# Patient Record
Sex: FEMALE | Employment: OTHER | URBAN - METROPOLITAN AREA
[De-identification: names, ages, dates, MRNs, and addresses within clinical notes are randomized per-mention and may not be internally consistent; named-entity substitution may affect disease eponyms.]

---

## 2020-06-10 ENCOUNTER — TELEPHONE ENCOUNTER (OUTPATIENT)
Dept: URBAN - METROPOLITAN AREA CLINIC 9 | Facility: CLINIC | Age: 70
End: 2020-06-10

## 2022-07-30 ENCOUNTER — TELEPHONE ENCOUNTER (OUTPATIENT)
Age: 72
End: 2022-07-30

## 2022-07-31 ENCOUNTER — TELEPHONE ENCOUNTER (OUTPATIENT)
Age: 72
End: 2022-07-31

## 2023-05-11 PROBLEM — K21.9 GERD (GASTROESOPHAGEAL REFLUX DISEASE): Status: ACTIVE | Noted: 2023-05-11

## 2023-05-11 PROBLEM — F01.50 VASCULAR DEMENTIA WITHOUT BEHAVIORAL DISTURBANCE (MULTI): Status: ACTIVE | Noted: 2023-05-11

## 2023-05-11 PROBLEM — F32.9 MDD (MAJOR DEPRESSIVE DISORDER): Status: ACTIVE | Noted: 2023-05-11

## 2023-05-11 PROBLEM — I82.509 CHRONIC VENOUS EMBOLISM AND THROMBOSIS OF DEEP VESSELS OF LOWER EXTREMITY (MULTI): Status: ACTIVE | Noted: 2023-05-11

## 2023-05-11 PROBLEM — I10 BENIGN ESSENTIAL HTN: Status: ACTIVE | Noted: 2023-05-11

## 2023-05-11 RX ORDER — AMLODIPINE BESYLATE 5 MG/1
5 TABLET ORAL DAILY
COMMUNITY

## 2023-05-11 RX ORDER — OLANZAPINE 10 MG/1
10 TABLET ORAL NIGHTLY
COMMUNITY

## 2023-05-11 RX ORDER — DONEPEZIL HYDROCHLORIDE 5 MG/1
5 TABLET, FILM COATED ORAL NIGHTLY
COMMUNITY

## 2023-05-11 RX ORDER — FLUOXETINE HYDROCHLORIDE 20 MG/1
20 CAPSULE ORAL EVERY MORNING
COMMUNITY

## 2023-05-11 RX ORDER — LOPERAMIDE HYDROCHLORIDE 2 MG/1
2 CAPSULE ORAL DAILY PRN
COMMUNITY

## 2023-05-11 RX ORDER — LOSARTAN POTASSIUM 100 MG/1
100 TABLET ORAL DAILY
COMMUNITY

## 2023-05-11 RX ORDER — ASPIRIN 81 MG/1
81 TABLET ORAL DAILY
COMMUNITY

## 2023-05-11 RX ORDER — OMEPRAZOLE 40 MG/1
40 CAPSULE, DELAYED RELEASE ORAL
COMMUNITY

## 2024-07-28 ENCOUNTER — APPOINTMENT (OUTPATIENT)
Dept: RADIOLOGY | Facility: HOSPITAL | Age: 74
End: 2024-07-28
Payer: MEDICAID

## 2024-07-28 ENCOUNTER — HOSPITAL ENCOUNTER (EMERGENCY)
Facility: HOSPITAL | Age: 74
Discharge: HOME | End: 2024-07-28
Attending: EMERGENCY MEDICINE
Payer: MEDICAID

## 2024-07-28 VITALS
SYSTOLIC BLOOD PRESSURE: 128 MMHG | TEMPERATURE: 98.2 F | HEIGHT: 66 IN | HEART RATE: 49 BPM | DIASTOLIC BLOOD PRESSURE: 72 MMHG | OXYGEN SATURATION: 98 % | BODY MASS INDEX: 30.9 KG/M2 | WEIGHT: 192.24 LBS | RESPIRATION RATE: 17 BRPM

## 2024-07-28 DIAGNOSIS — S01.81XA FACIAL LACERATION, INITIAL ENCOUNTER: ICD-10-CM

## 2024-07-28 DIAGNOSIS — S02.85XA CLOSED FRACTURE OF ORBITAL WALL, INITIAL ENCOUNTER (MULTI): Primary | ICD-10-CM
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
## 2024-07-28 LAB
ABO GROUP (TYPE) IN BLOOD: NORMAL
ANTIBODY SCREEN: NORMAL
RH FACTOR (ANTIGEN D): NORMAL

## 2024-07-28 PROCEDURE — 36415 COLL VENOUS BLD VENIPUNCTURE: CPT | Performed by: EMERGENCY MEDICINE

## 2024-07-28 PROCEDURE — 70486 CT MAXILLOFACIAL W/O DYE: CPT

## 2024-07-28 PROCEDURE — 86901 BLOOD TYPING SEROLOGIC RH(D): CPT | Performed by: EMERGENCY MEDICINE

## 2024-07-28 PROCEDURE — 72125 CT NECK SPINE W/O DYE: CPT

## 2024-07-28 PROCEDURE — G0390 TRAUMA RESPONS W/HOSP CRITI: HCPCS

## 2024-07-28 PROCEDURE — 70450 CT HEAD/BRAIN W/O DYE: CPT

## 2024-07-28 PROCEDURE — 76377 3D RENDER W/INTRP POSTPROCES: CPT

## 2024-07-28 PROCEDURE — 99285 EMERGENCY DEPT VISIT HI MDM: CPT | Mod: 25

## 2024-07-28 ASSESSMENT — PAIN DESCRIPTION - ORIENTATION: ORIENTATION: RIGHT

## 2024-07-28 ASSESSMENT — PAIN SCALES - GENERAL: PAINLEVEL_OUTOF10: 5 - MODERATE PAIN

## 2024-07-28 ASSESSMENT — PAIN - FUNCTIONAL ASSESSMENT: PAIN_FUNCTIONAL_ASSESSMENT: 0-10

## 2024-07-28 ASSESSMENT — COLUMBIA-SUICIDE SEVERITY RATING SCALE - C-SSRS
2. HAVE YOU ACTUALLY HAD ANY THOUGHTS OF KILLING YOURSELF?: NO
1. IN THE PAST MONTH, HAVE YOU WISHED YOU WERE DEAD OR WISHED YOU COULD GO TO SLEEP AND NOT WAKE UP?: NO
6. HAVE YOU EVER DONE ANYTHING, STARTED TO DO ANYTHING, OR PREPARED TO DO ANYTHING TO END YOUR LIFE?: NO

## 2024-07-28 ASSESSMENT — LIFESTYLE VARIABLES
EVER HAD A DRINK FIRST THING IN THE MORNING TO STEADY YOUR NERVES TO GET RID OF A HANGOVER: NO
EVER FELT BAD OR GUILTY ABOUT YOUR DRINKING: NO
TOTAL SCORE: 0
HAVE PEOPLE ANNOYED YOU BY CRITICIZING YOUR DRINKING: NO
HAVE YOU EVER FELT YOU SHOULD CUT DOWN ON YOUR DRINKING: NO

## 2024-07-28 ASSESSMENT — PAIN DESCRIPTION - LOCATION: LOCATION: EYE

## 2024-07-28 ASSESSMENT — PAIN DESCRIPTION - PAIN TYPE: TYPE: ACUTE PAIN

## 2024-07-28 NOTE — ED PROVIDER NOTES
HPI   Chief Complaint   Patient presents with    Fall       73-year-old female here from residence in [place] for chief complaint of a fall.  Patient states she tripped and fell in the bathroom injuring her face.  She remembers the event no loss of consciousness.  She has a laceration above her right eye.  She is currently in no pain.              Patient History   No past medical history on file.  No past surgical history on file.  No family history on file.  Social History     Tobacco Use    Smoking status: Not on file    Smokeless tobacco: Not on file   Substance Use Topics    Alcohol use: Not on file    Drug use: Not on file       Physical Exam   ED Triage Vitals [07/28/24 0828]   Temperature Heart Rate Respirations BP   36.8 °C (98.2 °F) (!) 44 17 (!) 192/75      Pulse Ox Temp Source Heart Rate Source Patient Position   94 % Temporal Monitor Lying      BP Location FiO2 (%)     Right arm --       Physical Exam  Vitals and nursing note reviewed.   Constitutional:       Appearance: Normal appearance.   HENT:      Head: Normocephalic.      Nose: Nose normal.      Mouth/Throat:      Mouth: Mucous membranes are moist.   Eyes:      Extraocular Movements: Extraocular movements intact.      Pupils: Pupils are equal, round, and reactive to light.      Comments: There is contusion present surrounding the right eye.   Cardiovascular:      Rate and Rhythm: Normal rate and regular rhythm.   Pulmonary:      Effort: Pulmonary effort is normal.      Breath sounds: Normal breath sounds.   Abdominal:      General: Abdomen is flat.      Palpations: Abdomen is soft.   Musculoskeletal:         General: Normal range of motion.      Cervical back: Normal range of motion.   Skin:     General: Skin is warm and dry.      Capillary Refill: Capillary refill takes less than 2 seconds.      Comments: There is a 2 cm laceration just above the right eyebrow   Neurological:      General: No focal deficit present.      Mental Status: She is  alert.   Psychiatric:         Mood and Affect: Mood normal.           ED Course & MDM   Diagnoses as of 07/28/24 1302   Closed fracture of orbital wall, initial encounter (Multi)   Facial laceration, initial encounter   Fall, initial encounter                       Frank[place] Coma Scale Score: 15                        Medical Decision Making      Medical Decision Making: CT of the head facial bones and C-spine were done.  She has a right orbital medial wall fracture up to 3 to 4 mm depression small amount of blood on the superficial aspect of the right medius muscle there is no muscle entrapment.  At this time the wound was cleansed and Dermabond it is a superficial laceration.  She can be safely followed up with Dr. Juliocesar Rinaldi.  [unfilled]     Differential Diagnoses Considered: Skull fracture facial bone fracture intracranial hemorrhage    Chronic Medical Conditions Significantly Affecting Care: On a blood thinner    External Records Reviewed: I reviewed recent and relevant outside records including: Previous lab work    Social Determinants of Health Significantly Affecting Care: See HPI    Diagnostic testing considered: Chest x-ray    Stephy Resendiz D.O.  Emergency Medicine          Procedure  Procedures     Stephy Resendiz,   07/28/24 1302

## 2024-09-04 ENCOUNTER — LAB REQUISITION (OUTPATIENT)
Dept: LAB | Facility: HOSPITAL | Age: 74
End: 2024-09-04
Payer: COMMERCIAL

## 2024-09-04 LAB
25(OH)D3 SERPL-MCNC: 27 NG/ML (ref 30–100)
ALBUMIN SERPL BCP-MCNC: 3.4 G/DL (ref 3.4–5)
ALP SERPL-CCNC: 70 U/L (ref 33–136)
ALT SERPL W P-5'-P-CCNC: 5 U/L (ref 7–45)
ANION GAP SERPL CALC-SCNC: 11 MMOL/L (ref 10–20)
AST SERPL W P-5'-P-CCNC: 13 U/L (ref 9–39)
BILIRUB SERPL-MCNC: 0.4 MG/DL (ref 0–1.2)
BUN SERPL-MCNC: 15 MG/DL (ref 6–23)
CALCIUM SERPL-MCNC: 8.4 MG/DL (ref 8.6–10.3)
CHLORIDE SERPL-SCNC: 102 MMOL/L (ref 98–107)
CO2 SERPL-SCNC: 31 MMOL/L (ref 21–32)
CREAT SERPL-MCNC: 0.86 MG/DL (ref 0.5–1.05)
EGFRCR SERPLBLD CKD-EPI 2021: 72 ML/MIN/1.73M*2
ERYTHROCYTE [DISTWIDTH] IN BLOOD BY AUTOMATED COUNT: 14.5 % (ref 11.5–14.5)
EST. AVERAGE GLUCOSE BLD GHB EST-MCNC: 111 MG/DL
FOLATE SERPL-MCNC: 12.3 NG/ML
GLUCOSE SERPL-MCNC: 86 MG/DL (ref 74–99)
HBA1C MFR BLD: 5.5 %
HCT VFR BLD AUTO: 36.6 % (ref 36–46)
HGB BLD-MCNC: 11.4 G/DL (ref 12–16)
MCH RBC QN AUTO: 28.9 PG (ref 26–34)
MCHC RBC AUTO-ENTMCNC: 31.1 G/DL (ref 32–36)
MCV RBC AUTO: 93 FL (ref 80–100)
NRBC BLD-RTO: 0 /100 WBCS (ref 0–0)
PLATELET # BLD AUTO: 181 X10*3/UL (ref 150–450)
POTASSIUM SERPL-SCNC: 4.2 MMOL/L (ref 3.5–5.3)
PROT SERPL-MCNC: 5.6 G/DL (ref 6.4–8.2)
RBC # BLD AUTO: 3.94 X10*6/UL (ref 4–5.2)
SODIUM SERPL-SCNC: 140 MMOL/L (ref 136–145)
TSH SERPL-ACNC: 2.51 MIU/L (ref 0.44–3.98)
VIT B12 SERPL-MCNC: 285 PG/ML (ref 211–911)
WBC # BLD AUTO: 6.1 X10*3/UL (ref 4.4–11.3)

## 2024-09-04 PROCEDURE — 80053 COMPREHEN METABOLIC PANEL: CPT | Mod: OUT

## 2024-09-04 PROCEDURE — 82607 VITAMIN B-12: CPT | Mod: OUT,GEALAB

## 2024-09-04 PROCEDURE — 84443 ASSAY THYROID STIM HORMONE: CPT | Mod: OUT

## 2024-09-04 PROCEDURE — 36415 COLL VENOUS BLD VENIPUNCTURE: CPT | Mod: OUT

## 2024-09-04 PROCEDURE — 82746 ASSAY OF FOLIC ACID SERUM: CPT | Mod: OUT,GEALAB

## 2024-09-04 PROCEDURE — 83036 HEMOGLOBIN GLYCOSYLATED A1C: CPT | Mod: OUT,GEALAB

## 2024-09-04 PROCEDURE — 84425 ASSAY OF VITAMIN B-1: CPT | Mod: OUT

## 2024-09-04 PROCEDURE — 84075 ASSAY ALKALINE PHOSPHATASE: CPT | Mod: OUT

## 2024-09-04 PROCEDURE — 82306 VITAMIN D 25 HYDROXY: CPT | Mod: OUT,GEALAB

## 2024-09-04 PROCEDURE — 36415 COLL VENOUS BLD VENIPUNCTURE: CPT

## 2024-09-04 PROCEDURE — 85027 COMPLETE CBC AUTOMATED: CPT | Mod: OUT

## 2024-09-07 LAB — VIT B1 PYROPHOSHATE BLD-SCNC: 111 NMOL/L (ref 70–180)

## 2024-10-02 DIAGNOSIS — F01.50 VASCULAR DEMENTIA WITHOUT BEHAVIORAL DISTURBANCE (MULTI): ICD-10-CM

## 2024-10-02 DIAGNOSIS — I10 BENIGN ESSENTIAL HTN: Primary | ICD-10-CM

## 2024-10-02 DIAGNOSIS — K21.9 GASTROESOPHAGEAL REFLUX DISEASE WITHOUT ESOPHAGITIS: ICD-10-CM

## 2024-10-02 DIAGNOSIS — I82.509 CHRONIC VENOUS EMBOLISM AND THROMBOSIS OF DEEP VESSELS OF LOWER EXTREMITY, UNSPECIFIED LATERALITY (MULTI): Primary | ICD-10-CM

## 2024-10-02 DIAGNOSIS — F32.5 MAJOR DEPRESSIVE DISORDER WITH SINGLE EPISODE, IN FULL REMISSION (CMS-HCC): ICD-10-CM

## 2024-10-02 RX ORDER — FUROSEMIDE 20 MG/1
TABLET ORAL
Qty: 30 TABLET | Refills: 11 | Status: SHIPPED | OUTPATIENT
Start: 2024-10-02 | End: 2025-10-02

## 2024-10-02 RX ORDER — OLANZAPINE 10 MG/1
TABLET ORAL
Qty: 30 TABLET | Refills: 11 | Status: SHIPPED | OUTPATIENT
Start: 2024-10-02 | End: 2025-10-02

## 2024-10-02 RX ORDER — OMEPRAZOLE 40 MG/1
40 CAPSULE, DELAYED RELEASE ORAL DAILY
Qty: 30 CAPSULE | Refills: 11 | Status: SHIPPED | OUTPATIENT
Start: 2024-10-02 | End: 2025-10-02

## 2024-10-02 RX ORDER — FLUOXETINE HYDROCHLORIDE 20 MG/1
CAPSULE ORAL
Qty: 30 CAPSULE | Refills: 11 | Status: SHIPPED | OUTPATIENT
Start: 2024-10-02 | End: 2025-10-02

## 2024-10-02 RX ORDER — METOPROLOL SUCCINATE 50 MG/1
TABLET, EXTENDED RELEASE ORAL
Qty: 30 TABLET | Refills: 11 | Status: SHIPPED | OUTPATIENT
Start: 2024-10-02 | End: 2025-10-02

## 2024-10-02 RX ORDER — LOSARTAN POTASSIUM 100 MG/1
TABLET ORAL
Qty: 30 TABLET | Refills: 11 | Status: SHIPPED | OUTPATIENT
Start: 2024-10-02 | End: 2025-10-02

## 2024-10-02 RX ORDER — ASPIRIN 81 MG/1
TABLET ORAL
Qty: 30 TABLET | Refills: 11 | Status: SHIPPED | OUTPATIENT
Start: 2024-10-02 | End: 2025-10-02

## 2024-10-02 RX ORDER — APIXABAN 2.5 MG/1
2.5 TABLET, FILM COATED ORAL 2 TIMES DAILY
Qty: 60 TABLET | Refills: 11 | Status: SHIPPED | OUTPATIENT
Start: 2024-10-02

## 2025-07-11 ENCOUNTER — APPOINTMENT (OUTPATIENT)
Dept: CARDIOLOGY | Facility: HOSPITAL | Age: 75
DRG: 242 | End: 2025-07-11
Payer: COMMERCIAL

## 2025-07-11 ENCOUNTER — APPOINTMENT (OUTPATIENT)
Dept: RADIOLOGY | Facility: HOSPITAL | Age: 75
DRG: 242 | End: 2025-07-11
Payer: COMMERCIAL

## 2025-07-11 ENCOUNTER — HOSPITAL ENCOUNTER (INPATIENT)
Facility: HOSPITAL | Age: 75
DRG: 242 | End: 2025-07-11
Attending: STUDENT IN AN ORGANIZED HEALTH CARE EDUCATION/TRAINING PROGRAM | Admitting: STUDENT IN AN ORGANIZED HEALTH CARE EDUCATION/TRAINING PROGRAM
Payer: COMMERCIAL

## 2025-07-11 DIAGNOSIS — G47.34 NOCTURNAL HYPOXIA: ICD-10-CM

## 2025-07-11 DIAGNOSIS — I48.91 ATRIAL FIB/FLUTTER, TRANSIENT (MULTI): ICD-10-CM
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
## 2025-07-11 DIAGNOSIS — E87.70 HYPERVOLEMIA, UNSPECIFIED HYPERVOLEMIA TYPE: ICD-10-CM

## 2025-07-11 DIAGNOSIS — J96.01 ACUTE RESPIRATORY FAILURE WITH HYPOXIA: ICD-10-CM

## 2025-07-11 DIAGNOSIS — I50.21 ACUTE SYSTOLIC HEART FAILURE: ICD-10-CM

## 2025-07-11 DIAGNOSIS — J90 PLEURAL EFFUSION: ICD-10-CM

## 2025-07-11 DIAGNOSIS — J90 PLEURAL EFFUSION ON RIGHT: Primary | ICD-10-CM

## 2025-07-11 DIAGNOSIS — I10 BENIGN ESSENTIAL HTN: ICD-10-CM

## 2025-07-11 DIAGNOSIS — Z95.0 PACEMAKER: ICD-10-CM

## 2025-07-11 DIAGNOSIS — I49.5 SICK SINUS SYNDROME (MULTI): ICD-10-CM

## 2025-07-11 DIAGNOSIS — R00.1 BRADYCARDIA: ICD-10-CM

## 2025-07-11 DIAGNOSIS — I48.91 ATRIAL FIBRILLATION WITH RVR (MULTI): ICD-10-CM

## 2025-07-11 DIAGNOSIS — I48.92 ATRIAL FIB/FLUTTER, TRANSIENT (MULTI): ICD-10-CM

## 2025-07-11 DIAGNOSIS — I50.30 HEART FAILURE WITH PRESERVED EJECTION FRACTION, UNSPECIFIED HF CHRONICITY: ICD-10-CM

## 2025-07-11 DIAGNOSIS — R79.89 TROPONIN LEVEL ELEVATED: ICD-10-CM

## 2025-07-11 PROBLEM — I82.4Y2 DEEP VEIN THROMBOSIS (DVT) OF PROXIMAL VEIN OF LEFT LOWER EXTREMITY: Status: RESOLVED | Noted: 2020-09-18 | Resolved: 2025-07-11

## 2025-07-11 LAB
ABO GROUP (TYPE) IN BLOOD: NORMAL
ALBUMIN SERPL BCP-MCNC: 3.8 G/DL (ref 3.4–5)
ALP SERPL-CCNC: 60 U/L (ref 33–136)
ALT SERPL W P-5'-P-CCNC: 7 U/L (ref 7–45)
ANION GAP SERPL CALC-SCNC: 13 MMOL/L (ref 10–20)
ANTIBODY SCREEN: NORMAL
APPEARANCE UR: CLEAR
AST SERPL W P-5'-P-CCNC: 19 U/L (ref 9–39)
BASOPHILS # BLD AUTO: 0.03 X10*3/UL (ref 0–0.1)
BASOPHILS NFR BLD AUTO: 0.3 %
BILIRUB SERPL-MCNC: 0.9 MG/DL (ref 0–1.2)
BILIRUB UR STRIP.AUTO-MCNC: NEGATIVE MG/DL
BNP SERPL-MCNC: 1206 PG/ML (ref 0–99)
BUN SERPL-MCNC: 19 MG/DL (ref 6–23)
CALCIUM SERPL-MCNC: 8.8 MG/DL (ref 8.6–10.3)
CARDIAC TROPONIN I PNL SERPL HS: 16 NG/L (ref 0–13)
CARDIAC TROPONIN I PNL SERPL HS: 18 NG/L (ref 0–13)
CARDIAC TROPONIN I PNL SERPL HS: 19 NG/L (ref 0–13)
CHLORIDE SERPL-SCNC: 98 MMOL/L (ref 98–107)
CO2 SERPL-SCNC: 33 MMOL/L (ref 21–32)
COLOR UR: ABNORMAL
CREAT SERPL-MCNC: 0.99 MG/DL (ref 0.5–1.05)
EGFRCR SERPLBLD CKD-EPI 2021: 60 ML/MIN/1.73M*2
EOSINOPHIL # BLD AUTO: 0.01 X10*3/UL (ref 0–0.4)
EOSINOPHIL NFR BLD AUTO: 0.1 %
ERYTHROCYTE [DISTWIDTH] IN BLOOD BY AUTOMATED COUNT: 15.4 % (ref 11.5–14.5)
GLUCOSE SERPL-MCNC: 127 MG/DL (ref 74–99)
GLUCOSE UR STRIP.AUTO-MCNC: NORMAL MG/DL
HCT VFR BLD AUTO: 38.6 % (ref 36–46)
HGB BLD-MCNC: 11.8 G/DL (ref 12–16)
IMM GRANULOCYTES # BLD AUTO: 0.04 X10*3/UL (ref 0–0.5)
IMM GRANULOCYTES NFR BLD AUTO: 0.4 % (ref 0–0.9)
INR PPP: 2.1 (ref 0.9–1.1)
KETONES UR STRIP.AUTO-MCNC: NEGATIVE MG/DL
LEUKOCYTE ESTERASE UR QL STRIP.AUTO: NEGATIVE
LYMPHOCYTES # BLD AUTO: 0.6 X10*3/UL (ref 0.8–3)
LYMPHOCYTES NFR BLD AUTO: 5.9 %
MAGNESIUM SERPL-MCNC: 2.04 MG/DL (ref 1.6–2.4)
MCH RBC QN AUTO: 29 PG (ref 26–34)
MCHC RBC AUTO-ENTMCNC: 30.6 G/DL (ref 32–36)
MCV RBC AUTO: 95 FL (ref 80–100)
MONOCYTES # BLD AUTO: 0.72 X10*3/UL (ref 0.05–0.8)
MONOCYTES NFR BLD AUTO: 7.1 %
NEUTROPHILS # BLD AUTO: 8.79 X10*3/UL (ref 1.6–5.5)
NEUTROPHILS NFR BLD AUTO: 86.2 %
NITRITE UR QL STRIP.AUTO: NEGATIVE
NRBC BLD-RTO: 0 /100 WBCS (ref 0–0)
PH UR STRIP.AUTO: 5 [PH]
PLATELET # BLD AUTO: 212 X10*3/UL (ref 150–450)
POTASSIUM SERPL-SCNC: 4.3 MMOL/L (ref 3.5–5.3)
PROT SERPL-MCNC: 6.8 G/DL (ref 6.4–8.2)
PROT UR STRIP.AUTO-MCNC: NEGATIVE MG/DL
PROTHROMBIN TIME: 23.3 SECONDS (ref 9.8–12.4)
RBC # BLD AUTO: 4.07 X10*6/UL (ref 4–5.2)
RBC # UR STRIP.AUTO: ABNORMAL MG/DL
RBC #/AREA URNS AUTO: NORMAL /HPF
RH FACTOR (ANTIGEN D): NORMAL
SODIUM SERPL-SCNC: 140 MMOL/L (ref 136–145)
SP GR UR STRIP.AUTO: 1.05
SQUAMOUS #/AREA URNS AUTO: NORMAL /HPF
UROBILINOGEN UR STRIP.AUTO-MCNC: NORMAL MG/DL
WBC # BLD AUTO: 10.2 X10*3/UL (ref 4.4–11.3)
WBC #/AREA URNS AUTO: NORMAL /HPF

## 2025-07-11 PROCEDURE — 85610 PROTHROMBIN TIME: CPT | Performed by: STUDENT IN AN ORGANIZED HEALTH CARE EDUCATION/TRAINING PROGRAM

## 2025-07-11 PROCEDURE — 70450 CT HEAD/BRAIN W/O DYE: CPT

## 2025-07-11 PROCEDURE — 94760 N-INVAS EAR/PLS OXIMETRY 1: CPT

## 2025-07-11 PROCEDURE — 80053 COMPREHEN METABOLIC PANEL: CPT | Performed by: STUDENT IN AN ORGANIZED HEALTH CARE EDUCATION/TRAINING PROGRAM

## 2025-07-11 PROCEDURE — 72125 CT NECK SPINE W/O DYE: CPT | Performed by: RADIOLOGY

## 2025-07-11 PROCEDURE — 93005 ELECTROCARDIOGRAM TRACING: CPT

## 2025-07-11 PROCEDURE — 84484 ASSAY OF TROPONIN QUANT: CPT

## 2025-07-11 PROCEDURE — 71260 CT THORAX DX C+: CPT | Mod: FOREIGN READ | Performed by: RADIOLOGY

## 2025-07-11 PROCEDURE — 2550000001 HC RX 255 CONTRASTS: Performed by: STUDENT IN AN ORGANIZED HEALTH CARE EDUCATION/TRAINING PROGRAM

## 2025-07-11 PROCEDURE — 72131 CT LUMBAR SPINE W/O DYE: CPT | Mod: FOREIGN READ | Performed by: RADIOLOGY

## 2025-07-11 PROCEDURE — 2500000005 HC RX 250 GENERAL PHARMACY W/O HCPCS

## 2025-07-11 PROCEDURE — 36415 COLL VENOUS BLD VENIPUNCTURE: CPT

## 2025-07-11 PROCEDURE — 2500000001 HC RX 250 WO HCPCS SELF ADMINISTERED DRUGS (ALT 637 FOR MEDICARE OP)

## 2025-07-11 PROCEDURE — 86901 BLOOD TYPING SEROLOGIC RH(D): CPT | Performed by: STUDENT IN AN ORGANIZED HEALTH CARE EDUCATION/TRAINING PROGRAM

## 2025-07-11 PROCEDURE — 96374 THER/PROPH/DIAG INJ IV PUSH: CPT

## 2025-07-11 PROCEDURE — 84484 ASSAY OF TROPONIN QUANT: CPT | Performed by: STUDENT IN AN ORGANIZED HEALTH CARE EDUCATION/TRAINING PROGRAM

## 2025-07-11 PROCEDURE — 99223 1ST HOSP IP/OBS HIGH 75: CPT

## 2025-07-11 PROCEDURE — 83735 ASSAY OF MAGNESIUM: CPT | Performed by: STUDENT IN AN ORGANIZED HEALTH CARE EDUCATION/TRAINING PROGRAM

## 2025-07-11 PROCEDURE — 83880 ASSAY OF NATRIURETIC PEPTIDE: CPT | Performed by: STUDENT IN AN ORGANIZED HEALTH CARE EDUCATION/TRAINING PROGRAM

## 2025-07-11 PROCEDURE — 99285 EMERGENCY DEPT VISIT HI MDM: CPT | Mod: 25 | Performed by: STUDENT IN AN ORGANIZED HEALTH CARE EDUCATION/TRAINING PROGRAM

## 2025-07-11 PROCEDURE — 74177 CT ABD & PELVIS W/CONTRAST: CPT

## 2025-07-11 PROCEDURE — 72128 CT CHEST SPINE W/O DYE: CPT | Mod: FOREIGN READ | Performed by: RADIOLOGY

## 2025-07-11 PROCEDURE — 72125 CT NECK SPINE W/O DYE: CPT

## 2025-07-11 PROCEDURE — 81001 URINALYSIS AUTO W/SCOPE: CPT | Performed by: STUDENT IN AN ORGANIZED HEALTH CARE EDUCATION/TRAINING PROGRAM

## 2025-07-11 PROCEDURE — G0390 TRAUMA RESPONS W/HOSP CRITI: HCPCS

## 2025-07-11 PROCEDURE — 70450 CT HEAD/BRAIN W/O DYE: CPT | Performed by: RADIOLOGY

## 2025-07-11 PROCEDURE — 74177 CT ABD & PELVIS W/CONTRAST: CPT | Mod: FOREIGN READ | Performed by: RADIOLOGY

## 2025-07-11 PROCEDURE — 2500000002 HC RX 250 W HCPCS SELF ADMINISTERED DRUGS (ALT 637 FOR MEDICARE OP, ALT 636 FOR OP/ED)

## 2025-07-11 PROCEDURE — 85025 COMPLETE CBC W/AUTO DIFF WBC: CPT | Performed by: STUDENT IN AN ORGANIZED HEALTH CARE EDUCATION/TRAINING PROGRAM

## 2025-07-11 PROCEDURE — 2500000004 HC RX 250 GENERAL PHARMACY W/ HCPCS (ALT 636 FOR OP/ED)

## 2025-07-11 PROCEDURE — 2500000004 HC RX 250 GENERAL PHARMACY W/ HCPCS (ALT 636 FOR OP/ED): Performed by: STUDENT IN AN ORGANIZED HEALTH CARE EDUCATION/TRAINING PROGRAM

## 2025-07-11 PROCEDURE — 36415 COLL VENOUS BLD VENIPUNCTURE: CPT | Performed by: STUDENT IN AN ORGANIZED HEALTH CARE EDUCATION/TRAINING PROGRAM

## 2025-07-11 PROCEDURE — 94640 AIRWAY INHALATION TREATMENT: CPT

## 2025-07-11 PROCEDURE — 9420000001 HC RT PATIENT EDUCATION 5 MIN

## 2025-07-11 PROCEDURE — 1200000002 HC GENERAL ROOM WITH TELEMETRY DAILY

## 2025-07-11 RX ORDER — FLUOXETINE 10 MG/1
10 CAPSULE ORAL EVERY MORNING
COMMUNITY

## 2025-07-11 RX ORDER — FUROSEMIDE 10 MG/ML
40 INJECTION INTRAMUSCULAR; INTRAVENOUS DAILY
Status: DISCONTINUED | OUTPATIENT
Start: 2025-07-12 | End: 2025-07-14

## 2025-07-11 RX ORDER — ADHESIVE BANDAGE
30 BANDAGE TOPICAL DAILY PRN
COMMUNITY

## 2025-07-11 RX ORDER — LEVALBUTEROL INHALATION SOLUTION 1.25 MG/3ML
1.25 SOLUTION RESPIRATORY (INHALATION) EVERY 4 HOURS PRN
Status: DISCONTINUED | OUTPATIENT
Start: 2025-07-11 | End: 2025-07-23 | Stop reason: HOSPADM

## 2025-07-11 RX ORDER — DONEPEZIL HYDROCHLORIDE 5 MG/1
5 TABLET, FILM COATED ORAL NIGHTLY
Status: DISCONTINUED | OUTPATIENT
Start: 2025-07-11 | End: 2025-07-15

## 2025-07-11 RX ORDER — ACETAMINOPHEN 325 MG/1
650 TABLET ORAL EVERY 4 HOURS PRN
COMMUNITY

## 2025-07-11 RX ORDER — LORATADINE 10 MG/1
10 TABLET ORAL DAILY PRN
COMMUNITY

## 2025-07-11 RX ORDER — FUROSEMIDE 10 MG/ML
40 INJECTION INTRAMUSCULAR; INTRAVENOUS ONCE
Status: COMPLETED | OUTPATIENT
Start: 2025-07-11 | End: 2025-07-11

## 2025-07-11 RX ORDER — HYDROCORTISONE 1 %
1 CREAM (GRAM) TOPICAL 4 TIMES DAILY PRN
COMMUNITY

## 2025-07-11 RX ORDER — ALUMINUM HYDROXIDE, MAGNESIUM HYDROXIDE, AND SIMETHICONE 1200; 120; 1200 MG/30ML; MG/30ML; MG/30ML
15 SUSPENSION ORAL 2 TIMES DAILY PRN
COMMUNITY

## 2025-07-11 RX ORDER — DIPHENHYDRAMINE HCL 25 MG
25 CAPSULE ORAL EVERY 6 HOURS PRN
COMMUNITY

## 2025-07-11 RX ORDER — ENOXAPARIN SODIUM 100 MG/ML
40 INJECTION SUBCUTANEOUS EVERY 24 HOURS
Status: DISCONTINUED | OUTPATIENT
Start: 2025-07-11 | End: 2025-07-15

## 2025-07-11 RX ORDER — ENEMA 19; 7 G/133ML; G/133ML
1 ENEMA RECTAL DAILY PRN
COMMUNITY
End: 2025-07-23 | Stop reason: HOSPADM

## 2025-07-11 RX ORDER — LOSARTAN POTASSIUM 50 MG/1
100 TABLET ORAL DAILY
Status: DISCONTINUED | OUTPATIENT
Start: 2025-07-11 | End: 2025-07-18

## 2025-07-11 RX ORDER — PANTOPRAZOLE SODIUM 40 MG/1
40 TABLET, DELAYED RELEASE ORAL
Status: DISCONTINUED | OUTPATIENT
Start: 2025-07-12 | End: 2025-07-23 | Stop reason: HOSPADM

## 2025-07-11 RX ORDER — POLYETHYLENE GLYCOL 3350 17 G/17G
17 POWDER, FOR SOLUTION ORAL DAILY
Status: DISCONTINUED | OUTPATIENT
Start: 2025-07-11 | End: 2025-07-23 | Stop reason: HOSPADM

## 2025-07-11 RX ORDER — SENNOSIDES 8.6 MG/1
2 TABLET ORAL 2 TIMES DAILY PRN
COMMUNITY

## 2025-07-11 RX ORDER — OLANZAPINE 5 MG/1
10 TABLET, FILM COATED ORAL NIGHTLY
Status: DISCONTINUED | OUTPATIENT
Start: 2025-07-11 | End: 2025-07-23 | Stop reason: HOSPADM

## 2025-07-11 RX ORDER — IPRATROPIUM BROMIDE AND ALBUTEROL SULFATE 2.5; .5 MG/3ML; MG/3ML
3 SOLUTION RESPIRATORY (INHALATION) EVERY 2 HOUR PRN
Status: DISCONTINUED | OUTPATIENT
Start: 2025-07-11 | End: 2025-07-11

## 2025-07-11 RX ORDER — BENZONATATE 100 MG/1
100 CAPSULE ORAL 3 TIMES DAILY PRN
COMMUNITY
End: 2025-07-23 | Stop reason: HOSPADM

## 2025-07-11 RX ORDER — ASPIRIN 81 MG/1
81 TABLET ORAL DAILY
Status: DISCONTINUED | OUTPATIENT
Start: 2025-07-11 | End: 2025-07-23 | Stop reason: HOSPADM

## 2025-07-11 RX ORDER — BISACODYL 10 MG/1
10 SUPPOSITORY RECTAL DAILY PRN
COMMUNITY

## 2025-07-11 RX ORDER — IBUPROFEN 200 MG
400 TABLET ORAL EVERY 4 HOURS PRN
COMMUNITY
End: 2025-07-23 | Stop reason: HOSPADM

## 2025-07-11 RX ADMIN — LEVALBUTEROL HYDROCHLORIDE 1.25 MG: 1.25 SOLUTION RESPIRATORY (INHALATION) at 21:41

## 2025-07-11 RX ADMIN — OLANZAPINE 10 MG: 5 TABLET, FILM COATED ORAL at 20:43

## 2025-07-11 RX ADMIN — LOSARTAN POTASSIUM 100 MG: 50 TABLET, FILM COATED ORAL at 15:14

## 2025-07-11 RX ADMIN — IOHEXOL 100 ML: 350 INJECTION, SOLUTION INTRAVENOUS at 10:37

## 2025-07-11 RX ADMIN — FLUOXETINE HYDROCHLORIDE 30 MG: 10 CAPSULE ORAL at 15:13

## 2025-07-11 RX ADMIN — Medication 2.5 L/MIN: at 21:45

## 2025-07-11 RX ADMIN — ENOXAPARIN SODIUM 40 MG: 100 INJECTION SUBCUTANEOUS at 20:43

## 2025-07-11 RX ADMIN — FUROSEMIDE 40 MG: 10 INJECTION, SOLUTION INTRAMUSCULAR; INTRAVENOUS at 13:14

## 2025-07-11 RX ADMIN — Medication 3 L/MIN: at 15:18

## 2025-07-11 RX ADMIN — ASPIRIN 81 MG: 81 TABLET, DELAYED RELEASE ORAL at 15:13

## 2025-07-11 SDOH — SOCIAL STABILITY: SOCIAL INSECURITY: HAS ANYONE EVER THREATENED TO HURT YOUR FAMILY OR YOUR PETS?: NO

## 2025-07-11 SDOH — SOCIAL STABILITY: SOCIAL INSECURITY: WITHIN THE LAST YEAR, HAVE YOU BEEN AFRAID OF YOUR PARTNER OR EX-PARTNER?: NO

## 2025-07-11 SDOH — SOCIAL STABILITY: SOCIAL INSECURITY: ABUSE: ADULT

## 2025-07-11 SDOH — SOCIAL STABILITY: SOCIAL INSECURITY: DO YOU FEEL UNSAFE GOING BACK TO THE PLACE WHERE YOU ARE LIVING?: NO

## 2025-07-11 SDOH — ECONOMIC STABILITY: FOOD INSECURITY: WITHIN THE PAST 12 MONTHS, YOU WORRIED THAT YOUR FOOD WOULD RUN OUT BEFORE YOU GOT THE MONEY TO BUY MORE.: NEVER TRUE

## 2025-07-11 SDOH — ECONOMIC STABILITY: TRANSPORTATION INSECURITY: IN THE PAST 12 MONTHS, HAS LACK OF TRANSPORTATION KEPT YOU FROM MEDICAL APPOINTMENTS OR FROM GETTING MEDICATIONS?: NO

## 2025-07-11 SDOH — SOCIAL STABILITY: SOCIAL INSECURITY: WITHIN THE LAST YEAR, HAVE YOU BEEN HUMILIATED OR EMOTIONALLY ABUSED IN OTHER WAYS BY YOUR PARTNER OR EX-PARTNER?: NO

## 2025-07-11 SDOH — ECONOMIC STABILITY: HOUSING INSECURITY: IN THE LAST 12 MONTHS, WAS THERE A TIME WHEN YOU WERE NOT ABLE TO PAY THE MORTGAGE OR RENT ON TIME?: NO

## 2025-07-11 SDOH — ECONOMIC STABILITY: INCOME INSECURITY: IN THE PAST 12 MONTHS HAS THE ELECTRIC, GAS, OIL, OR WATER COMPANY THREATENED TO SHUT OFF SERVICES IN YOUR HOME?: NO

## 2025-07-11 SDOH — ECONOMIC STABILITY: HOUSING INSECURITY: IN THE PAST 12 MONTHS, HOW MANY TIMES HAVE YOU MOVED WHERE YOU WERE LIVING?: 0

## 2025-07-11 SDOH — ECONOMIC STABILITY: FOOD INSECURITY: WITHIN THE PAST 12 MONTHS, THE FOOD YOU BOUGHT JUST DIDN'T LAST AND YOU DIDN'T HAVE MONEY TO GET MORE.: NEVER TRUE

## 2025-07-11 SDOH — SOCIAL STABILITY: SOCIAL INSECURITY
WITHIN THE LAST YEAR, HAVE YOU BEEN RAPED OR FORCED TO HAVE ANY KIND OF SEXUAL ACTIVITY BY YOUR PARTNER OR EX-PARTNER?: NO

## 2025-07-11 SDOH — ECONOMIC STABILITY: HOUSING INSECURITY: AT ANY TIME IN THE PAST 12 MONTHS, WERE YOU HOMELESS OR LIVING IN A SHELTER (INCLUDING NOW)?: NO

## 2025-07-11 SDOH — SOCIAL STABILITY: SOCIAL INSECURITY
WITHIN THE LAST YEAR, HAVE YOU BEEN KICKED, HIT, SLAPPED, OR OTHERWISE PHYSICALLY HURT BY YOUR PARTNER OR EX-PARTNER?: NO

## 2025-07-11 SDOH — SOCIAL STABILITY: SOCIAL INSECURITY: HAVE YOU HAD ANY THOUGHTS OF HARMING ANYONE ELSE?: NO

## 2025-07-11 SDOH — SOCIAL STABILITY: SOCIAL INSECURITY: ARE THERE ANY APPARENT SIGNS OF INJURIES/BEHAVIORS THAT COULD BE RELATED TO ABUSE/NEGLECT?: NO

## 2025-07-11 SDOH — SOCIAL STABILITY: SOCIAL INSECURITY: ARE YOU OR HAVE YOU BEEN THREATENED OR ABUSED PHYSICALLY, EMOTIONALLY, OR SEXUALLY BY ANYONE?: NO

## 2025-07-11 SDOH — ECONOMIC STABILITY: FOOD INSECURITY
HOW HARD IS IT FOR YOU TO PAY FOR THE VERY BASICS LIKE FOOD, HOUSING, MEDICAL CARE, AND HEATING?: PATIENT UNABLE TO ANSWER

## 2025-07-11 SDOH — SOCIAL STABILITY: SOCIAL INSECURITY: WERE YOU ABLE TO COMPLETE ALL THE BEHAVIORAL HEALTH SCREENINGS?: YES

## 2025-07-11 SDOH — SOCIAL STABILITY: SOCIAL INSECURITY: DOES ANYONE TRY TO KEEP YOU FROM HAVING/CONTACTING OTHER FRIENDS OR DOING THINGS OUTSIDE YOUR HOME?: NO

## 2025-07-11 SDOH — SOCIAL STABILITY: SOCIAL INSECURITY: DO YOU FEEL ANYONE HAS EXPLOITED OR TAKEN ADVANTAGE OF YOU FINANCIALLY OR OF YOUR PERSONAL PROPERTY?: NO

## 2025-07-11 SDOH — SOCIAL STABILITY: SOCIAL INSECURITY: HAVE YOU HAD THOUGHTS OF HARMING ANYONE ELSE?: NO

## 2025-07-11 ASSESSMENT — ACTIVITIES OF DAILY LIVING (ADL)
TOILETING: NEEDS ASSISTANCE
BATHING: NEEDS ASSISTANCE
GROOMING: NEEDS ASSISTANCE
PATIENT'S MEMORY ADEQUATE TO SAFELY COMPLETE DAILY ACTIVITIES?: NO
LACK_OF_TRANSPORTATION: NO
HEARING - RIGHT EAR: HEARING AID
ADEQUATE_TO_COMPLETE_ADL: YES
ASSISTIVE_DEVICE: WALKER
WALKS IN HOME: NEEDS ASSISTANCE
LACK_OF_TRANSPORTATION: NO
FEEDING YOURSELF: NEEDS ASSISTANCE
JUDGMENT_ADEQUATE_SAFELY_COMPLETE_DAILY_ACTIVITIES: NO
DRESSING YOURSELF: NEEDS ASSISTANCE
HEARING - LEFT EAR: HEARING AID

## 2025-07-11 ASSESSMENT — PAIN SCALES - GENERAL
PAINLEVEL_OUTOF10: 0 - NO PAIN

## 2025-07-11 ASSESSMENT — LIFESTYLE VARIABLES
SKIP TO QUESTIONS 9-10: 1
AUDIT-C TOTAL SCORE: 0
HOW OFTEN DO YOU HAVE A DRINK CONTAINING ALCOHOL: NEVER
AUDIT-C TOTAL SCORE: 0
HOW MANY STANDARD DRINKS CONTAINING ALCOHOL DO YOU HAVE ON A TYPICAL DAY: PATIENT DOES NOT DRINK
HOW OFTEN DO YOU HAVE 6 OR MORE DRINKS ON ONE OCCASION: NEVER

## 2025-07-11 ASSESSMENT — COGNITIVE AND FUNCTIONAL STATUS - GENERAL
MOVING TO AND FROM BED TO CHAIR: A LOT
MOVING FROM LYING ON BACK TO SITTING ON SIDE OF FLAT BED WITH BEDRAILS: A LITTLE
DRESSING REGULAR UPPER BODY CLOTHING: A LITTLE
TURNING FROM BACK TO SIDE WHILE IN FLAT BAD: A LITTLE
WALKING IN HOSPITAL ROOM: A LOT
MOBILITY SCORE: 14
TOILETING: A LITTLE
STANDING UP FROM CHAIR USING ARMS: A LOT
PERSONAL GROOMING: A LITTLE
DRESSING REGULAR LOWER BODY CLOTHING: A LOT
DAILY ACTIVITIY SCORE: 18
PATIENT BASELINE BEDBOUND: NO
HELP NEEDED FOR BATHING: A LITTLE
CLIMB 3 TO 5 STEPS WITH RAILING: A LOT

## 2025-07-11 ASSESSMENT — PATIENT HEALTH QUESTIONNAIRE - PHQ9
SUM OF ALL RESPONSES TO PHQ9 QUESTIONS 1 & 2: 0
1. LITTLE INTEREST OR PLEASURE IN DOING THINGS: NOT AT ALL
2. FEELING DOWN, DEPRESSED OR HOPELESS: NOT AT ALL

## 2025-07-11 ASSESSMENT — PAIN - FUNCTIONAL ASSESSMENT
PAIN_FUNCTIONAL_ASSESSMENT: 0-10

## 2025-07-11 NOTE — H&P
"History Of Present Illness  Stella Agarwal is a 74 y.o. female with a PMH of DVT on life-long eliquis, vascular dementia, multiple falls, HTN, and GERD who presented to [facility] on 7/11/2025 for syncope. Of note there were no witnesses of event and patient is unreliable historian. Patient states she was walking in her room at nursing home with walker when she slipped. Her walker hit her in the face and she fell backwards, hitting the back of her head on the floor. She then lost consciousness for what she estimates to be a few minutes. When she awoke, she states she had no confusion. She endorses pain in her left leg which she hit during her fall, but denies any pain in her head, neck, or anywhere else. She denies any chest pain, abdominal pain, confusion, weakness, numbness, constipation, or diarrhea. She also denies any dizziness or precipitating symptoms before her fall.     Collateral obtained via daughter Mini who is medical power of :   She has seemed short of breath over the past 3 days. She notes that she follows with Dr. Munoz cardiologist at [facility] for a \"leaky valve\" although the last time they saw him he stated this was not causing any problems. She denies any history of CHF. She notes she has been hospitalized before for swelling in her leg 2/2 DVT but denies any admissions for heart issues previously. She also notes that Stella has suffered multiple falls over the past few months. She states all of them were mechanical in nature. She denies any history of heart arrhythmia.     ED Course:  Vitals: T 98.2, /97, HR 46, RR 18, SpO2 84% on RA (resolved with 2 L NC)  Labs: CMP and CBC unremarkable, BNP 1206, trop 16, UA unremarkable  Imaging: EKG showed sinus bradycardia with single PVC, LAD, low voltage QRS  CTCAP showed large right and small left pleural effusions, near complete collapse of the right lower lobe, cardiomegaly, and trace ascites  CT head and cervical/thoracic/lumbar spine " unremarkable  Interventions: 2 L NC, lasix 40 IV     Past Medical History  DVT on life long eliquis, vascular dementia, HTN, GERD, multiple recent falls     Surgical History  Surgical History[1]     Social History      Allergies  Novacaine     Review of Systems  Review of Systems   All other systems reviewed and are negative.          Objective    Temp:  [36.4 °C (97.5 °F)-36.8 °C (98.2 °F)] 36.4 °C (97.5 °F)  Heart Rate:  [0-96] 66  Resp:  [12-31] 17  BP: (159-198)/() 176/157    Physical Exam  Constitutional:       Appearance: Normal appearance.   HENT:      Head: Normocephalic and atraumatic.   Cardiovascular:      Rate and Rhythm: Regular rhythm. Bradycardia present.      Heart sounds: Normal heart sounds. No murmur heard.  Pulmonary:      Effort: Pulmonary effort is normal.      Comments: Extra expiratory squealing breath sounds in all fields  Abdominal:      General: Abdomen is flat. There is distension.      Palpations: Abdomen is soft. There is no mass.      Tenderness: There is abdominal tenderness (denies tenderness but seems to tense abdomen on palpation).   Musculoskeletal:      Cervical back: No tenderness.      Right lower leg: Edema present.      Left lower leg: Edema present.      Comments: 2+ pitting edema up to mid shin bilaterally   Neurological:      Mental Status: She is alert.      Motor: No weakness.      Comments: Oriented to self and place. Not oriented to time.  Right sided facial droop which she states is chronic.   Psychiatric:         Mood and Affect: Mood normal.         Behavior: Behavior normal.       Scheduled medications  Scheduled Medications[2]  Continuous medications  Continuous Medications[3]  PRN medications  PRN Medications[4]        Results for orders placed or performed during the hospital encounter of 07/11/25 (from the past 24 hours)   CBC and Auto Differential   Result Value Ref Range    WBC 10.2 4.4 - 11.3 x10*3/uL    nRBC 0.0 0.0 - 0.0 /100 WBCs    RBC 4.07 4.00 -  5.20 x10*6/uL    Hemoglobin 11.8 (L) 12.0 - 16.0 g/dL    Hematocrit 38.6 36.0 - 46.0 %    MCV 95 80 - 100 fL    MCH 29.0 26.0 - 34.0 pg    MCHC 30.6 (L) 32.0 - 36.0 g/dL    RDW 15.4 (H) 11.5 - 14.5 %    Platelets 212 150 - 450 x10*3/uL    Neutrophils % 86.2 40.0 - 80.0 %    Immature Granulocytes %, Automated 0.4 0.0 - 0.9 %    Lymphocytes % 5.9 13.0 - 44.0 %    Monocytes % 7.1 2.0 - 10.0 %    Eosinophils % 0.1 0.0 - 6.0 %    Basophils % 0.3 0.0 - 2.0 %    Neutrophils Absolute 8.79 (H) 1.60 - 5.50 x10*3/uL    Immature Granulocytes Absolute, Automated 0.04 0.00 - 0.50 x10*3/uL    Lymphocytes Absolute 0.60 (L) 0.80 - 3.00 x10*3/uL    Monocytes Absolute 0.72 0.05 - 0.80 x10*3/uL    Eosinophils Absolute 0.01 0.00 - 0.40 x10*3/uL    Basophils Absolute 0.03 0.00 - 0.10 x10*3/uL   Comprehensive metabolic panel   Result Value Ref Range    Glucose 127 (H) 74 - 99 mg/dL    Sodium 140 136 - 145 mmol/L    Potassium 4.3 3.5 - 5.3 mmol/L    Chloride 98 98 - 107 mmol/L    Bicarbonate 33 (H) 21 - 32 mmol/L    Anion Gap 13 10 - 20 mmol/L    Urea Nitrogen 19 6 - 23 mg/dL    Creatinine 0.99 0.50 - 1.05 mg/dL    eGFR 60 (L) >60 mL/min/1.73m*2    Calcium 8.8 8.6 - 10.3 mg/dL    Albumin 3.8 3.4 - 5.0 g/dL    Alkaline Phosphatase 60 33 - 136 U/L    Total Protein 6.8 6.4 - 8.2 g/dL    AST 19 9 - 39 U/L    Bilirubin, Total 0.9 0.0 - 1.2 mg/dL    ALT 7 7 - 45 U/L   Magnesium   Result Value Ref Range    Magnesium 2.04 1.60 - 2.40 mg/dL   Troponin I, High Sensitivity   Result Value Ref Range    Troponin I, High Sensitivity 16 (H) 0 - 13 ng/L   Type And Screen   Result Value Ref Range    ABO TYPE AB     Rh TYPE POS     ANTIBODY SCREEN NEG    B-type natriuretic peptide   Result Value Ref Range    BNP 1,206 (H) 0 - 99 pg/mL   Urinalysis with Reflex Culture and Microscopic   Result Value Ref Range    Color, Urine Light-Yellow Light-Yellow, Yellow, Dark-Yellow    Appearance, Urine Clear Clear    Specific Gravity, Urine 1.050 (N) 1.005 - 1.035    pH,  Urine 5.0 5.0, 5.5, 6.0, 6.5, 7.0, 7.5, 8.0    Protein, Urine NEGATIVE NEGATIVE, 10 (TRACE), 20 (TRACE) mg/dL    Glucose, Urine Normal Normal mg/dL    Blood, Urine 0.03 (TRACE) (A) NEGATIVE mg/dL    Ketones, Urine NEGATIVE NEGATIVE mg/dL    Bilirubin, Urine NEGATIVE NEGATIVE mg/dL    Urobilinogen, Urine Normal Normal mg/dL    Nitrite, Urine NEGATIVE NEGATIVE    Leukocyte Esterase, Urine NEGATIVE NEGATIVE   Urinalysis Microscopic   Result Value Ref Range    WBC, Urine 1-5 1-5, NONE /HPF    RBC, Urine 3-5 NONE, 1-2, 3-5 /HPF    Squamous Epithelial Cells, Urine 1-9 (SPARSE) Reference range not established. /HPF   Protime-INR   Result Value Ref Range    Protime 23.3 (H) 9.8 - 12.4 seconds    INR 2.1 (H) 0.9 - 1.1   Troponin I, High Sensitivity   Result Value Ref Range    Troponin I, High Sensitivity 18 (H) 0 - 13 ng/L       CT chest abdomen pelvis w IV contrast  Result Date: 7/11/2025  STUDY: CT Chest, Abdomen, and Pelvis with IV Contrast, CT Thoracic Spine and Lumbar Spine without IV Contrast; 7/11/2025 10:53 AM INDICATION: Hypoxia.  Back pain.  Additional History:  Dementia. COMPARISON: None Available. ACCESSION NUMBER(S): [id number], [id number], [id number] ORDERING CLINICIAN: DANYEL TERRAZAS TECHNIQUE: CT of the chest, abdomen, and pelvis was performed.  Contiguous axial images were obtained at 3 mm slice thickness through the chest, abdomen, and pelvis.  Coronal and sagittal reconstructions at 3 mm slice thickness were performed.  Omnipaque 350 100 mL was administered intravenously.  Please note that spinal images were generated from the original CT abdomen and pelvis imaging.  FINDINGS: CHEST: MEDIASTINUM: The heart is enlarged with multichamber enlargement.  There is no evidence for pericardial effusion.  Coronary artery calcifications are present.  Central vascular structures opacify normally.  In the retrocardiac region, there is a small sliding hiatal hernia. LUNGS/PLEURA: Large right and small left  pleural effusions layer dependently in the bilateral hemithoraces.  There is no pleural thickening or pneumothorax.  The airways are patent. Near-complete collapse and compressive atelectasis identified at the right lower lobe.  Subsegmental atelectasis identified at the right middle lobe and left lower lobe.  The lungs are otherwise clear without consolidation, interstitial disease, or suspicious nodules. LYMPH NODES: Thoracic lymph nodes are not enlarged. ABDOMEN:  LIVER: No hepatomegaly.  Smooth surface contour.  Normal attenuation.  BILE DUCTS: No intrahepatic or extrahepatic biliary ductal dilatation.  GALLBLADDER: The gallbladder is remarkable. STOMACH: No abnormalities identified.  PANCREAS: No masses or ductal dilatation.  SPLEEN: No splenomegaly or focal splenic lesion.  ADRENAL GLANDS: No thickening or nodules.  KIDNEYS AND URETERS: Kidneys are normal in size and location.  No renal or ureteral calculi.  PELVIS:  BLADDER: No abnormalities identified.  REPRODUCTIVE ORGANS: No abnormalities identified.  BOWEL: No abnormalities identified.  Appendix is well-visualized and is within normal limits.  VESSELS: No abnormalities identified.  Abdominal aorta is normal in caliber.  PERITONEUM/RETROPERITONEUM/LYMPH NODES: Trace free fluid in the pelvis represents peritoneal ascites.  No pneumoperitoneum. No lymphadenopathy.  ABDOMINAL WALL: No abnormalities identified. SOFT TISSUES: No abnormalities identified.  BONES: No acute fracture or aggressive osseous lesion. THORACIC SPINE: The alignment is anatomic.  There is no fracture or traumatic subluxation. The vertebral body heights are well maintained.  Disc spaces are preserved.  No significant central canal stenosis is demonstrated. The neural foramina are patent throughout.  The paravertebral soft tissues are within normal limits. LUMBAR SPINE: Rightward convexity of the lumbar spine represents dextroscoliosis. There is no fracture or traumatic subluxation. The  vertebral body heights are well maintained.  Intervertebral disc narrowing with vacuum disc phenomenon extends from T12-L1 through L5-S1.  Moderate right and mild left neural foraminal stenosis identified at L3-L4, L4-L5 and L5-S1. The paravertebral soft tissues are within normal limits.    1.  Large right and small left pleural effusions. 2.  Near-complete collapse and compressive atelectasis of the right lower lobe. 3.  Cardiomegaly with multichamber enlargement. 4.  Coronary artery calcifications. 5.  No evidence for acute fracture or traumatic subluxation of the thoracic or lumbar spine. 6.  Multilevel discogenic degenerative changes with mild scoliosis of the lumbar spine. 7.  Trace peritoneal ascites. Signed by Gabino Chavez MD    CT lumbar spine retrospective reconstruction protocol  Result Date: 7/11/2025  STUDY: CT Chest, Abdomen, and Pelvis with IV Contrast, CT Thoracic Spine and Lumbar Spine without IV Contrast; 7/11/2025 10:53 AM INDICATION: Hypoxia.  Back pain.  Additional History:  Dementia. COMPARISON: None Available. ACCESSION NUMBER(S): [id number], [id number], [id number] ORDERING CLINICIAN: DANYEL TERRAZAS TECHNIQUE: CT of the chest, abdomen, and pelvis was performed.  Contiguous axial images were obtained at 3 mm slice thickness through the chest, abdomen, and pelvis.  Coronal and sagittal reconstructions at 3 mm slice thickness were performed.  Omnipaque 350 100 mL was administered intravenously.  Please note that spinal images were generated from the original CT abdomen and pelvis imaging.  FINDINGS: CHEST: MEDIASTINUM: The heart is enlarged with multichamber enlargement.  There is no evidence for pericardial effusion.  Coronary artery calcifications are present.  Central vascular structures opacify normally.  In the retrocardiac region, there is a small sliding hiatal hernia. LUNGS/PLEURA: Large right and small left pleural effusions layer dependently in the bilateral hemithoraces.   There is no pleural thickening or pneumothorax.  The airways are patent. Near-complete collapse and compressive atelectasis identified at the right lower lobe.  Subsegmental atelectasis identified at the right middle lobe and left lower lobe.  The lungs are otherwise clear without consolidation, interstitial disease, or suspicious nodules. LYMPH NODES: Thoracic lymph nodes are not enlarged. ABDOMEN:  LIVER: No hepatomegaly.  Smooth surface contour.  Normal attenuation.  BILE DUCTS: No intrahepatic or extrahepatic biliary ductal dilatation.  GALLBLADDER: The gallbladder is remarkable. STOMACH: No abnormalities identified.  PANCREAS: No masses or ductal dilatation.  SPLEEN: No splenomegaly or focal splenic lesion.  ADRENAL GLANDS: No thickening or nodules.  KIDNEYS AND URETERS: Kidneys are normal in size and location.  No renal or ureteral calculi.  PELVIS:  BLADDER: No abnormalities identified.  REPRODUCTIVE ORGANS: No abnormalities identified.  BOWEL: No abnormalities identified.  Appendix is well-visualized and is within normal limits.  VESSELS: No abnormalities identified.  Abdominal aorta is normal in caliber.  PERITONEUM/RETROPERITONEUM/LYMPH NODES: Trace free fluid in the pelvis represents peritoneal ascites.  No pneumoperitoneum. No lymphadenopathy.  ABDOMINAL WALL: No abnormalities identified. SOFT TISSUES: No abnormalities identified.  BONES: No acute fracture or aggressive osseous lesion. THORACIC SPINE: The alignment is anatomic.  There is no fracture or traumatic subluxation. The vertebral body heights are well maintained.  Disc spaces are preserved.  No significant central canal stenosis is demonstrated. The neural foramina are patent throughout.  The paravertebral soft tissues are within normal limits. LUMBAR SPINE: Rightward convexity of the lumbar spine represents dextroscoliosis. There is no fracture or traumatic subluxation. The vertebral body heights are well maintained.  Intervertebral disc  narrowing with vacuum disc phenomenon extends from T12-L1 through L5-S1.  Moderate right and mild left neural foraminal stenosis identified at L3-L4, L4-L5 and L5-S1. The paravertebral soft tissues are within normal limits.    1.  Large right and small left pleural effusions. 2.  Near-complete collapse and compressive atelectasis of the right lower lobe. 3.  Cardiomegaly with multichamber enlargement. 4.  Coronary artery calcifications. 5.  No evidence for acute fracture or traumatic subluxation of the thoracic or lumbar spine. 6.  Multilevel discogenic degenerative changes with mild scoliosis of the lumbar spine. 7.  Trace peritoneal ascites. Signed by Gabino Chavez MD    CT thoracic spine retrospective reconstruction protocol  Result Date: 7/11/2025  STUDY: CT Chest, Abdomen, and Pelvis with IV Contrast, CT Thoracic Spine and Lumbar Spine without IV Contrast; 7/11/2025 10:53 AM INDICATION: Hypoxia.  Back pain.  Additional History:  Dementia. COMPARISON: None Available. ACCESSION NUMBER(S): [id number], [id number], [id number] ORDERING CLINICIAN: DANYEL TERRAZAS TECHNIQUE: CT of the chest, abdomen, and pelvis was performed.  Contiguous axial images were obtained at 3 mm slice thickness through the chest, abdomen, and pelvis.  Coronal and sagittal reconstructions at 3 mm slice thickness were performed.  Omnipaque 350 100 mL was administered intravenously.  Please note that spinal images were generated from the original CT abdomen and pelvis imaging.  FINDINGS: CHEST: MEDIASTINUM: The heart is enlarged with multichamber enlargement.  There is no evidence for pericardial effusion.  Coronary artery calcifications are present.  Central vascular structures opacify normally.  In the retrocardiac region, there is a small sliding hiatal hernia. LUNGS/PLEURA: Large right and small left pleural effusions layer dependently in the bilateral hemithoraces.  There is no pleural thickening or pneumothorax.  The airways are  patent. Near-complete collapse and compressive atelectasis identified at the right lower lobe.  Subsegmental atelectasis identified at the right middle lobe and left lower lobe.  The lungs are otherwise clear without consolidation, interstitial disease, or suspicious nodules. LYMPH NODES: Thoracic lymph nodes are not enlarged. ABDOMEN:  LIVER: No hepatomegaly.  Smooth surface contour.  Normal attenuation.  BILE DUCTS: No intrahepatic or extrahepatic biliary ductal dilatation.  GALLBLADDER: The gallbladder is remarkable. STOMACH: No abnormalities identified.  PANCREAS: No masses or ductal dilatation.  SPLEEN: No splenomegaly or focal splenic lesion.  ADRENAL GLANDS: No thickening or nodules.  KIDNEYS AND URETERS: Kidneys are normal in size and location.  No renal or ureteral calculi.  PELVIS:  BLADDER: No abnormalities identified.  REPRODUCTIVE ORGANS: No abnormalities identified.  BOWEL: No abnormalities identified.  Appendix is well-visualized and is within normal limits.  VESSELS: No abnormalities identified.  Abdominal aorta is normal in caliber.  PERITONEUM/RETROPERITONEUM/LYMPH NODES: Trace free fluid in the pelvis represents peritoneal ascites.  No pneumoperitoneum. No lymphadenopathy.  ABDOMINAL WALL: No abnormalities identified. SOFT TISSUES: No abnormalities identified.  BONES: No acute fracture or aggressive osseous lesion. THORACIC SPINE: The alignment is anatomic.  There is no fracture or traumatic subluxation. The vertebral body heights are well maintained.  Disc spaces are preserved.  No significant central canal stenosis is demonstrated. The neural foramina are patent throughout.  The paravertebral soft tissues are within normal limits. LUMBAR SPINE: Rightward convexity of the lumbar spine represents dextroscoliosis. There is no fracture or traumatic subluxation. The vertebral body heights are well maintained.  Intervertebral disc narrowing with vacuum disc phenomenon extends from T12-L1 through  L5-S1.  Moderate right and mild left neural foraminal stenosis identified at L3-L4, L4-L5 and L5-S1. The paravertebral soft tissues are within normal limits.    1.  Large right and small left pleural effusions. 2.  Near-complete collapse and compressive atelectasis of the right lower lobe. 3.  Cardiomegaly with multichamber enlargement. 4.  Coronary artery calcifications. 5.  No evidence for acute fracture or traumatic subluxation of the thoracic or lumbar spine. 6.  Multilevel discogenic degenerative changes with mild scoliosis of the lumbar spine. 7.  Trace peritoneal ascites. Signed by Gabino Chavez MD    CT cervical spine wo IV contrast  Result Date: 7/11/2025  Interpreted By:  Charbel Smith, STUDY: CT CERVICAL SPINE WO IV CONTRAST;  7/11/2025 10:52 am   INDICATION: Signs/Symptoms:HIA.     COMPARISON: Prior exam is from 07/28/2024.   ACCESSION NUMBER(S): [id number]   ORDERING CLINICIAN: DANYEL TERRAZAS   TECHNIQUE: Thin section axial images were obtained from the skull base down through the thoracic inlet. Sagittal and coronal reconstruction images were generated. Soft tissue, lung, and bone windows were reviewed.   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 There is moderate disc space narrowing with endplate spurring at C3-4 and C6-7 with more mild changes at C4-5 and C5-6. There is trace anterolisthesis of C5 over C6 and mild retrolisthesis of C6 over C7. There is mild-to-moderate disc space narrowing with endplate spurring and vacuum disc phenomenon at C7-T1. There is uncovertebral hypertrophy with spurring at C3-4, on the left at C5-6, and bilaterally at C6-7. Multilevel interfacet hypertrophy with spur formation. 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 No cervical spine compression fracture. No posterior element fracture. No destructive bone lesion.     SPINAL CANAL: No gross disc herniation.   NECK  SOFT TISSUES: Within normal limits.   LUNG APICES: Moderate-sized right pleural effusion. No left pleural effusion. No apical pneumothorax or mass on either side..   SKULL BASE: Within normal limits.       DJD in the cervical spine as described.   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   No CT evidence of cervical spine fracture in this exam.   Moderate-sized right pleural effusion.   MACRO: None   Signed by: Charbel Smith 7/11/2025 10:59 AM Dictation workstation:   [id number]    CT head wo IV contrast  Result Date: 7/11/2025  Interpreted By:  Charbel Smith, STUDY: CT HEAD WO IV CONTRAST;  7/11/2025 10:38 am   INDICATION: Signs/Symptoms:HIA.   COMPARISON: None.   ACCESSION NUMBER(S): [id number]   ORDERING CLINICIAN: DANYEL TERRAZAS   TECHNIQUE: Routine axial images were obtained from the skull base through the vertex.  Sagittal and coronal reconstruction images were generated. Brain, subdural, and bone windows were reviewed. N/A   N/A   FINDINGS: INTRACRANIAL: Mild prominence of ventricles and sulci.  There is cavum septum pellucidum and cavum vergae, an anatomic variant of normal. No acute intracranial bleed, midline shift, or focal mass effect. No destructive bone lesion. No depressed skull fracture. Skullbase arterial calcifications in the carotid siphons and vertebral arteries.   EXTRACRANIAL: Visualized paranasal sinuses were clear. Visualized mastoid air cells were clear. Stable old right medial wall orbital fracture deformity.       No depressed skull fracture. No acute intracranial bleed or focal mass effect.   Mild volume loss.     MACRO: None   Signed by: Charbel Smith 7/11/2025 10:51 AM Dictation workstation:   [id number]       Assessment/Plan  Stella Agarwal is a 74 y.o. female with a PMH of DVT on life-long eliquis, vascular dementia, multiple falls, HTN, and GERD who presented to [facility] on 7/11/2025 for syncope  after mechanical fall. In ED, patient noted to have significant bradycardia in 40s and found to have mildly elevated troponin with significantly elevated BNP and evidence of hypervolemia on exam and imaging. Patient, daughter, and nursing home deny any history of CHF. Admitted to medicine for diuresis iso of suspected CHF and workup of syncope.     #new onset acute CHF exacerbation  #large right and small left pleural effusion  - diuresis with lasix 40 IV daily  - monitor I&Os   - consult pulmonology for possible thoracentesis  - hold eliquis for possible thora    #bradycardia  #syncope  #elevated troponin  - troponin of 16 in ED, continue to trend trops  - EKG sinus bradycardia w/o ischemic change  - currently asymptomatic  - telemetry  - hold donepezil  - consult cardiology    #mechanical fall with head injury  #multiple recent falls  - CT head and c/t/l spine showed no acute processes  - PT/OT consult     CHRONIC PROBLEMS:  #DVTs  - holding eliquis, continue ASA  #HTN  - continue home losartan  #Depression  - continue home fluoxetine  #Dementia   - continue home zyprexa     Global Plan of Care  IVF: PRN  Diet: Regular  DVT prophylaxis: Lovenox  Bowel regimen: Miralax  Consults: Pulmonology, Cardiology  Dispo: 74 year old female admitted for workup of syncope and suspected new onset CHF exacerbation found after mechanical fall. Estimated length of stay >48 hours.   Code Status: FULL CODE        Aguilar Burton  MS4           [1] History reviewed. No pertinent surgical history.  [2] [3] [4]

## 2025-07-11 NOTE — Clinical Note
A LEAD, CAPSUREFIX NOVUS, 52 CM - [id number] lead was placed using fluoroscopy and intracardiac electrograms to find an optimal electrical and physically stable position.

## 2025-07-11 NOTE — ED PROVIDER NOTES
Emergency Department Provider Note       History of Present Illness   History provided by: Patient  Limitations to History: Dementia  External Records Reviewed with Brief Summary: Med list documenting Eliquis use    HPI:  Stella Agarwal is a 74 y.o. female with past medical history of hypertension, prior DVT on Eliquis, GERD, vascular dementia presenting to the ED as a HIA Activation after she had a syncopal episode when she got up this morning.  She states she passed out, woke up having fallen over her walker.  She is alert and oriented to person, place, time, but unable to provide additional details as to the events.  Denies any current pain at this time.    Physical Exam   Arrival Vitals:  T 36.8 °C (98.2 °F)  HR (!) 46  BP (!) 172/97  RR 18  O2 (!) 84 % None (Room air)    Primary Survey:  Airway: Intact & patent  Breathing: Equal breath sounds bilaterally  Circulation: 2+ radial and DP pulses bilaterally  Disability: GCS 15.  Gross motor and sensation intact in the bilateral upper and lower extremities.  Exposure: Patient fully exposed, warm blankets applied    Secondary Survey:  Head: Atraumatic. No cephalohematoma.  Eye: Pupils equal, round, and reactive to light. Gaze is conjugate. No orbital ridge bony step-offs, or tenderness.  ENT:   Midface is stable.  No mandibular tenderness or dental malocclusion's.  There is no nasal bone tenderness or deformity.  No epistaxis.  No blood or CSF drainage and external auditory canals.  No intraoral lesions.  There is an ecchymoses to the left external auricle  Neck: There is no C-spine midline tenderness to palpation, step-offs, or deformities.  Trachea is midline.  Chest: Clear to auscultation bilaterally, no chest wall tenderness palpation, crepitus, flail segments noted.  No bruising or abrasions noted to the anterior chest wall.  Cardiovascular: Regular rate, rhythm  Abdomen: Soft, nontender, nondistended.  No bruising or lacerations noted.  Not  peritonitic.  Pelvis: Stable to compression  Back/spine: No midline T or L-spine tenderness palpation, step-offs, or deformities.  No lacerations, abrasions, or bruising noted.  Extremities: No gross bony deformities, no bony tenderness palpation.  Full range of motion all in 4 extremities.  Skin: No lacerations, bruises, abrasions noted.  Neuro: Alert and oriented to person, place, time.  Mildly confused otherwise.  Face symmetric, speech fluent.  Gross motor and sensory function intact in the bilateral upper and lower extremities.      Medical Decision Making & ED Course   Medical Decision Makin y.o. female presenting to the ED as a HIA Activation after syncopal episode resulting in fall.  She is on Eliquis..  On arrival to the ED, she is bradycardic, asymptomatic and hemodynamically stable.  Will workup for syncope with labs.  EKG demonstrated bradycardia only.  Will pan scan given she is a poor historian to rule out traumatic injury.    For the remainder of the patient's ED course and medical decision making, please see updates in the ED course section below.      --------      EKG Independent Interpretation: EKG interpreted by myself. Please see ED Course for full interpretation.    Independent Result Review and Interpretation: Relevant laboratory and radiographic results were reviewed and independently interpreted by myself.  As necessary, they are commented on in the ED Course.    Social Determinants of Health which Significantly Impact Care: Social Determinants of Health which Significantly Impact Care: None identified     Chronic conditions affecting the patient's care: As documented above in [facility]    The patient was discussed with the following consultants/services: Hospitalist/Admitting Provider who accepted the patient for admission    Care Considerations: As documented above in [facility]    ED Course:  ED Course as of 25 1242      1028 ECG 12 lead  EKG obtained at 1019 that  demonstrates sinus bradycardia, isolated PVC, ventricular rate of 44, leftward axis, normal intervals, no signs of ischemia. [SH]   1136 Reviewed CT chest abdomen pelvis, there is a large right-sided pleural effusion, but I do not see any associated rib fractures, patient not in respiratory distress or complaint of shortness of breath bringing question the chronicity of this pleural effusion.  Will wait on final read. [SH]   1159 Discussed with radiologist that read the scan, he confirmed there is no evidence of rib fractures or traumatic lung injury.  Based on the Hounsfield units, he suspects most likely the effusions are simple pleural effusions.  Based on this, will defer chest tube at this time given patient is not significantly hypoxic although she is mildly tachypneic. [SH]   1241 Patient's workup ultimately revealed volume overload as a likely cause of her pleural effusions and shortness of breath.  She has no traumatic injuries noted on CT pan scan.  Patient diuresis initiated.  Troponin was elevated.  Will defer aspirin at this time given EKG is nonischemic and suspect demand ischemia in the setting of volume overload.  Discussed with hospitalist and admitted for diuresis, syncope workup.  She remains bradycardic, but asymptomatic from this. [SH]      ED Course User Index  [SH] Isaac Patterson MD         Diagnoses as of 07/11/25 1242   Fall, initial encounter   Acute respiratory failure with hypoxia   Hypervolemia, unspecified hypervolemia type   Bradycardia   Pleural effusion   Troponin level elevated   Acute systolic heart failure         Disposition   As a result of their workup, the patient will require admission to the hospital.  The patient was informed of her diagnosis.  The patient was given the opportunity to ask questions and I answered them. The patient agreed to be admitted to the hospital.    Procedures   Critical Care    Performed by: Isaac Patterson MD  Authorized by: Isaac Patterson MD     Critical care provider statement:     Critical care time (minutes):  21    Critical care time was exclusive of:  Separately billable procedures and treating other patients and teaching time    Critical care was necessary to treat or prevent imminent or life-threatening deterioration of the following conditions:  Trauma (Possible need for chest tube in setting of fall and anticoagulant use)    Critical care was time spent personally by me on the following activities:  Development of treatment plan with patient or surrogate, evaluation of patient's response to treatment, examination of patient, obtaining history from patient or surrogate, ordering and performing treatments and interventions, ordering and review of laboratory studies, ordering and review of radiographic studies, pulse oximetry, re-evaluation of patient's condition and review of old charts          Isaac Patterson MD  Emergency Medicine                                                       Isaac Patterson MD  07/11/25 5720

## 2025-07-11 NOTE — CARE PLAN
The patient's goals for the shift include  get sleep    The clinical goals for the shift include Remain safe throughout shift      Problem: Heart Failure  Goal: Improved gas exchange this shift  Outcome: Progressing  Goal: Improved urinary output this shift  Outcome: Progressing  Goal: Reduction in peripheral edema within 24 hours  Outcome: Progressing  Goal: Report improvement of dyspnea/breathlessness this shift  Outcome: Progressing  Goal: Weight from fluid excess reduced over 2-3 days, then stabilize  Outcome: Progressing     Problem: Pain - Adult  Goal: Verbalizes/displays adequate comfort level or baseline comfort level  Outcome: Progressing     Problem: Safety - Adult  Goal: Free from fall injury  Outcome: Progressing     Problem: Chronic Conditions and Co-morbidities  Goal: Patient's chronic conditions and co-morbidity symptoms are monitored and maintained or improved  Outcome: Progressing     Problem: Skin  Goal: Participates in plan/prevention/treatment measures  Flowsheets (Taken 7/11/2025 0935)  Participates in plan/prevention/treatment measures: Elevate heels

## 2025-07-11 NOTE — Clinical Note
A LEAD, CAPSUREFIX NOVUS, 58 CM - [id number] lead was placed using fluoroscopy and intracardiac electrograms to find an optimal electrical and physically stable position.

## 2025-07-11 NOTE — PROGRESS NOTES
Pharmacy Medication History Review    Stella Agarwal is a 74 y.o. female admitted for Acute systolic heart failure. Pharmacy reviewed the patient's baudh-df-frxsypuly medications and allergies for accuracy.    The list below reflectives the updated PTA list. Please review each medication in order reconciliation for additional clarification and justification.  Prior to Admission Medications   Prescriptions Last Dose Informant Patient Reported? Taking?   FLUoxetine (PROzac) 10 mg capsule Unknown Other Yes Yes   Sig: Take 1 capsule (10 mg) by mouth once daily in the morning. Take with 20mg to equal 30mg   FLUoxetine (PROzac) 20 mg capsule Unknown Other Yes Yes   Sig: (1) CAPSULE BY MOUTH EVERY MORNING FOR ANXIETY / DEPRESSION   Patient taking differently: Take 1 capsule (20 mg) by mouth once daily. Take with 10mg to equal 30mg every morning   OLANZapine (ZyPREXA) 10 mg tablet Unknown Other Yes Yes   Sig: (1) TABLET BY MOUTH AT BEDTIME PSYCHOSIS   acetaminophen (Tylenol) 325 mg tablet Unknown Other Yes Yes   Sig: Take 2 tablets (650 mg) by mouth every 4 hours if needed for mild pain (1 - 3).   alum-mag hydroxide-simeth (Mylanta) 200-200-20 mg/5 mL oral suspension Unknown Other Yes Yes   Sig: Take 15 mL by mouth 2 times a day as needed for indigestion or heartburn.   apixaban (Eliquis) 2.5 mg tablet Unknown Other Yes Yes   Sig: (1) TABLET BY MOUTH TWICE DAILY   aspirin 81 mg EC tablet Unknown Other Yes Yes   Sig: (1) TABLET BY MOUTH ONCE DAILY   benzonatate (Tessalon) 100 mg capsule Unknown Other Yes Yes   Sig: Take 1 capsule (100 mg) by mouth 3 times a day as needed for cough. Do not crush or chew.   bisacodyl (OneLAX bisacodyL) 10 mg suppository Unknown Other Yes Yes   Sig: Insert 1 suppository (10 mg) into the rectum once daily as needed for constipation.   diphenhydrAMINE (BENADryl) 25 mg capsule Unknown Other Yes Yes   Sig: Take 1 capsule (25 mg) by mouth every 6 hours if needed for itching.   donepezil (Aricept) 5  mg tablet Unknown Other Yes Yes   Sig: Take 1 tablet (5 mg) by mouth once daily at bedtime.   furosemide (Lasix) 20 mg tablet Unknown Other Yes Yes   Sig: (1) TABLET BY MOUTH ONCE DAILY   hydrocortisone 1 % cream Unknown Other Yes Yes   Sig: Apply 1 Application topically 4 times a day as needed.   ibuprofen 200 mg tablet Unknown Other Yes Yes   Sig: Take 2 tablets (400 mg) by mouth every 4 hours if needed for mild pain (1 - 3).   loperamide (Imodium) 2 mg capsule Unknown Other Yes Yes   Sig: Take 1 capsule (2 mg) by mouth once daily as needed for diarrhea.   loratadine (Claritin) 10 mg tablet Unknown Other Yes Yes   Sig: Take 1 tablet (10 mg) by mouth once daily as needed for allergies.   losartan (Cozaar) 100 mg tablet Unknown Other Yes Yes   Sig: (1) TABLET BY MOUTH ONCE DAILY   magnesium hydroxide (Milk of Magnesia) 400 mg/5 mL suspension Unknown Other Yes Yes   Sig: Take 30 mL by mouth once daily as needed for constipation.   metoprolol succinate XL (Toprol-XL) 50 mg 24 hr tablet Unknown Other Yes Yes   Sig: (1) TABLET BY MOUTH ONCE DAILY   omeprazole (PriLOSEC) 40 mg DR capsule Unknown Other Yes Yes   Sig: Take 1 capsule (40 mg) by mouth once daily.   sennosides (Senokot) 8.6 mg tablet Unknown Other Yes Yes   Sig: Take 2 tablets (17.2 mg) by mouth 2 times a day as needed for constipation.   sodium phosphates (Fleet Enema) 19-7 gram/118 mL enema enema Unknown Other Yes Yes   Sig: Insert 133 mL (1 enema) into the rectum once daily as needed for constipation.      Facility-Administered Medications: None           The list below reflectives the updated allergy list. Please review each documented allergy for additional clarification and justification.  Allergies  Reviewed by Charity Jiménez RN on 7/11/2025        Severity Reactions Comments    Procaine Not Specified Unknown             Below are additional concerns with the patient's PTA list.      Lashanda Bowers

## 2025-07-11 NOTE — PROGRESS NOTES
07/11/25 1548   Discharge Planning   Living Arrangements Other (Comment)  (From [facility])   Support Systems Family members   Assistance Needed Per [facility] at baseline patient is A&OX2, needs assistance with ADLs(prompt and standby, feeds self and ambulates independently with rollator. No active C agency. PCP Mustapha Billings   Type of Residence Assisted living   Care Facility Name [facility]   Who is requesting discharge planning? Provider   Home or Post Acute Services None   Expected Discharge Disposition Home  ([facility] [place]. Confirmed plan with sister Mini.)   Does the patient need discharge transport arranged? Yes   [employer] coordination needed? Yes   Has discharge transport been arranged? No   Financial Resource Strain   How hard is it for you to pay for the very basics like food, housing, medical care, and heating? Not very   Housing Stability   In the last 12 months, was there a time when you were not able to pay the mortgage or rent on time? N   In the past 12 months, how many times have you moved where you were living? 0   At any time in the past 12 months, were you homeless or living in a shelter (including now)? N   Transportation Needs   In the past 12 months, has lack of transportation kept you from medical appointments or from getting medications? no   In the past 12 months, has lack of transportation kept you from meetings, work, or from getting things needed for daily living? No   Patient Choice   Provider Choice list and CMS website (https://medicare.gov/care-compare#search) for post-acute Quality and Resource Measure Data were provided and reviewed with: Family  (Sister Mini)   Patient / Family choosing to utilize agency / facility established prior to hospitalization Yes  ([facility])   Stroke Family Assessment   Stroke Family Assessment Needed No   Intensity of Service   Intensity of  Service 0-30 min

## 2025-07-12 ENCOUNTER — APPOINTMENT (OUTPATIENT)
Dept: RADIOLOGY | Facility: HOSPITAL | Age: 75
DRG: 242 | End: 2025-07-12
Payer: COMMERCIAL

## 2025-07-12 LAB
ALBUMIN SERPL BCP-MCNC: 3.2 G/DL (ref 3.4–5)
ANION GAP SERPL CALC-SCNC: 11 MMOL/L (ref 10–20)
BUN SERPL-MCNC: 15 MG/DL (ref 6–23)
CALCIUM SERPL-MCNC: 8.2 MG/DL (ref 8.6–10.3)
CARDIAC TROPONIN I PNL SERPL HS: 16 NG/L (ref 0–13)
CHLORIDE SERPL-SCNC: 98 MMOL/L (ref 98–107)
CLARITY FLD: ABNORMAL
CO2 SERPL-SCNC: 36 MMOL/L (ref 21–32)
COLOR FLD: YELLOW
CREAT SERPL-MCNC: 0.89 MG/DL (ref 0.5–1.05)
EGFRCR SERPLBLD CKD-EPI 2021: 69 ML/MIN/1.73M*2
EOSINOPHIL NFR FLD MANUAL: NORMAL %
ERYTHROCYTE [DISTWIDTH] IN BLOOD BY AUTOMATED COUNT: 14.9 % (ref 11.5–14.5)
GLUCOSE FLD-MCNC: 144 MG/DL
GLUCOSE SERPL-MCNC: 97 MG/DL (ref 74–99)
GRAM STN SPEC: NORMAL
GRAM STN SPEC: NORMAL
HCT VFR BLD AUTO: 37.7 % (ref 36–46)
HGB BLD-MCNC: 11.3 G/DL (ref 12–16)
HOLD SPECIMEN: NORMAL
LDH FLD L TO P-CCNC: 83 U/L
LDH SERPL L TO P-CCNC: 226 U/L (ref 84–246)
LYMPHOCYTES NFR FLD MANUAL: 16 %
MAGNESIUM SERPL-MCNC: 1.79 MG/DL (ref 1.6–2.4)
MCH RBC QN AUTO: 28.4 PG (ref 26–34)
MCHC RBC AUTO-ENTMCNC: 30 G/DL (ref 32–36)
MCV RBC AUTO: 95 FL (ref 80–100)
MONOS+MACROS NFR FLD MANUAL: 75 %
NEUTROPHILS NFR FLD MANUAL: 9 %
NRBC BLD-RTO: 0 /100 WBCS (ref 0–0)
PH FLD: 8.09 [PH]
PHOSPHATE SERPL-MCNC: 4.4 MG/DL (ref 2.5–4.9)
PLATELET # BLD AUTO: 207 X10*3/UL (ref 150–450)
POTASSIUM SERPL-SCNC: 3.9 MMOL/L (ref 3.5–5.3)
PROT FLD-MCNC: 2.4 G/DL
PROT SERPL-MCNC: 6.3 G/DL (ref 6.4–8.2)
RBC # BLD AUTO: 3.98 X10*6/UL (ref 4–5.2)
RBC # FLD MANUAL: 392 /UL
SODIUM SERPL-SCNC: 141 MMOL/L (ref 136–145)
TOTAL CELLS COUNTED FLD: 100
WBC # BLD AUTO: 9 X10*3/UL (ref 4.4–11.3)
WBC # FLD MANUAL: 409 /UL

## 2025-07-12 PROCEDURE — 83986 ASSAY PH BODY FLUID NOS: CPT | Mod: GEALAB

## 2025-07-12 PROCEDURE — 88305 TISSUE EXAM BY PATHOLOGIST: CPT | Performed by: PATHOLOGY

## 2025-07-12 PROCEDURE — 36415 COLL VENOUS BLD VENIPUNCTURE: CPT

## 2025-07-12 PROCEDURE — 1200000002 HC GENERAL ROOM WITH TELEMETRY DAILY

## 2025-07-12 PROCEDURE — 83615 LACTATE (LD) (LDH) ENZYME: CPT

## 2025-07-12 PROCEDURE — 71045 X-RAY EXAM CHEST 1 VIEW: CPT

## 2025-07-12 PROCEDURE — 99221 1ST HOSP IP/OBS SF/LOW 40: CPT | Performed by: NURSE PRACTITIONER

## 2025-07-12 PROCEDURE — 71045 X-RAY EXAM CHEST 1 VIEW: CPT | Performed by: STUDENT IN AN ORGANIZED HEALTH CARE EDUCATION/TRAINING PROGRAM

## 2025-07-12 PROCEDURE — 97165 OT EVAL LOW COMPLEX 30 MIN: CPT | Mod: GO

## 2025-07-12 PROCEDURE — 83615 LACTATE (LD) (LDH) ENZYME: CPT | Mod: GEALAB

## 2025-07-12 PROCEDURE — 32555 ASPIRATE PLEURA W/ IMAGING: CPT

## 2025-07-12 PROCEDURE — 2500000001 HC RX 250 WO HCPCS SELF ADMINISTERED DRUGS (ALT 637 FOR MEDICARE OP)

## 2025-07-12 PROCEDURE — 84155 ASSAY OF PROTEIN SERUM: CPT

## 2025-07-12 PROCEDURE — 88305 TISSUE EXAM BY PATHOLOGIST: CPT | Mod: TC,MCY

## 2025-07-12 PROCEDURE — 87015 SPECIMEN INFECT AGNT CONCNTJ: CPT | Mod: GEALAB

## 2025-07-12 PROCEDURE — 84484 ASSAY OF TROPONIN QUANT: CPT

## 2025-07-12 PROCEDURE — 82945 GLUCOSE OTHER FLUID: CPT | Mod: GEALAB

## 2025-07-12 PROCEDURE — 2500000002 HC RX 250 W HCPCS SELF ADMINISTERED DRUGS (ALT 637 FOR MEDICARE OP, ALT 636 FOR OP/ED)

## 2025-07-12 PROCEDURE — 94760 N-INVAS EAR/PLS OXIMETRY 1: CPT

## 2025-07-12 PROCEDURE — 87205 SMEAR GRAM STAIN: CPT | Mod: GEALAB

## 2025-07-12 PROCEDURE — 2500000004 HC RX 250 GENERAL PHARMACY W/ HCPCS (ALT 636 FOR OP/ED)

## 2025-07-12 PROCEDURE — 2500000005 HC RX 250 GENERAL PHARMACY W/O HCPCS

## 2025-07-12 PROCEDURE — 88112 CYTOPATH CELL ENHANCE TECH: CPT | Performed by: PATHOLOGY

## 2025-07-12 PROCEDURE — 2500000004 HC RX 250 GENERAL PHARMACY W/ HCPCS (ALT 636 FOR OP/ED): Performed by: BEHAVIOR TECHNICIAN

## 2025-07-12 PROCEDURE — 85027 COMPLETE CBC AUTOMATED: CPT

## 2025-07-12 PROCEDURE — 89050 BODY FLUID CELL COUNT: CPT

## 2025-07-12 PROCEDURE — 32555 ASPIRATE PLEURA W/ IMAGING: CPT | Mod: GC

## 2025-07-12 PROCEDURE — 0W993ZZ DRAINAGE OF RIGHT PLEURAL CAVITY, PERCUTANEOUS APPROACH: ICD-10-PCS | Performed by: STUDENT IN AN ORGANIZED HEALTH CARE EDUCATION/TRAINING PROGRAM

## 2025-07-12 PROCEDURE — 84157 ASSAY OF PROTEIN OTHER: CPT | Mod: GEALAB

## 2025-07-12 PROCEDURE — 87102 FUNGUS ISOLATION CULTURE: CPT | Mod: GEALAB

## 2025-07-12 PROCEDURE — 99222 1ST HOSP IP/OBS MODERATE 55: CPT | Performed by: INTERNAL MEDICINE

## 2025-07-12 PROCEDURE — 80069 RENAL FUNCTION PANEL: CPT

## 2025-07-12 PROCEDURE — 83735 ASSAY OF MAGNESIUM: CPT

## 2025-07-12 PROCEDURE — 99233 SBSQ HOSP IP/OBS HIGH 50: CPT

## 2025-07-12 RX ADMIN — ASPIRIN 81 MG: 81 TABLET, DELAYED RELEASE ORAL at 09:33

## 2025-07-12 RX ADMIN — FUROSEMIDE 40 MG: 10 INJECTION, SOLUTION INTRAMUSCULAR; INTRAVENOUS at 09:33

## 2025-07-12 RX ADMIN — POLYETHYLENE GLYCOL 3350 17 G: 17 POWDER, FOR SOLUTION ORAL at 09:33

## 2025-07-12 RX ADMIN — SODIUM CHLORIDE, SODIUM LACTATE, POTASSIUM CHLORIDE, AND CALCIUM CHLORIDE 500 ML: .6; .31; .03; .02 INJECTION, SOLUTION INTRAVENOUS at 14:19

## 2025-07-12 RX ADMIN — LOSARTAN POTASSIUM 100 MG: 50 TABLET, FILM COATED ORAL at 09:33

## 2025-07-12 RX ADMIN — Medication 3 L/MIN: at 09:23

## 2025-07-12 RX ADMIN — ENOXAPARIN SODIUM 40 MG: 100 INJECTION SUBCUTANEOUS at 16:06

## 2025-07-12 RX ADMIN — OLANZAPINE 10 MG: 5 TABLET, FILM COATED ORAL at 20:46

## 2025-07-12 RX ADMIN — Medication 3 L/MIN: at 20:49

## 2025-07-12 RX ADMIN — Medication 3 L/MIN: at 20:30

## 2025-07-12 RX ADMIN — FLUOXETINE HYDROCHLORIDE 30 MG: 10 CAPSULE ORAL at 09:33

## 2025-07-12 RX ADMIN — PANTOPRAZOLE SODIUM 40 MG: 40 TABLET, DELAYED RELEASE ORAL at 06:20

## 2025-07-12 ASSESSMENT — ENCOUNTER SYMPTOMS
COUGH: 1
DYSPNEA ON EXERTION: 1
ENDOCRINE NEGATIVE: 1
CONSTITUTIONAL NEGATIVE: 1
GASTROINTESTINAL NEGATIVE: 1
MYALGIAS: 1
MEMORY LOSS: 1
SHORTNESS OF BREATH: 1
HEMATOLOGIC/LYMPHATIC NEGATIVE: 1
LOSS OF BALANCE: 1
EYES NEGATIVE: 1

## 2025-07-12 ASSESSMENT — COGNITIVE AND FUNCTIONAL STATUS - GENERAL
DRESSING REGULAR LOWER BODY CLOTHING: A LITTLE
MOBILITY SCORE: 14
DRESSING REGULAR UPPER BODY CLOTHING: A LITTLE
WALKING IN HOSPITAL ROOM: A LOT
STANDING UP FROM CHAIR USING ARMS: A LOT
DAILY ACTIVITIY SCORE: 20
HELP NEEDED FOR BATHING: A LITTLE
TOILETING: A LITTLE
TOILETING: A LITTLE
HELP NEEDED FOR BATHING: A LITTLE
DRESSING REGULAR LOWER BODY CLOTHING: A LOT
CLIMB 3 TO 5 STEPS WITH RAILING: A LOT
TURNING FROM BACK TO SIDE WHILE IN FLAT BAD: A LITTLE
MOVING FROM LYING ON BACK TO SITTING ON SIDE OF FLAT BED WITH BEDRAILS: A LITTLE
PERSONAL GROOMING: A LITTLE
PERSONAL GROOMING: A LITTLE
DAILY ACTIVITIY SCORE: 18
MOVING TO AND FROM BED TO CHAIR: A LOT

## 2025-07-12 ASSESSMENT — PAIN SCALES - GENERAL
PAINLEVEL_OUTOF10: 0 - NO PAIN
PAINLEVEL_OUTOF10: 0 - NO PAIN

## 2025-07-12 ASSESSMENT — ACTIVITIES OF DAILY LIVING (ADL)
ADL_ASSISTANCE: NEEDS ASSISTANCE
BATHING_ASSISTANCE: MINIMAL

## 2025-07-12 ASSESSMENT — PAIN - FUNCTIONAL ASSESSMENT
PAIN_FUNCTIONAL_ASSESSMENT: 0-10
PAIN_FUNCTIONAL_ASSESSMENT: 0-10

## 2025-07-12 NOTE — CONSULTS
Consults  History Of Present Illness:    Stella Agarwal is a very pleasant 74 year old female with a history of DVT (Eliquis), vascular dementia, multiple falls, HTN and GERD, presents to the ER after a syncopal event. The syncope was not witnessed. Patient is a poor historian. This morning denies pain, unsure why she is in the hospital. Noted in the chart she was walking in her room at the nursing home with her walker and slipped. Her walker hit her in the face and fell backwards hitting her head on the floor. She denies any pain. Denies chest pain, heart palpitations or dizziness. Her daughter has noticed she has had shortness of breath for the past three days. The daughter states she has had multiple falls over the past three months. The daughter states she has a cardiologist at [facility].     Review of Systems   Constitutional: Negative.   HENT: Negative.     Eyes: Negative.    Cardiovascular:  Positive for dyspnea on exertion and leg swelling.   Respiratory:  Positive for cough and shortness of breath.    Endocrine: Negative.    Hematologic/Lymphatic: Negative.    Skin: Negative.    Musculoskeletal:  Positive for muscle weakness and myalgias.   Gastrointestinal: Negative.    Neurological:  Positive for loss of balance.   Psychiatric/Behavioral:  Positive for memory loss.          Last Recorded Vitals:  Vitals:    07/11/25 2145 07/12/25 0036 07/12/25 0050 07/12/25 0410   BP:   147/81 138/59   BP Location:   Left arm Left arm   Patient Position:   Lying Lying   Pulse:   98 (!) 49   Resp:   16 17   Temp:   36.6 °C (97.9 °F) 36.5 °C (97.7 °F)   TempSrc:   Temporal Temporal   SpO2: 93%  96% 96%   Weight:  84.5 kg (186 lb 4.6 oz)     Height:           Last Labs:  CBC - 7/12/2025:  6:10 AM  9.0 11.3 207    37.7      CMP - 7/12/2025:  6:10 AM  8.2 6.8 19 --- 0.9   4.4 3.2 7 60      PTT - No results in last year.  2.1   23.3 _     Troponin I, High Sensitivity   Date/Time Value Ref Range Status   07/11/2025 04:49 PM 19 (H) 0 -  "13 ng/L Final   07/11/2025 01:17 PM 18 (H) 0 - 13 ng/L Final   07/11/2025 10:31 AM 16 (H) 0 - 13 ng/L Final     BNP   Date/Time Value Ref Range Status   07/11/2025 10:31 AM 1,206 (H) 0 - 99 pg/mL Final     Hemoglobin A1C   Date/Time Value Ref Range Status   09/04/2024 07:23 AM 5.5 see below % Final      Last I/O:  I/O last 3 completed shifts:  In: - (0 mL/kg)   Out: 1600 (18.9 mL/kg) [Urine:1600 (0.5 mL/kg/hr)]  Weight: 84.5 kg     Past Cardiology Tests (Last 3 Years):  EKG:  No results found for this or any previous visit from the past 1095 days.    Echo:  No results found for this or any previous visit from the past 1095 days.    Ejection Fractions:  No results found for: \"EF\"  Cath:  No results found for this or any previous visit from the past 1095 days.    Stress Test:  No results found for this or any previous visit from the past 1095 days.     Imaging:  CT chest abdomen   1.  Large right and small left pleural effusions.  2.  Near-complete collapse and compressive atelectasis of the right  lower lobe.  3.  Cardiomegaly with multichamber enlargement.  4.  Coronary artery calcifications.  5.  No evidence for acute fracture or traumatic subluxation of the  thoracic or lumbar spine.  6.  Multilevel discogenic degenerative changes with mild scoliosis of  the lumbar spine.  7.  Trace peritoneal ascites.      Past Medical History:  She has a past medical history of Deep vein thrombosis (DVT) of proximal vein of left lower extremity (09/18/2020) and Schizophrenia (11/01/2013).    Past Surgical History:  She has no past surgical history on file.      Social History:  She reports that she has never smoked. She has never used smokeless tobacco. No history on file for alcohol use and drug use.    Family History:  Family History[1]     Allergies:  Procaine hcl and Procaine    Inpatient Medications:  Scheduled Medications[2]  PRN Medications[3]  Continuous Medications[4]  Outpatient Medications:  Current Outpatient " Medications   Medication Instructions    acetaminophen (TYLENOL) 650 mg, oral, Every 4 hours PRN    alum-mag hydroxide-simeth (Mylanta) 200-200-20 mg/5 mL oral suspension 15 mL, oral, 2 times daily PRN    aspirin 81 mg EC tablet (1) TABLET BY MOUTH ONCE DAILY    benzonatate (TESSALON) 100 mg, oral, 3 times daily PRN, Do not crush or chew.    bisacodyl (ONELAX BISACODYL) 10 mg, rectal, Daily PRN    diphenhydrAMINE (BENADRYL) 25 mg, oral, Every 6 hours PRN    donepezil (ARICEPT) 5 mg, oral, Nightly    Eliquis 2.5 mg, oral, 2 times daily    FLUoxetine (PROzac) 20 mg capsule (1) CAPSULE BY MOUTH EVERY MORNING FOR ANXIETY / DEPRESSION    FLUoxetine (PROZAC) 10 mg, oral, Every morning, Take with 20mg to equal 30mg    furosemide (Lasix) 20 mg tablet (1) TABLET BY MOUTH ONCE DAILY    hydrocortisone 1 % cream 1 Application, Topical, 4 times daily PRN    ibuprofen 400 mg, oral, Every 4 hours PRN    loperamide (IMODIUM) 2 mg, oral, Daily PRN    loratadine (CLARITIN) 10 mg, oral, Daily PRN    losartan (Cozaar) 100 mg tablet (1) TABLET BY MOUTH ONCE DAILY    magnesium hydroxide (Milk of Magnesia) 400 mg/5 mL suspension 30 mL, oral, Daily PRN    metoprolol succinate XL (Toprol-XL) 50 mg 24 hr tablet (1) TABLET BY MOUTH ONCE DAILY    OLANZapine (ZyPREXA) 10 mg tablet (1) TABLET BY MOUTH AT BEDTIME PSYCHOSIS    omeprazole (PRILOSEC) 40 mg, oral, Daily    sennosides (Senokot) 8.6 mg tablet 2 tablets, oral, 2 times daily PRN    sodium phosphates (Fleet Enema) 19-7 gram/118 mL enema enema 1 enema, rectal, Daily PRN       Physical Exam:  Physical Exam  Vitals reviewed.   HENT:      Head: Normocephalic.      Nose: Nose normal.   Eyes:      Pupils: Pupils are equal, round, and reactive to light.   Cardiovascular:      Rate and Rhythm: Normal rate and regular rhythm.   Pulmonary:      Effort: Pulmonary effort is normal.      Breath sounds: Rhonchi heard in the bases   Abdominal:      General: Abdomen is distended tender palpitation       Palpations: Abdomen is soft.   Musculoskeletal:         General: Normal range of motion.      Cervical back: Normal range of motion.   Skin:     General: Skin is warm and dry.   Neurological:      General: No focal deficit present.      Mental Status: He is alert and oriented to person, place, and time.   Psychiatric:         Mood and Affect: Mood normal.    Extremities +1 pedal edema bilaterally         Assessment/Plan   Stella Agarwal is a very pleasant 74 year old female with a history of DVT (Eliquis), vascular dementia, multiple falls, HTN and GERD, presents to the ER after a syncopal event. The syncope was not witnessed. Patient is a poor historian. This morning denies pain, unsure why she is in the hospital. Noted in the chart she was walking in her room at the nursing home with her walker and slipped. Her walker hit her in the face and fell backwards hitting her head on the floor. She denies any pain. Denies chest pain, heart palpitations or dizziness. Her daughter has noticed she has had shortness of breath for the past three days. The daughter states she has had multiple falls over the past three months. The daughter states she has a cardiologist at [facility].     Heart failure  -large right and small left pleural effusion on CT scan   -continue to diuresis with IV Lasix   -monitor I&O   -consult pulmonary for possible thoracentesis   -continue to hold the Eliquis   -echocardiogram     Syncope   Elevated troponin   -elevated troponin secondary to demand ischemia   -continuous heart monitor     DVT  -continue Aspirin   -currently holding Eliquis     HTN   -continue Losartan       Peripheral IV 20 G Anterior;Right Forearm (Active)   Site Assessment Clean;Dry;Intact 07/11/25 2045   Dressing Status Clean;Dry 07/11/25 2045   Number of days:        Code Status:  Full Code    I spent  minutes in the professional and overall care of this patient.        Dory Lundy, APRN-CNP       [1] No family history on  file.  [2]   Scheduled medications   Medication Dose Route Frequency    [Held by provider] apixaban  2.5 mg oral BID    aspirin  81 mg oral Daily    [Held by provider] donepezil  5 mg oral Nightly    enoxaparin  40 mg subcutaneous q24h    FLUoxetine  30 mg oral Daily    furosemide  40 mg intravenous Daily    losartan  100 mg oral Daily    OLANZapine  10 mg oral Nightly    oxygen   inhalation Continuous - Inhalation    pantoprazole  40 mg oral Daily before breakfast    perflutren lipid microspheres  0.5-10 mL of dilution intravenous Once in imaging    polyethylene glycol  17 g oral Daily   [3]   PRN medications   Medication    levalbuterol   [4]   Continuous Medications   Medication Dose Last Rate

## 2025-07-12 NOTE — PROCEDURES
General    Date/Time: 7/12/2025 1:42 PM    Performed by: Laurent Augustin MD  Authorized by: Mustapha Paz MD    Consent:     Consent obtained:  Written    Consent given by:  Guardian    Risks, benefits, and alternatives were discussed: yes      Risks discussed:  Bleeding, infection, pain and nerve damage (pneumothorax)    Alternatives discussed:  No treatment  Universal protocol:     Procedure explained and questions answered to patient or proxy's satisfaction: yes      Relevant documents present and verified: yes      Test results available: yes      Imaging studies available: yes      Patient identity confirmed:  Verbally with patient and arm band  Pre-procedure details:     Skin preparation:  Chlorhexidine  Sedation:     Sedation type:  None  Anesthesia:     Anesthesia method:  Local infiltration    Local anesthetic:  Lidocaine 1% w/o epi  Procedure specific details:      Thoracentesis Procedure Report  [facility]  Location: 233    Indication:   Pleural effusion, right    Proceduralist:   Laurent Augustin MD  First Assistant: Dr. Brand      Local Anesthesia:   The proposed entry site was infiltrated with 10 mL of 1% lidocaine.     Thoracentesis:   After obtaining informed consent, the patient was placed in a seated position.  Using  Ultrasound guidance imaging showed: simple fluid collection.  The best entry site was marked.  The area was prepped and draped in the usual sterile fashion.  Fluid was aspirated using an 25 gauge finder needle.  A thoracentesis catheter was inserted into the pleural space.  700 mL of serous fluid was removed using vacuum bottle.  The fluid was sent for analysis, including: cell count and differential, LDH, total protein, glucose, bacterial culture, fungal smear and culture, AFB smear and culture, cytology.     Thoracentesis Procedure Termination:   The procedure was terminated due to no further drainage.   The catheter was then removed from the chest, and the site was  covered with an occlusive dressing.    Impression:  right pleural effusion  right thoracentesis performed, as described above    Recommendation:   Await culture, cytology, histopathology results.       Post-procedure details:     Procedure completion:  Tolerated

## 2025-07-12 NOTE — CONSULTS
Inpatient consult to Pulmonology  Consult performed by: Dieudonne Brand MD  Consult ordered by: Mustapha Paz MD        Department of Medicine  Division of Pulmonary, Critical Care, and Sleep Medicine      History Of Present Illness  Stella Agarwal is a 74 y.o. female with history of DVT on Eliquis, hypertension, recurrent falls admitted post fall/syncope, CT chest showed large right pleural effusion.  Patient on 3 L nasal cannula today, did report mild dyspnea but no productive cough, no fever or chills, no hemoptysis.  He did report some anterior reproducible chest pain, she said she fell and hit her head, but does not recall if she hit her chest.  All other systems reviewed and negative otherwise.    Quit smoking 30 years ago.       Medical History[1]    Surgical History[2]     Social History[3]    Family History[4]        Allergies  Procaine hcl and Procaine    Review of Systems       Physical exam  Constitutional: Normal appearance.  HEENT: Normocephalic and atraumatic.  Cardiovascular: Normal rate and regular rhythm.  Pulmonary: Normal respiratory effort, diminished right basilar sounds.  Musculoskeletal: No edema, no cyanosis.  Neurological: Awake, alert and oriented x3.  Psychiatric: Normal behavior, mood and affect.     Vital Signs  Visit Vitals  /59 (BP Location: Left arm)   Pulse (!) 114   Temp 36.5 °C (97.7 °F) (Temporal)   Resp 16   Ht (!) 1.524 m (5')   Wt 84.5 kg (186 lb 4.6 oz)   SpO2 97%   BMI 36.38 kg/m²   Smoking Status Never   BSA 1.89 m²      Lab Results   Component Value Date    WBC 9.0 07/12/2025    HGB 11.3 (L) 07/12/2025    HCT 37.7 07/12/2025    MCV 95 07/12/2025     07/12/2025      Lab Results   Component Value Date    GLUCOSE 97 07/12/2025    CALCIUM 8.2 (L) 07/12/2025     07/12/2025    K 3.9 07/12/2025    CO2 36 (H) 07/12/2025    CL 98 07/12/2025    BUN 15 07/12/2025    CREATININE 0.89 07/12/2025      Lab Results   Component Value Date    ALT 7 07/11/2025    AST 19  07/11/2025    ALKPHOS 60 07/11/2025    BILITOT 0.9 07/11/2025        Oxygen Therapy  SpO2: 97 %  Medical Gas Therapy: Supplemental oxygen  Medical Gas Delivery Method: Nasal cannula       Medications   Scheduled medications  Scheduled Medications[5]  Continuous medications  Continuous Medications[6]  PRN medications  PRN Medications[7]     Chest Radiograph   CT chest abdomen pelvis w IV contrast 07/11/2025    Narrative  STUDY:  CT Chest, Abdomen, and Pelvis with IV Contrast, CT Thoracic Spine and  Lumbar Spine without IV Contrast; 7/11/2025 10:53 AM  INDICATION:  Hypoxia.  Back pain.  Additional History:  Dementia.  COMPARISON:  None Available.  ACCESSION NUMBER(S):  [id number], [id number], [id number]  ORDERING CLINICIAN:  DANYEL TERRAZAS  TECHNIQUE:  CT of the chest, abdomen, and pelvis was performed.  Contiguous axial  images were obtained at 3 mm slice thickness through the chest,  abdomen, and pelvis.  Coronal and sagittal reconstructions at 3 mm  slice thickness were performed.  Omnipaque 350 100 mL was administered  intravenously.  Please note that spinal images were generated from the  original CT abdomen and pelvis imaging.  FINDINGS:  CHEST:  MEDIASTINUM:  The heart is enlarged with multichamber enlargement.  There is no  evidence for pericardial effusion.  Coronary artery calcifications are  present.  Central vascular structures opacify normally.  In the  retrocardiac region, there is a small sliding hiatal hernia.  LUNGS/PLEURA:  Large right and small left pleural effusions layer dependently in the  bilateral hemithoraces.  There is no pleural thickening or  pneumothorax.  The airways are patent.  Near-complete collapse and compressive atelectasis identified at the  right lower lobe.  Subsegmental atelectasis identified at the right  middle lobe and left lower lobe.  The lungs are otherwise clear  without consolidation, interstitial disease, or suspicious nodules.  LYMPH NODES:  Thoracic lymph nodes  are not enlarged.  ABDOMEN:    LIVER:  No hepatomegaly.  Smooth surface contour.  Normal attenuation.    BILE DUCTS:  No intrahepatic or extrahepatic biliary ductal dilatation.    GALLBLADDER:  The gallbladder is remarkable.  STOMACH:  No abnormalities identified.    PANCREAS:  No masses or ductal dilatation.    SPLEEN:  No splenomegaly or focal splenic lesion.    ADRENAL GLANDS:  No thickening or nodules.    KIDNEYS AND URETERS:  Kidneys are normal in size and location.  No renal or ureteral  calculi.    PELVIS:    BLADDER:  No abnormalities identified.    REPRODUCTIVE ORGANS:  No abnormalities identified.    BOWEL:  No abnormalities identified.  Appendix is well-visualized and is  within normal limits.    VESSELS:  No abnormalities identified.  Abdominal aorta is normal in caliber.    PERITONEUM/RETROPERITONEUM/LYMPH NODES:  Trace free fluid in the pelvis represents peritoneal ascites.  No  pneumoperitoneum.  No lymphadenopathy.    ABDOMINAL WALL:  No abnormalities identified.  SOFT TISSUES:  No abnormalities identified.    BONES:  No acute fracture or aggressive osseous lesion.  THORACIC SPINE:  The alignment is anatomic.  There is no fracture or traumatic  subluxation.  The vertebral body heights are well maintained.  Disc spaces are  preserved.  No significant central canal stenosis is demonstrated.  The neural foramina are patent throughout.  The paravertebral soft tissues are within normal limits.  LUMBAR SPINE:  Rightward convexity of the lumbar spine represents dextroscoliosis.  There is no fracture or traumatic subluxation.  The vertebral body heights are well maintained.  Intervertebral disc  narrowing with vacuum disc phenomenon extends from T12-L1 through  L5-S1.  Moderate right and mild left neural foraminal stenosis  identified at L3-L4, L4-L5 and L5-S1.  The paravertebral soft tissues are within normal limits.    Impression  1.  Large right and small left pleural effusions.  2.  Near-complete  "collapse and compressive atelectasis of the right  lower lobe.  3.  Cardiomegaly with multichamber enlargement.  4.  Coronary artery calcifications.  5.  No evidence for acute fracture or traumatic subluxation of the  thoracic or lumbar spine.  6.  Multilevel discogenic degenerative changes with mild scoliosis of  the lumbar spine.  7.  Trace peritoneal ascites.  Signed by Gabino Chavez MD      No results found for this or any previous visit from the past 10 days.         Pulmonary Function Tests   Pulmonary Functions Testing Results:  No results found for: \"FEV1\", \"FVC\", \"ZWB2CUB\", \"TLC\", \"DLCO\"         Assessment and Plan / Recommendations   Assessment/Plan   74 y.o. female with history of DVT on Eliquis, hypertension, recurrent falls admitted post fall/syncope, CT chest showed large right pleural effusion    1.  Acute hypoxic respiratory failure likely due to pleural effusion  2.  Large right pleural effusion, no previous imaging to compare.  ?Hemothorax - history of frequent falls on blood thinners  - Plan for thoracentesis today  - Encourage incentive spirometry, wean O2 as tolerated  - Can resume Eliquis after Thora if no complications          This dictation was created using voice recognition software. Phonetic and/or minor grammatical errors may exist.             Dieudonne Brand MD         [1]   Past Medical History:  Diagnosis Date    Deep vein thrombosis (DVT) of proximal vein of left lower extremity 09/18/2020    Schizophrenia 11/01/2013   [2] History reviewed. No pertinent surgical history.  [3]   Social History  Tobacco Use    Smoking status: Never    Smokeless tobacco: Never   [4] No family history on file.  [5] [Held by provider] apixaban, 2.5 mg, oral, BID  aspirin, 81 mg, oral, Daily  [Held by provider] donepezil, 5 mg, oral, Nightly  enoxaparin, 40 mg, subcutaneous, q24h  FLUoxetine, 30 mg, oral, Daily  furosemide, 40 mg, intravenous, Daily  losartan, 100 mg, oral, Daily  OLANZapine, 10 mg, " oral, Nightly  oxygen, , inhalation, Continuous - Inhalation  pantoprazole, 40 mg, oral, Daily before breakfast  perflutren lipid microspheres, 0.5-10 mL of dilution, intravenous, Once in imaging  polyethylene glycol, 17 g, oral, Daily  [6]    [7] PRN medications: levalbuterol

## 2025-07-12 NOTE — PROGRESS NOTES
Occupational Therapy    Evaluation    Patient Name: Stella Agarwal  MRN: [id number]  Department: [address]  Room: [address] [place] [address]  Today's Date: 7/12/2025  Time Calculation  Start Time: 0837  Stop Time: 0851  Time Calculation (min): 14 min    Assessment  IP OT Assessment  OT Assessment: Pt presents with decreased endurance/activity tolerance impacting ADL performance and functional mobility. Reccomend low intensity OT services to maximize pt safety and independence after discharge.  Prognosis: Good  Barriers to Discharge Home: No anticipated barriers  Evaluation/Treatment Tolerance: Patient tolerated treatment well  Medical Staff Made Aware: Yes  End of Session Communication: Bedside nurse  End of Session Patient Position: Up in chair, Alarm on  Plan:  Treatment Interventions: ADL retraining, Functional transfer training, Endurance training  OT Frequency: 2 times per week  OT Discharge Recommendations: Low intensity level of continued care  Equipment Recommended upon Discharge: Wheeled walker (owns)  OT Recommended Transfer Status: Stand by assist  OT - OK to Discharge: Yes (per OT POC)    Subjective   Current Problem:  1. Acute systolic heart failure  Transthoracic Echo Complete    Transthoracic Echo Complete      2. Fall, initial encounter        3. Acute respiratory failure with hypoxia        4. Hypervolemia, unspecified hypervolemia type        5. Bradycardia  Transthoracic Echo Complete    Transthoracic Echo Complete      6. Pleural effusion        7. Troponin level elevated          OT Visit Info:  OT Received On: 07/12/25  General Visit Info:  General  Reason for Referral: 73 yo female referred to OT for impaired ADLs/mobility  Referred By: Rosalio Pruett DO  Past Medical History Relevant to Rehab: DVT on life-long eliquis, vascular dementia, multiple falls, HTN, and GERD  Prior to Session Communication: Bedside nurse  Patient Position Received: Bed, 3 rail up, Alarm on  General Comment: Pt pleasant, mildly confused,  cooperative with OT evaluation  Precautions:  Medical Precautions: Fall precautions (tele, purewick)     Date/Time Vitals Session Patient Position Pulse Resp SpO2 BP MAP (mmHg)    07/12/25 0742 --  --  42  --  94 %  --  --                Pain:  Pain Assessment  Pain Assessment: 0-10  0-10 (Numeric) Pain Score: 0 - No pain    Objective   Cognition:  Overall Cognitive Status: Impaired at baseline  Arousal/Alertness: Appropriate responses to stimuli  Orientation Level: Disoriented to situation           Home Living:  Type of Home: Assisted living ([facility])  Home Adaptive Equipment:  (rollator)  Home Layout: One level  Home Access: No concerns  Bathroom Accessibility: Handicap accessible   Prior Function:  Level of [place]: Needs assistance with ADLs, Needs assistance with homemaking  Receives Help From:  (facility staff)  ADL Assistance: Needs assistance (SBA/supervision only)  Homemaking Assistance: Needs assistance (staff assists)  Ambulatory Assistance: Independent (with rollator)    ADL:  Eating Assistance: Independent  Grooming Assistance: Stand by  Bathing Assistance: Minimal  UE Dressing Assistance: Independent  LE Dressing Assistance: Stand by  Toileting Assistance with Device: Minimal  Functional Assistance: Minimal  ADL Comments: Able to don bilateral socks using figure 4 position without assistance. Other ADLs anticipated  Activity Tolerance:  Endurance: Tolerates 10 - 20 min exercise with multiple rests  Bed Mobility/Transfers: Bed Mobility  Bed Mobility: Yes  Bed Mobility 1  Bed Mobility 1: Supine to sitting  Level of Assistance 1: Close supervision  Bed Mobility Comments 1: HOB slightly elevated, increased time    Transfers  Transfer: Yes  Transfer 1  Transfer From 1: Sit to  Transfer to 1: Stand  Technique 1: Sit to stand  Transfer Device 1: Walker  Transfer Level of Assistance 1: Contact guard  Trials/Comments 1: from low bed, increased exertion  Transfers 2  Transfer From 2: Stand  to  Transfer to 2: Sit  Technique 2: Stand to sit  Transfer Device 2: Walker  Transfer Level of Assistance 2: Minimum assistance  Trials/Comments 2: Poor safety awareness, requiring assist for safe controlled descent      Functional Mobility:  Functional Mobility  Functional Mobility Performed: Yes  Functional Mobility 1  Surface 1: Level tile  Device 1: Rolling walker  Assistance 1: Contact guard  Comments 1: Sidestepped from bed to chair with cues for hand placement, general safety  Sitting Balance:  Static Sitting Balance  Static Sitting-Balance Support: Feet supported  Static Sitting-Level of Assistance: Distant supervision  Standing Balance:  Static Standing Balance  Static Standing-Balance Support: Bilateral upper extremity supported  Static Standing-Level of Assistance: Close supervision    Strength:  Strength Comments: BUE grossly 4+/5  Perception:  Inattention/Neglect: Appears intact  Coordination:  Movements are Fluid and Coordinated: Yes   Hand Function:  Hand Function  Gross Grasp: Functional  Coordination: Functional  Extremities: RUE   RUE : Within Functional Limits and LUE   LUE: Within Functional Limits    Outcome Measures: [facility] Daily Activity  Putting on and taking off regular lower body clothing: A little  Bathing (including washing, rinsing, drying): A little  Putting on and taking off regular upper body clothing: None  Toileting, which includes using toilet, bedpan or urinal: A little  Taking care of personal grooming such as brushing teeth: A little  Eating Meals: None  Daily Activity - Total Score: 20      Education Documentation  Body Mechanics, taught by Justin Rosenberg OT at 7/12/2025  9:30 AM.  Learner: Patient  Readiness: Acceptance  Method: Explanation  Response: Verbalizes Understanding    Precautions, taught by Justin Rosenberg OT at 7/12/2025  9:30 AM.  Learner: Patient  Readiness: Acceptance  Method: Explanation  Response: Verbalizes Understanding    ADL Training, taught by  Justin Rosenberg, OT at 7/12/2025  9:30 AM.  Learner: Patient  Readiness: Acceptance  Method: Explanation  Response: Verbalizes Understanding    Education Comments  No comments found.      Goals:   Encounter Problems       Encounter Problems (Active)       OT Goals       Pt will tolerate 10min stand during functional task completion with no more than 1 rest break in order to increase endurance for functional task completion.        Start:  07/12/25    Expected End:  07/26/25            Pt will complete [id number]-fb-[id number] transfers using LRD in preparation for ADLs with SBA        Start:  07/12/25    Expected End:  07/26/25            Pt will increase endurance to tolerate 15min of OOB activity with no more than 1 rest break in order to increase ability to engage in ADL completion.        Start:  07/12/25    Expected End:  07/26/25            Pt will demo LE ADL completion with supervision, using AE if needed.        Start:  07/12/25    Expected End:  07/26/25            Pt will complete ileana hygiene and clothing management during toileting ADL with SBA, using DME and adaptive strategies as neccesary        Start:  07/12/25    Expected End:  07/26/25

## 2025-07-12 NOTE — HOSPITAL COURSE
Brief HPI: Stella Agarwal is a 74 y.o. female with a PMH of DVT on life-long eliquis, vascular dementia, multiple falls, HTN, and GERD who presented to [facility] on 7/11/2025 for syncope. Of note there were no witnesses of event and patient is unreliable historian. Patient states she was walking in her room at nursing home with walker when she slipped. Her walker hit her in the face and she fell backwards, hitting the back of her head on the floor. She then lost consciousness for what she estimates to be a few minutes. When she awoke, she states she had no confusion. She endorses pain in her left leg which she hit during her fall, but denies any pain in her head, neck, or anywhere else. She denies any chest pain, abdominal pain, confusion, weakness, numbness, constipation, or diarrhea. She also denies any dizziness or precipitating symptoms before her fall.     ED Course: Vitals: T 98.2, /97, HR 46, RR 18, SpO2 84% on RA (resolved with 2 L NC)     Labs: CMP and CBC unremarkable, BNP 1206, trop 16, UA unremarkable     Imaging:    EKG showed sinus bradycardia with single PVC, LAD, low voltage QRS     CTCAP showed large right and small left pleural effusions, near complete collapse of the right lower lobe, cardiomegaly, and trace ascites     CT head and cervical/thoracic/lumbar spine unremarkable  Interventions: 2 L NC, lasix 40 IV    Floor:  Metoprolol was held and patient was diuresed. Pulmonology was consulted and thoracentesis performed on 7/12. Cardio was consulted given concern of syncope with bradycardia. Telemetry showed episodes of tachy-bradycardia.    with  cardiologist for HFpEF.

## 2025-07-12 NOTE — CARE PLAN
The patient's goals for the shift include      The clinical goals for the shift include Remain safe throughout shift    Problem: Safety - Adult  Goal: Free from fall injury  Outcome: Progressing

## 2025-07-12 NOTE — CARE PLAN
The patient's goals for the shift include    Problem: Heart Failure  Goal: Improved urinary output this shift  Outcome: Progressing  Flowsheets (Taken 7/12/2025 1253)  Improved urinary output this shift: Med administration/monitor effect  Goal: Reduction in peripheral edema within 24 hours  Outcome: Progressing  Flowsheets (Taken 7/12/2025 1253)  Reduction in peripheral edema within 24 hours: Monitor edema/skin/mucous membrane integrity  Goal: Report improvement of dyspnea/breathlessness this shift  Outcome: Progressing  Flowsheets (Taken 7/12/2025 1253)  Report improvement of dyspnea/breathlessness this shift: Encourage activity/mobility/ROM  Goal: Weight from fluid excess reduced over 2-3 days, then stabilize  Outcome: Progressing  Flowsheets (Taken 7/12/2025 1253)  Weight from fluid excess reduced over 2-3 days, then stabilize: Med administration/monitor effect  Goal: Increase self care and/or family involvement in 24 hours  Outcome: Progressing  Flowsheets (Taken 7/12/2025 1253)  Increase self care and/or family involvement in 24 hours: Assess for signs/symptoms of depression     Problem: Pain - Adult  Goal: Verbalizes/displays adequate comfort level or baseline comfort level  Outcome: Progressing  Flowsheets (Taken 7/12/2025 1253)  Verbalizes/displays adequate comfort level or baseline comfort level: Encourage patient to monitor pain and request assistance     Problem: Safety - Adult  Goal: Free from fall injury  Outcome: Progressing  Flowsheets (Taken 7/12/2025 1253)  Free from fall injury: Instruct family/caregiver on patient safety     Problem: Discharge Planning  Goal: Discharge to home or other facility with appropriate resources  Outcome: Progressing  Flowsheets (Taken 7/12/2025 1253)  Discharge to home or other facility with appropriate resources: Identify barriers to discharge with patient and caregiver     Problem: Chronic Conditions and Co-morbidities  Goal: Patient's chronic conditions and  co-morbidity symptoms are monitored and maintained or improved  Outcome: Progressing  Flowsheets (Taken 7/12/2025 1253)  Care Plan - Patient's Chronic Conditions and Co-Morbidity Symptoms are Monitored and Maintained or Improved: Monitor and assess patient's chronic conditions and comorbid symptoms for stability, deterioration, or improvement     Problem: Nutrition  Goal: Nutrient intake appropriate for maintaining nutritional needs  Outcome: Progressing     Problem: Skin  Goal: Promote skin healing  Flowsheets (Taken 7/12/2025 1253)  Promote skin healing: Turn/reposition every 2 hours/use positioning/transfer devices  Note: Pt to sit up in chair for 2 hours this shift

## 2025-07-13 VITALS
WEIGHT: 184.4 LBS | RESPIRATION RATE: 18 BRPM | TEMPERATURE: 97.9 F | HEIGHT: 60 IN | SYSTOLIC BLOOD PRESSURE: 109 MMHG | BODY MASS INDEX: 36.2 KG/M2 | DIASTOLIC BLOOD PRESSURE: 51 MMHG | OXYGEN SATURATION: 98 % | HEART RATE: 54 BPM

## 2025-07-13 LAB
ACID FAST STN SPEC: NORMAL
ALBUMIN SERPL BCP-MCNC: 3.2 G/DL (ref 3.4–5)
ANION GAP SERPL CALC-SCNC: 11 MMOL/L (ref 10–20)
BUN SERPL-MCNC: 17 MG/DL (ref 6–23)
CALCIUM SERPL-MCNC: 8 MG/DL (ref 8.6–10.3)
CHLORIDE SERPL-SCNC: 97 MMOL/L (ref 98–107)
CO2 SERPL-SCNC: 37 MMOL/L (ref 21–32)
CREAT SERPL-MCNC: 1.11 MG/DL (ref 0.5–1.05)
EGFRCR SERPLBLD CKD-EPI 2021: 53 ML/MIN/1.73M*2
ERYTHROCYTE [DISTWIDTH] IN BLOOD BY AUTOMATED COUNT: 15.1 % (ref 11.5–14.5)
FUNGUS SPEC FUNGUS STN: NORMAL
GLUCOSE SERPL-MCNC: 96 MG/DL (ref 74–99)
HCT VFR BLD AUTO: 36.5 % (ref 36–46)
HGB BLD-MCNC: 10.7 G/DL (ref 12–16)
MAGNESIUM SERPL-MCNC: 1.75 MG/DL (ref 1.6–2.4)
MCH RBC QN AUTO: 28.2 PG (ref 26–34)
MCHC RBC AUTO-ENTMCNC: 29.3 G/DL (ref 32–36)
MCV RBC AUTO: 96 FL (ref 80–100)
NRBC BLD-RTO: 0 /100 WBCS (ref 0–0)
PHOSPHATE SERPL-MCNC: 4.3 MG/DL (ref 2.5–4.9)
PLATELET # BLD AUTO: 185 X10*3/UL (ref 150–450)
POTASSIUM SERPL-SCNC: 3.8 MMOL/L (ref 3.5–5.3)
RBC # BLD AUTO: 3.8 X10*6/UL (ref 4–5.2)
SODIUM SERPL-SCNC: 141 MMOL/L (ref 136–145)
WBC # BLD AUTO: 8.4 X10*3/UL (ref 4.4–11.3)

## 2025-07-13 PROCEDURE — 83735 ASSAY OF MAGNESIUM: CPT

## 2025-07-13 PROCEDURE — 2500000005 HC RX 250 GENERAL PHARMACY W/O HCPCS: Performed by: STUDENT IN AN ORGANIZED HEALTH CARE EDUCATION/TRAINING PROGRAM

## 2025-07-13 PROCEDURE — 2500000004 HC RX 250 GENERAL PHARMACY W/ HCPCS (ALT 636 FOR OP/ED): Performed by: BEHAVIOR TECHNICIAN

## 2025-07-13 PROCEDURE — 80069 RENAL FUNCTION PANEL: CPT

## 2025-07-13 PROCEDURE — 1200000002 HC GENERAL ROOM WITH TELEMETRY DAILY

## 2025-07-13 PROCEDURE — 2500000002 HC RX 250 W HCPCS SELF ADMINISTERED DRUGS (ALT 637 FOR MEDICARE OP, ALT 636 FOR OP/ED)

## 2025-07-13 PROCEDURE — 99233 SBSQ HOSP IP/OBS HIGH 50: CPT | Performed by: BEHAVIOR TECHNICIAN

## 2025-07-13 PROCEDURE — 2500000002 HC RX 250 W HCPCS SELF ADMINISTERED DRUGS (ALT 637 FOR MEDICARE OP, ALT 636 FOR OP/ED): Performed by: BEHAVIOR TECHNICIAN

## 2025-07-13 PROCEDURE — 94667 MNPJ CHEST WALL 1ST: CPT

## 2025-07-13 PROCEDURE — 94760 N-INVAS EAR/PLS OXIMETRY 1: CPT

## 2025-07-13 PROCEDURE — 99233 SBSQ HOSP IP/OBS HIGH 50: CPT | Performed by: INTERNAL MEDICINE

## 2025-07-13 PROCEDURE — 36415 COLL VENOUS BLD VENIPUNCTURE: CPT

## 2025-07-13 PROCEDURE — 99233 SBSQ HOSP IP/OBS HIGH 50: CPT | Performed by: NURSE PRACTITIONER

## 2025-07-13 PROCEDURE — 85027 COMPLETE CBC AUTOMATED: CPT

## 2025-07-13 PROCEDURE — 2500000001 HC RX 250 WO HCPCS SELF ADMINISTERED DRUGS (ALT 637 FOR MEDICARE OP)

## 2025-07-13 PROCEDURE — 2500000004 HC RX 250 GENERAL PHARMACY W/ HCPCS (ALT 636 FOR OP/ED)

## 2025-07-13 PROCEDURE — 94668 MNPJ CHEST WALL SBSQ: CPT

## 2025-07-13 PROCEDURE — 2500000001 HC RX 250 WO HCPCS SELF ADMINISTERED DRUGS (ALT 637 FOR MEDICARE OP): Performed by: BEHAVIOR TECHNICIAN

## 2025-07-13 PROCEDURE — 97162 PT EVAL MOD COMPLEX 30 MIN: CPT | Mod: GP

## 2025-07-13 RX ORDER — LANOLIN ALCOHOL/MO/W.PET/CERES
400 CREAM (GRAM) TOPICAL DAILY
Status: DISCONTINUED | OUTPATIENT
Start: 2025-07-13 | End: 2025-07-23 | Stop reason: HOSPADM

## 2025-07-13 RX ORDER — POTASSIUM CHLORIDE 20 MEQ/1
20 TABLET, EXTENDED RELEASE ORAL ONCE
Status: COMPLETED | OUTPATIENT
Start: 2025-07-13 | End: 2025-07-13

## 2025-07-13 RX ORDER — NYSTATIN 100000 [USP'U]/G
1 POWDER TOPICAL 2 TIMES DAILY
Status: DISCONTINUED | OUTPATIENT
Start: 2025-07-13 | End: 2025-07-23 | Stop reason: HOSPADM

## 2025-07-13 RX ADMIN — POLYETHYLENE GLYCOL 3350 17 G: 17 POWDER, FOR SOLUTION ORAL at 08:27

## 2025-07-13 RX ADMIN — ENOXAPARIN SODIUM 40 MG: 100 INJECTION SUBCUTANEOUS at 15:13

## 2025-07-13 RX ADMIN — OLANZAPINE 10 MG: 5 TABLET, FILM COATED ORAL at 20:58

## 2025-07-13 RX ADMIN — Medication 1 TABLET: at 08:27

## 2025-07-13 RX ADMIN — SODIUM CHLORIDE 250 ML: 0.9 INJECTION, SOLUTION INTRAVENOUS at 04:37

## 2025-07-13 RX ADMIN — FLUOXETINE HYDROCHLORIDE 30 MG: 10 CAPSULE ORAL at 08:27

## 2025-07-13 RX ADMIN — Medication 3 L/MIN: at 08:26

## 2025-07-13 RX ADMIN — NYSTATIN 1 APPLICATION: 100000 POWDER TOPICAL at 13:09

## 2025-07-13 RX ADMIN — Medication 3 L/MIN: at 21:00

## 2025-07-13 RX ADMIN — ASPIRIN 81 MG: 81 TABLET, DELAYED RELEASE ORAL at 08:27

## 2025-07-13 RX ADMIN — NYSTATIN 1 APPLICATION: 100000 POWDER TOPICAL at 20:59

## 2025-07-13 RX ADMIN — PANTOPRAZOLE SODIUM 40 MG: 40 TABLET, DELAYED RELEASE ORAL at 08:27

## 2025-07-13 RX ADMIN — POTASSIUM CHLORIDE 20 MEQ: 20 TABLET, EXTENDED RELEASE ORAL at 08:27

## 2025-07-13 ASSESSMENT — COGNITIVE AND FUNCTIONAL STATUS - GENERAL
STANDING UP FROM CHAIR USING ARMS: A LITTLE
DAILY ACTIVITIY SCORE: 14
MOVING FROM LYING ON BACK TO SITTING ON SIDE OF FLAT BED WITH BEDRAILS: A LITTLE
CLIMB 3 TO 5 STEPS WITH RAILING: TOTAL
TOILETING: A LOT
CLIMB 3 TO 5 STEPS WITH RAILING: A LOT
MOVING FROM LYING ON BACK TO SITTING ON SIDE OF FLAT BED WITH BEDRAILS: A LITTLE
TURNING FROM BACK TO SIDE WHILE IN FLAT BAD: A LITTLE
STANDING UP FROM CHAIR USING ARMS: A LITTLE
DRESSING REGULAR UPPER BODY CLOTHING: A LOT
PERSONAL GROOMING: A LITTLE
TURNING FROM BACK TO SIDE WHILE IN FLAT BAD: A LITTLE
TURNING FROM BACK TO SIDE WHILE IN FLAT BAD: A LITTLE
DAILY ACTIVITIY SCORE: 16
EATING MEALS: A LITTLE
MOBILITY SCORE: 14
HELP NEEDED FOR BATHING: A LOT
WALKING IN HOSPITAL ROOM: A LOT
CLIMB 3 TO 5 STEPS WITH RAILING: A LOT
MOVING TO AND FROM BED TO CHAIR: A LOT
MOBILITY SCORE: 17
WALKING IN HOSPITAL ROOM: TOTAL
HELP NEEDED FOR BATHING: A LITTLE
WALKING IN HOSPITAL ROOM: A LITTLE
STANDING UP FROM CHAIR USING ARMS: A LOT
MOVING TO AND FROM BED TO CHAIR: A LITTLE
MOVING TO AND FROM BED TO CHAIR: A LITTLE
DRESSING REGULAR LOWER BODY CLOTHING: A LOT
EATING MEALS: A LITTLE
MOVING FROM LYING ON BACK TO SITTING ON SIDE OF FLAT BED WITH BEDRAILS: A LITTLE
TOILETING: A LITTLE
MOBILITY SCORE: 14
DRESSING REGULAR UPPER BODY CLOTHING: A LITTLE
DRESSING REGULAR LOWER BODY CLOTHING: A LOT
PERSONAL GROOMING: A LOT

## 2025-07-13 ASSESSMENT — PAIN SCALES - GENERAL
PAINLEVEL_OUTOF10: 0 - NO PAIN

## 2025-07-13 ASSESSMENT — PAIN - FUNCTIONAL ASSESSMENT
PAIN_FUNCTIONAL_ASSESSMENT: 0-10

## 2025-07-13 ASSESSMENT — ACTIVITIES OF DAILY LIVING (ADL): ADL_ASSISTANCE: NEEDS ASSISTANCE

## 2025-07-13 NOTE — CARE PLAN
The patient's goals for the shift include Pt to remain hemodynamically.  Problem: Heart Failure  Goal: Improved urinary output this shift  Outcome: Progressing  Goal: Reduction in peripheral edema within 24 hours  Outcome: Progressing  Goal: Report improvement of dyspnea/breathlessness this shift  Outcome: Progressing  Goal: Weight from fluid excess reduced over 2-3 days, then stabilize  Outcome: Progressing  Goal: Increase self care and/or family involvement in 24 hours  Outcome: Progressing     Problem: Pain - Adult  Goal: Verbalizes/displays adequate comfort level or baseline comfort level  Outcome: Progressing     Problem: Safety - Adult  Goal: Free from fall injury  Outcome: Progressing     Problem: Discharge Planning  Goal: Discharge to home or other facility with appropriate resources  Outcome: Progressing     Problem: Chronic Conditions and Co-morbidities  Goal: Patient's chronic conditions and co-morbidity symptoms are monitored and maintained or improved  Outcome: Progressing     Problem: Nutrition  Goal: Nutrient intake appropriate for maintaining nutritional needs  Outcome: Progressing     Problem: Skin  Goal: Participates in plan/prevention/treatment measures  Outcome: Progressing  Flowsheets (Taken 7/13/2025 1023)  Participates in plan/prevention/treatment measures:   Discuss with provider PT/OT consult   Elevate heels  Goal: Prevent/manage excess moisture  Outcome: Progressing  Flowsheets (Taken 7/13/2025 1023)  Prevent/manage excess moisture:   Cleanse incontinence/protect with barrier cream   Moisturize dry skin  Goal: Prevent/minimize sheer/friction injuries  Outcome: Progressing  Flowsheets (Taken 7/13/2025 1023)  Prevent/minimize sheer/friction injuries:   HOB 30 degrees or less   Turn/reposition every 2 hours/use positioning/transfer devices   Use pull sheet  Goal: Promote/optimize nutrition  Outcome: Progressing  Flowsheets (Taken 7/13/2025 1023)  Promote/optimize nutrition:   Assist with  feeding   Consume > 50% meals/supplements  Goal: Promote skin healing  Outcome: Progressing    stable this shift

## 2025-07-13 NOTE — PROGRESS NOTES
Department of Medicine  Division of Pulmonary, Critical Care, and Sleep Medicine    Stella Agarwal is a 74 y.o. female on day 2 of admission presenting with Acute systolic heart failure.    Subjective   Stable, no events overnight       Objective     Vital Signs      7/13/2025     4:10 AM 7/13/2025     5:10 AM 7/13/2025     5:43 AM 7/13/2025     6:12 AM 7/13/2025     6:40 AM 7/13/2025     7:48 AM 7/13/2025    10:30 AM   Vitals   Systolic 90  95  98 108    Diastolic 40  42  79 70    BP Location     Left arm Left arm    Heart Rate 46  46  44 50 111   Temp     36.5 °C (97.7 °F) 36.5 °C (97.7 °F)    Resp     22 17    Weight (lb)  182.9  184.4      BMI  35.72 kg/m2  36.01 kg/m2      BSA (m2)  1.87 m2  1.88 m2           Physical exam  Constitutional: Normal appearance, hard of hearing.  HEENT: Normocephalic and atraumatic.  Cardiovascular: Normal rate and regular rhythm.  Pulmonary: Normal respiratory effort, basilar crackles, no wheezing  Musculoskeletal: No edema, no cyanosis.  Neurological: Awake, alert and oriented x3.  Psychiatric: Normal behavior, mood and affect.    Labs:  Lab Results   Component Value Date    WBC 8.4 07/13/2025    HGB 10.7 (L) 07/13/2025    HCT 36.5 07/13/2025    MCV 96 07/13/2025     07/13/2025      Lab Results   Component Value Date    GLUCOSE 96 07/13/2025    CALCIUM 8.0 (L) 07/13/2025     07/13/2025    K 3.8 07/13/2025    CO2 37 (H) 07/13/2025    CL 97 (L) 07/13/2025    BUN 17 07/13/2025    CREATININE 1.11 (H) 07/13/2025      Lab Results   Component Value Date    ALT 7 07/11/2025    AST 19 07/11/2025    ALKPHOS 60 07/11/2025    BILITOT 0.9 07/11/2025        Oxygen Therapy  SpO2: 100 %  Medical Gas Therapy: Supplemental oxygen  Medical Gas Delivery Method: Nasal cannula       Intake/Output last 3 Shifts:  I/O last 3 completed shifts:  In: 812.2 (9.7 mL/kg) [P.O.:558; IV Piggyback:254.2]  Out: 1125 (13.5 mL/kg) [Urine:1125 (0.4 mL/kg/hr)]  Weight: 83.6 kg       Medications    Scheduled medications  Scheduled Medications[1]  Continuous medications  Continuous Medications[2]  PRN medications  PRN Medications[3]     Allergies  Procaine hcl and Procaine    Chest Radiograph   CT chest abdomen pelvis w IV contrast 07/11/2025    Narrative  STUDY:  CT Chest, Abdomen, and Pelvis with IV Contrast, CT Thoracic Spine and  Lumbar Spine without IV Contrast; 7/11/2025 10:53 AM  INDICATION:  Hypoxia.  Back pain.  Additional History:  Dementia.  COMPARISON:  None Available.  ACCESSION NUMBER(S):  [id number], [id number], [id number]  ORDERING CLINICIAN:  DANYEL TERRAZAS  TECHNIQUE:  CT of the chest, abdomen, and pelvis was performed.  Contiguous axial  images were obtained at 3 mm slice thickness through the chest,  abdomen, and pelvis.  Coronal and sagittal reconstructions at 3 mm  slice thickness were performed.  Omnipaque 350 100 mL was administered  intravenously.  Please note that spinal images were generated from the  original CT abdomen and pelvis imaging.  FINDINGS:  CHEST:  MEDIASTINUM:  The heart is enlarged with multichamber enlargement.  There is no  evidence for pericardial effusion.  Coronary artery calcifications are  present.  Central vascular structures opacify normally.  In the  retrocardiac region, there is a small sliding hiatal hernia.  LUNGS/PLEURA:  Large right and small left pleural effusions layer dependently in the  bilateral hemithoraces.  There is no pleural thickening or  pneumothorax.  The airways are patent.  Near-complete collapse and compressive atelectasis identified at the  right lower lobe.  Subsegmental atelectasis identified at the right  middle lobe and left lower lobe.  The lungs are otherwise clear  without consolidation, interstitial disease, or suspicious nodules.  LYMPH NODES:  Thoracic lymph nodes are not enlarged.  ABDOMEN:    LIVER:  No hepatomegaly.  Smooth surface contour.  Normal attenuation.    BILE DUCTS:  No intrahepatic or extrahepatic biliary  ductal dilatation.    GALLBLADDER:  The gallbladder is remarkable.  STOMACH:  No abnormalities identified.    PANCREAS:  No masses or ductal dilatation.    SPLEEN:  No splenomegaly or focal splenic lesion.    ADRENAL GLANDS:  No thickening or nodules.    KIDNEYS AND URETERS:  Kidneys are normal in size and location.  No renal or ureteral  calculi.    PELVIS:    BLADDER:  No abnormalities identified.    REPRODUCTIVE ORGANS:  No abnormalities identified.    BOWEL:  No abnormalities identified.  Appendix is well-visualized and is  within normal limits.    VESSELS:  No abnormalities identified.  Abdominal aorta is normal in caliber.    PERITONEUM/RETROPERITONEUM/LYMPH NODES:  Trace free fluid in the pelvis represents peritoneal ascites.  No  pneumoperitoneum.  No lymphadenopathy.    ABDOMINAL WALL:  No abnormalities identified.  SOFT TISSUES:  No abnormalities identified.    BONES:  No acute fracture or aggressive osseous lesion.  THORACIC SPINE:  The alignment is anatomic.  There is no fracture or traumatic  subluxation.  The vertebral body heights are well maintained.  Disc spaces are  preserved.  No significant central canal stenosis is demonstrated.  The neural foramina are patent throughout.  The paravertebral soft tissues are within normal limits.  LUMBAR SPINE:  Rightward convexity of the lumbar spine represents dextroscoliosis.  There is no fracture or traumatic subluxation.  The vertebral body heights are well maintained.  Intervertebral disc  narrowing with vacuum disc phenomenon extends from T12-L1 through  L5-S1.  Moderate right and mild left neural foraminal stenosis  identified at L3-L4, L4-L5 and L5-S1.  The paravertebral soft tissues are within normal limits.    Impression  1.  Large right and small left pleural effusions.  2.  Near-complete collapse and compressive atelectasis of the right  lower lobe.  3.  Cardiomegaly with multichamber enlargement.  4.  Coronary artery calcifications.  5.  No evidence  for acute fracture or traumatic subluxation of the  thoracic or lumbar spine.  6.  Multilevel discogenic degenerative changes with mild scoliosis of  the lumbar spine.  7.  Trace peritoneal ascites.  Signed by Gabino Chavez MD       XR chest 1 view 07/12/2025    Narrative  Interpreted By:  Jaleel Godinez,  STUDY:  XR CHEST 1 VIEW; 7/12/2025 2:09 pm    INDICATION:  Signs/Symptoms:rule outt pneumothorax s/p thora.    COMPARISON:  Chest CT scan 07/11/2025    ACCESSION NUMBER(S):  [id number]    ORDERING CLINICIAN:  DAVID JAMES    TECHNIQUE:  1 view of the chest was performed.    FINDINGS:  Perihilar vascular congestion. Trace bibasal atelectasis. Small  bilateral pleural effusion. No pneumothorax.  The cardiomediastinal  silhouette is enlarged.    Impression  1. Cardiomegaly with perihilar vascular congestion, small bilateral  pleural effusion and bibasal atelectasis.    Signed by: Jaleel Godinez 7/12/2025 3:48 PM  Dictation workstation:   [id number]         Assessment and Plan / Recommendations   Assessment/Plan     74 y.o. female with history of DVT on Eliquis, hypertension, recurrent falls admitted post fall/syncope, CT chest showed large right pleural effusion     1.  Acute hypoxic respiratory failure likely due to pleural effusion  2.  Large right pleural effusion, post thoracentesis 7/12, 700 mL removed.  Fluid is transudative.  Follow cytology and cultures.  - Resume diuresis when renal function stable, echo pending.  - Encourage incentive spirometry, wean O2 as tolerated, on 3 L nasal cannula with sat 100%-should be able to wean  - Okay to resume Eliquis from pulmonary standpoint            This dictation was created using voice recognition software. Phonetic and/or minor grammatical errors may exist.            Dieudonne Brand MD           [1] [Held by provider] apixaban, 2.5 mg, oral, BID  aspirin, 81 mg, oral, Daily  [Held by provider] donepezil, 5 mg, oral, Nightly  enoxaparin, 40 mg, subcutaneous,  q24h  FLUoxetine, 30 mg, oral, Daily  [Held by provider] furosemide, 40 mg, intravenous, Daily  [Held by provider] losartan, 100 mg, oral, Daily  magnesium oxide, 400 mg of magnesium oxide, oral, Daily  OLANZapine, 10 mg, oral, Nightly  oxygen, , inhalation, Continuous - Inhalation  pantoprazole, 40 mg, oral, Daily before breakfast  perflutren lipid microspheres, 0.5-10 mL of dilution, intravenous, Once in imaging  polyethylene glycol, 17 g, oral, Daily  [2]    [3] PRN medications: levalbuterol

## 2025-07-13 NOTE — PROGRESS NOTES
"Subjective Data:  Mrs. Agarwal is resting in bed. Denies chest pain. She had a thoracentesis yesterday 700 ml of fluid was removed. Heart rate bradycardic while sleeping.     Overnight Events:         Objective Data:  Last Recorded Vitals:  Vitals:    07/13/25 0543 07/13/25 0612 07/13/25 0640 07/13/25 0748   BP: (!) 95/42  98/79 108/70   BP Location:   Left arm Left arm   Pulse: (!) 46  (!) 44 50   Resp:   22 17   Temp:   36.5 °C (97.7 °F) 36.5 °C (97.7 °F)   TempSrc:   Temporal Temporal   SpO2: 98%  99% 99%   Weight:  83.6 kg (184 lb 6.4 oz)     Height:           Last Labs:  CBC - 7/13/2025:  6:08 AM  8.4 10.7 185    36.5      CMP - 7/13/2025:  6:08 AM  8.0 6.3 19 --- 0.9   4.3 3.2 7 60      PTT - No results in last year.  2.1   23.3 _     TROPHS   Date/Time Value Ref Range Status   07/12/2025 08:20 AM 16 0 - 13 ng/L Final   07/11/2025 04:49 PM 19 0 - 13 ng/L Final   07/11/2025 01:17 PM 18 0 - 13 ng/L Final     BNP   Date/Time Value Ref Range Status   07/11/2025 10:31 AM 1,206 0 - 99 pg/mL Final     HGBA1C   Date/Time Value Ref Range Status   09/04/2024 07:23 AM 5.5 see below % Final      Last I/O:  I/O last 3 completed shifts:  In: 812.2 (9.7 mL/kg) [P.O.:558; IV Piggyback:254.2]  Out: 1125 (13.5 mL/kg) [Urine:1125 (0.4 mL/kg/hr)]  Weight: 83.6 kg     Past Cardiology Tests (Last 3 Years):  EKG:  No results found for this or any previous visit from the past 1095 days.    Echo:  No results found for this or any previous visit from the past 1095 days.    Ejection Fractions:  No results found for: \"EF\"  Cath:  No results found for this or any previous visit from the past 1095 days.    Stress Test:  No results found for this or any previous visit from the past 1095 days.    Imaging:  Chest x-ray   1. Cardiomegaly with perihilar vascular congestion, small bilateral  pleural effusion and bibasal atelectasis.    CT chest abdomen   1.  Large right and small left pleural effusions.  2.  Near-complete collapse and compressive " atelectasis of the right  lower lobe.  3.  Cardiomegaly with multichamber enlargement.  4.  Coronary artery calcifications.  5.  No evidence for acute fracture or traumatic subluxation of the  thoracic or lumbar spine.  6.  Multilevel discogenic degenerative changes with mild scoliosis of  the lumbar spine.  7.  Trace peritoneal ascites.      Inpatient Medications:  Scheduled Medications[1]  PRN Medications[2]  Continuous Medications[3]    Physical Exam:  Physical Exam  Vitals reviewed.   HENT:      Head: Normocephalic.      Nose: Nose normal.   Eyes:      Pupils: Pupils are equal, round, and reactive to light.   Cardiovascular:      Rate and Rhythm: Normal rate and regular rhythm.   Pulmonary:      Effort: Pulmonary effort is normal.      Breath sounds rhonchi in the bases bilaterally   Abdominal:      General: Abdomen is flat.      Palpations: Abdomen is soft.   Musculoskeletal:         General: Normal range of motion.      Cervical back: Normal range of motion.   Skin:     General: Skin is warm and dry.   Neurological:      General: No focal deficit present.      Mental Status: He is alert and oriented to person, place, and time.   Psychiatric:         Mood and Affect: Mood normal.      Extremities trace pedal edema       Assessment/Plan   Stella Agarwal is a very pleasant 74 year old female with a history of DVT (Eliquis), vascular dementia, multiple falls, HTN and GERD, presents to the ER after a syncopal event. The syncope was not witnessed. Patient is a poor historian. This morning denies pain, unsure why she is in the hospital. Noted in the chart she was walking in her room at the nursing home with her walker and slipped. Her walker hit her in the face and fell backwards hitting her head on the floor. She denies any pain. Denies chest pain, heart palpitations or dizziness. Her daughter has noticed she has had shortness of breath for the past three days. The daughter states she has had multiple falls over the  past three months. The daughter states she has a cardiologist at [facility].      Heart failure  -large right and small left pleural effusion on CT scan thoracentesis yesterday 700 ml of fluid removed chest x-ray showed small bilateral pleural effusion  -hold Lasix to avoid hypotension   -monitor I&O   -continue to hold the Eliquis   -echocardiogram      Syncope   Elevated troponin   -elevated troponin secondary to demand ischemia   -continuous heart monitor      DVT  -continue Aspirin   -currently holding Eliquis      HTN   -Losartan held due to hypotension     Bradycardia   -asymptomatic   -continuous heart monitor      Peripheral IV 20 G Anterior;Right Forearm (Active)   Site Assessment Clean;Dry;Intact 07/12/25 2050   Dressing Status Clean;Dry;Occlusive 07/12/25 2050   Number of days:        Code Status:  Full Code    I spent  minutes in the professional and overall care of this patient.        Dory Lundy, APRN-CNP       [1]   Scheduled medications   Medication Dose Route Frequency    [Held by provider] apixaban  2.5 mg oral BID    aspirin  81 mg oral Daily    [Held by provider] donepezil  5 mg oral Nightly    enoxaparin  40 mg subcutaneous q24h    FLUoxetine  30 mg oral Daily    [Held by provider] furosemide  40 mg intravenous Daily    [Held by provider] losartan  100 mg oral Daily    magnesium oxide  400 mg of magnesium oxide oral Daily    OLANZapine  10 mg oral Nightly    oxygen   inhalation Continuous - Inhalation    pantoprazole  40 mg oral Daily before breakfast    perflutren lipid microspheres  0.5-10 mL of dilution intravenous Once in imaging    polyethylene glycol  17 g oral Daily    potassium chloride CR  20 mEq oral Once   [2]   PRN medications   Medication    levalbuterol   [3]   Continuous Medications   Medication Dose Last Rate

## 2025-07-13 NOTE — PROGRESS NOTES
Department of Hospital Medicine  Progress Note    Subjective     Stella Agarwal is a 74 y.o. female on Hospital Day: 3 of admission presenting with Acute systolic heart failure.    Overnight Events: Patient BP reported to be hypotensive in SBP 80's and SBP 90's. HR would fluctuate b/w tachycardia and bradycardia. Received 250 mL IVF for hypotension    On initial visit, patient had stated she was tired and denied SOB and chest pain. States she does not use the IS.          Objective   Vitals:  Vitals:    07/13/25 0748 07/13/25 1030 07/13/25 1043 07/13/25 1226   BP: 108/70   130/54   BP Location: Left arm      Pulse: 50 (!) 111 110 60   Resp: 17      Temp: 36.5 °C (97.7 °F)      TempSrc: Temporal      SpO2: 99% 100% 97% 96%   Weight:       Height:           Intake/Output last 3 Shifts:  I/O last 3 completed shifts:  In: 812.2 (9.7 mL/kg) [P.O.:558; IV Piggyback:254.2]  Out: 1125 (13.5 mL/kg) [Urine:1125 (0.4 mL/kg/hr)]  Weight: 83.6 kg   No intake/output data recorded.    Last stool output       Physical Exam:   Physical Exam  Constitutional:       Appearance: Normal appearance.   HENT:      Head:      Comments: Right chronic facial droop  Cardiovascular:      Rate and Rhythm: Regular rhythm. Bradycardia present.      Pulses: Normal pulses.      Heart sounds: Normal heart sounds.   Pulmonary:      Effort: Pulmonary effort is normal.      Breath sounds: Rhonchi present.   Abdominal:      General: There is distension.      Tenderness: There is abdominal tenderness.      Comments: (denies tenderness but seems to tense abdomen on palpation).    Musculoskeletal:      Right lower leg: Edema present.      Left lower leg: Edema present.      Comments: +1-2 up to mid shin   Neurological:      Mental Status: She is alert. She is disoriented.      Comments: AOx1         LABS:    CBC:  Results from last 72 hours   Lab Units 07/13/25  0608 07/12/25  0610 07/11/25  1031   WBC AUTO x10*3/uL 8.4 9.0 10.2   RBC AUTO x10*6/uL 3.80*  "3.98* 4.07   HEMOGLOBIN g/dL 10.7* 11.3* 11.8*   HEMATOCRIT % 36.5 37.7 38.6   MCV fL 96 95 95   MCH pg 28.2 28.4 29.0   MCHC g/dL 29.3* 30.0* 30.6*   RDW % 15.1* 14.9* 15.4*   PLATELETS AUTO x10*3/uL 185 207 212     CMP:  Results from last 72 hours   Lab Units 07/13/25  0608 07/12/25  0610 07/11/25  1031   SODIUM mmol/L 141 141 140   POTASSIUM mmol/L 3.8 3.9 4.3   CHLORIDE mmol/L 97* 98 98   CO2 mmol/L 37* 36* 33*   ANION GAP mmol/L 11 11 13   BUN mg/dL 17 15 19   CREATININE mg/dL 1.11* 0.89 0.99   EGFR mL/min/1.73m*2 53* 69 60*   GLUCOSE mg/dL 96 97 127*     Results from last 72 hours   Lab Units 07/13/25  0608 07/12/25  0610 07/11/25  1031   ALBUMIN g/dL 3.2* 3.2* 3.8   ALK PHOS U/L  --   --  60   ALT U/L  --   --  7   AST U/L  --   --  19   BILIRUBIN TOTAL mg/dL  --   --  0.9     Calcium/Phos:   Results from last 72 hours   Lab Units 07/13/25  0608 07/12/25  0610 07/11/25  1031   CALCIUM mg/dL 8.0* 8.2* 8.8   PHOSPHORUS mg/dL 4.3 4.4  --       COAG:   Results from last 72 hours   Lab Units 07/11/25  1156   INR  2.1*     CRP:   Lab Results   Component Value Date    CRP 0.41 01/12/2022       ENDO:  Recent Labs     09/04/24  0723   TSH 2.51   HGBA1C 5.5      CARDIAC:   Results from last 72 hours   Lab Units 07/12/25  0820 07/11/25  1649 07/11/25  1317 07/11/25  1031   LD U/L 226  --   --   --    TROPHS ng/L 16* 19* 18* 16*   BNP pg/mL  --   --   --  1,206*       No data recorded    MICRO/ID:   No results found for the last 90 days.             Results from last 7 days   Lab Units 07/12/25  1348   FUNGAL STAIN  No fungal elements seen      No results found for: \"URINECULTURE\", \"BLOODCULT\", \"CSFCULTSMEAR\"    IMAGING RESULTS:   XR chest 1 view  Result Date: 7/12/2025  1. Cardiomegaly with perihilar vascular congestion, small bilateral pleural effusion and bibasal atelectasis.   Signed by: Jaleel Godinez 7/12/2025 3:48 PM Dictation workstation:   [id number]      ALLERGIES PAST MEDICAL HISTORY PAST SURGICAL SURGERY " FAMILY HISTORY SOCIAL HISTORY   Allergies[1] Medical History[2] Surgical History[3] Family History[4] Social History[5]         MEDS:  HOME MEDS SCHEDULED MEDS PRN IV MEDS   Current Outpatient Medications   Medication Instructions    acetaminophen (TYLENOL) 650 mg, oral, Every 4 hours PRN    alum-mag hydroxide-simeth (Mylanta) 200-200-20 mg/5 mL oral suspension 15 mL, oral, 2 times daily PRN    aspirin 81 mg EC tablet (1) TABLET BY MOUTH ONCE DAILY    benzonatate (TESSALON) 100 mg, oral, 3 times daily PRN, Do not crush or chew.    bisacodyl (ONELAX BISACODYL) 10 mg, rectal, Daily PRN    diphenhydrAMINE (BENADRYL) 25 mg, oral, Every 6 hours PRN    donepezil (ARICEPT) 5 mg, oral, Nightly    Eliquis 2.5 mg, oral, 2 times daily    FLUoxetine (PROzac) 20 mg capsule (1) CAPSULE BY MOUTH EVERY MORNING FOR ANXIETY / DEPRESSION    FLUoxetine (PROZAC) 10 mg, oral, Every morning, Take with 20mg to equal 30mg    furosemide (Lasix) 20 mg tablet (1) TABLET BY MOUTH ONCE DAILY    hydrocortisone 1 % cream 1 Application, Topical, 4 times daily PRN    ibuprofen 400 mg, oral, Every 4 hours PRN    loperamide (IMODIUM) 2 mg, oral, Daily PRN    loratadine (CLARITIN) 10 mg, oral, Daily PRN    losartan (Cozaar) 100 mg tablet (1) TABLET BY MOUTH ONCE DAILY    magnesium hydroxide (Milk of Magnesia) 400 mg/5 mL suspension 30 mL, oral, Daily PRN    metoprolol succinate XL (Toprol-XL) 50 mg 24 hr tablet (1) TABLET BY MOUTH ONCE DAILY    OLANZapine (ZyPREXA) 10 mg tablet (1) TABLET BY MOUTH AT BEDTIME PSYCHOSIS    omeprazole (PRILOSEC) 40 mg, oral, Daily    sennosides (Senokot) 8.6 mg tablet 2 tablets, oral, 2 times daily PRN    sodium phosphates (Fleet Enema) 19-7 gram/118 mL enema enema 1 enema, rectal, Daily PRN    Scheduled Medications[6] PRN Medications[7] Continuous Medications[8]          Assessment/Plan   ASSESSMENT & PLAN  Stella Agarwal is a 74 y.o. female with a PMH of DVT on life-long eliquis, vascular dementia, multiple falls, HTN,  and GERD who presented to [facility] on 7/11/2025 for syncope after mechanical fall. In ED, patient noted to have significant bradycardia in 40s and found to have mildly elevated troponin with significantly elevated BNP and evidence of hypervolemia on exam and imaging. Patient, daughter, and nursing home deny any history of CHF. Admitted to medicine for diuresis iso of suspected CHF and pleural effusion.      #new onset acute CHF exacerbation  #large right and small left pleural effusion s/p thoracentesis  #Hypotension  - Given IV Lasix 40 mg in ED  - Thoracentesis done 7/12/25, CXR showed no PTX and hemothorax  - SBP ranging from 80-low 100's post thoracentesis  Plan:  - HOLD losartan and anti-HTN meds  - HOLD IV Lasix 40 mg for now   - Encourage IS and bronchial hygiene   - monitor I&Os        #Tachy-bradycardia  #syncope  #mechanical fall with head injury on anticoagulation  #multiple recent falls  - CT head and c/t/l spine showed no acute processes  - EKG sinus bradycardia w/o ischemic change  - currently asymptomatic  Plan:  - telemetry  - holding donepezil   - Pending TTE  - Cardiology following, appreciate recs  - Consider restarting Eliquis tomorrow, may require pacemaker placement.   - PT/OT consult      RESOLVED MEDICAL ISSUES  #elevated troponin  #Dyspnea    CHRONIC PROBLEMS:  #DVTs  - Restarting eliquis, continue ASA and Lovenox ppx  #HTN  - HOLD home losartan  #Depression  - continue home fluoxetine  #Dementia   - continue home zyprexa     Access: PIV   Antibiotics: None  Oxygenation: NC 4L  CODE STATUS: Full code confirmed with sister (POA)    Emergency Contact: Extended Emergency Contact Information  Primary Emergency Contact: GIOVANNI REYNOSO  Mobile Phone: [phone]  Relation: Sister  Preferred language: English   needed? No  Secondary Emergency Contact: VICKIE DAIGLE  Mobile Phone: [phone]  Relation: Relative  Preferred language: English   needed? No    Dispo: SNF and  pending medical clearance.     Melody Joel DO  Internal Medicine PGY-2           [1]   Allergies  Allergen Reactions    Procaine Hcl Shortness of breath    Procaine Unknown   [2]   Past Medical History:  Diagnosis Date    Deep vein thrombosis (DVT) of proximal vein of left lower extremity 09/18/2020    Schizophrenia 11/01/2013   [3] History reviewed. No pertinent surgical history.  [4] No family history on file.  [5]   Social History  Tobacco Use    Smoking status: Never    Smokeless tobacco: Never   [6] [Held by provider] apixaban, 2.5 mg, oral, BID  aspirin, 81 mg, oral, Daily  [Held by provider] donepezil, 5 mg, oral, Nightly  enoxaparin, 40 mg, subcutaneous, q24h  FLUoxetine, 30 mg, oral, Daily  [Held by provider] furosemide, 40 mg, intravenous, Daily  [Held by provider] losartan, 100 mg, oral, Daily  magnesium oxide, 400 mg of magnesium oxide, oral, Daily  nystatin, 1 Application, Topical, BID  OLANZapine, 10 mg, oral, Nightly  oxygen, , inhalation, Continuous - Inhalation  pantoprazole, 40 mg, oral, Daily before breakfast  perflutren lipid microspheres, 0.5-10 mL of dilution, intravenous, Once in imaging  polyethylene glycol, 17 g, oral, Daily     [7] PRN medications: levalbuterol  [8]

## 2025-07-13 NOTE — PROGRESS NOTES
Department of Hospital Medicine  Progress Note    Subjective     Stella Agarwal is a 74 y.o. female on Hospital Day: 2 of admission presenting with Acute systolic heart failure.    Overnight Events: NAEON    She is appearing well seated in her chair. She has no complaints at this time. She is not able to properly answer questions.           Objective   Vitals:  Vitals:    07/12/25 1226 07/12/25 1236 07/12/25 1534 07/12/25 1600   BP:  (!) 120/48 (!) 108/48 (!) 108/48   BP Location:  Left arm  Left arm   Patient Position:       Pulse: (!) 49 (!) 49 (!) 41 (!) 42   Resp:  17  23   Temp:    36.5 °C (97.7 °F)   TempSrc:  Temporal  Temporal   SpO2: 97% 98% 100% 99%   Weight:       Height:           Intake/Output last 3 Shifts:  I/O last 3 completed shifts:  In: 558 (6.6 mL/kg) [P.O.:558]  Out: 1600 (18.9 mL/kg) [Urine:1600 (0.5 mL/kg/hr)]  Weight: 84.5 kg   No intake/output data recorded.    Last stool output       Physical Exam:   Physical Exam  Constitutional:       Appearance: Normal appearance.   HENT:      Head:      Comments: Right chronic facial droop  Cardiovascular:      Rate and Rhythm: Regular rhythm. Bradycardia present.      Pulses: Normal pulses.      Heart sounds: Normal heart sounds.   Pulmonary:      Effort: Pulmonary effort is normal.      Comments: Extra expiratory squealing breath sounds in all fields  Abdominal:      General: There is distension.      Tenderness: There is abdominal tenderness.      Comments: (denies tenderness but seems to tense abdomen on palpation).    Musculoskeletal:      Right lower leg: Edema present.      Left lower leg: Edema present.      Comments: +2 up to mid shin   Neurological:      Mental Status: She is alert. She is disoriented.      Comments: AOx1         LABS:    CBC:  Results from last 72 hours   Lab Units 07/12/25  0610 07/11/25  1031   WBC AUTO x10*3/uL 9.0 10.2   RBC AUTO x10*6/uL 3.98* 4.07   HEMOGLOBIN g/dL 11.3* 11.8*   HEMATOCRIT % 37.7 38.6   MCV fL 95 95  "  MCH pg 28.4 29.0   MCHC g/dL 30.0* 30.6*   RDW % 14.9* 15.4*   PLATELETS AUTO x10*3/uL 207 212     CMP:  Results from last 72 hours   Lab Units 07/12/25  0610 07/11/25  1031   SODIUM mmol/L 141 140   POTASSIUM mmol/L 3.9 4.3   CHLORIDE mmol/L 98 98   CO2 mmol/L 36* 33*   ANION GAP mmol/L 11 13   BUN mg/dL 15 19   CREATININE mg/dL 0.89 0.99   EGFR mL/min/1.73m*2 69 60*   GLUCOSE mg/dL 97 127*     Results from last 72 hours   Lab Units 07/12/25  0610 07/11/25  1031   ALBUMIN g/dL 3.2* 3.8   ALK PHOS U/L  --  60   ALT U/L  --  7   AST U/L  --  19   BILIRUBIN TOTAL mg/dL  --  0.9     Calcium/Phos:   Results from last 72 hours   Lab Units 07/12/25  0610 07/11/25  1031   CALCIUM mg/dL 8.2* 8.8   PHOSPHORUS mg/dL 4.4  --       COAG:   Results from last 72 hours   Lab Units 07/11/25  1156   INR  2.1*     CRP:   Lab Results   Component Value Date    CRP 0.41 01/12/2022       ENDO:  Recent Labs     09/04/24  0723   TSH 2.51   HGBA1C 5.5      CARDIAC:   Results from last 72 hours   Lab Units 07/12/25  0820 07/11/25  1649 07/11/25  1317 07/11/25  1031   LD U/L 226  --   --   --    TROPHS ng/L 16* 19* 18* 16*   BNP pg/mL  --   --   --  1,206*       No data recorded    MICRO/ID:   No results found for the last 90 days.                   No lab exists for component: \"AGALPCRNB\"   No results found for: \"URINECULTURE\", \"BLOODCULT\", \"CSFCULTSMEAR\"    IMAGING RESULTS:   XR chest 1 view  Result Date: 7/12/2025  1. Cardiomegaly with perihilar vascular congestion, small bilateral pleural effusion and bibasal atelectasis.   Signed by: Jaleel Godinez 7/12/2025 3:48 PM Dictation workstation:   [id number]    CT chest abdomen pelvis w IV contrast  Result Date: 7/11/2025  1.  Large right and small left pleural effusions. 2.  Near-complete collapse and compressive atelectasis of the right lower lobe. 3.  Cardiomegaly with multichamber enlargement. 4.  Coronary artery calcifications. 5.  No evidence for acute fracture or traumatic subluxation " of the thoracic or lumbar spine. 6.  Multilevel discogenic degenerative changes with mild scoliosis of the lumbar spine. 7.  Trace peritoneal ascites. Signed by Gabino Chavez MD    CT lumbar spine retrospective reconstruction protocol  Result Date: 7/11/2025  1.  Large right and small left pleural effusions. 2.  Near-complete collapse and compressive atelectasis of the right lower lobe. 3.  Cardiomegaly with multichamber enlargement. 4.  Coronary artery calcifications. 5.  No evidence for acute fracture or traumatic subluxation of the thoracic or lumbar spine. 6.  Multilevel discogenic degenerative changes with mild scoliosis of the lumbar spine. 7.  Trace peritoneal ascites. Signed by Gabino Chavez MD    CT thoracic spine retrospective reconstruction protocol  Result Date: 7/11/2025  1.  Large right and small left pleural effusions. 2.  Near-complete collapse and compressive atelectasis of the right lower lobe. 3.  Cardiomegaly with multichamber enlargement. 4.  Coronary artery calcifications. 5.  No evidence for acute fracture or traumatic subluxation of the thoracic or lumbar spine. 6.  Multilevel discogenic degenerative changes with mild scoliosis of the lumbar spine. 7.  Trace peritoneal ascites. Signed by Gabino Chavez MD    CT cervical spine wo IV contrast  Result Date: 7/11/2025  DJD in the cervical spine as described.   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   No CT evidence of cervical spine fracture in this exam.   Moderate-sized right pleural effusion.   MACRO: None   Signed by: Charbel Smith 7/11/2025 10:59 AM Dictation workstation:   [id number]    CT head wo IV contrast  Result Date: 7/11/2025  No depressed skull fracture. No acute intracranial bleed or focal mass effect.   Mild volume loss.     MACRO: None   Signed by: Charbel Smith 7/11/2025 10:51 AM Dictation workstation:   [id number]      ALLERGIES  PAST MEDICAL HISTORY PAST SURGICAL SURGERY FAMILY HISTORY SOCIAL HISTORY   Allergies[1] Medical History[2] Surgical History[3] Family History[4] Social History[5]         MEDS:  HOME MEDS SCHEDULED MEDS PRN IV MEDS   Current Outpatient Medications   Medication Instructions    acetaminophen (TYLENOL) 650 mg, oral, Every 4 hours PRN    alum-mag hydroxide-simeth (Mylanta) 200-200-20 mg/5 mL oral suspension 15 mL, oral, 2 times daily PRN    aspirin 81 mg EC tablet (1) TABLET BY MOUTH ONCE DAILY    benzonatate (TESSALON) 100 mg, oral, 3 times daily PRN, Do not crush or chew.    bisacodyl (ONELAX BISACODYL) 10 mg, rectal, Daily PRN    diphenhydrAMINE (BENADRYL) 25 mg, oral, Every 6 hours PRN    donepezil (ARICEPT) 5 mg, oral, Nightly    Eliquis 2.5 mg, oral, 2 times daily    FLUoxetine (PROzac) 20 mg capsule (1) CAPSULE BY MOUTH EVERY MORNING FOR ANXIETY / DEPRESSION    FLUoxetine (PROZAC) 10 mg, oral, Every morning, Take with 20mg to equal 30mg    furosemide (Lasix) 20 mg tablet (1) TABLET BY MOUTH ONCE DAILY    hydrocortisone 1 % cream 1 Application, Topical, 4 times daily PRN    ibuprofen 400 mg, oral, Every 4 hours PRN    loperamide (IMODIUM) 2 mg, oral, Daily PRN    loratadine (CLARITIN) 10 mg, oral, Daily PRN    losartan (Cozaar) 100 mg tablet (1) TABLET BY MOUTH ONCE DAILY    magnesium hydroxide (Milk of Magnesia) 400 mg/5 mL suspension 30 mL, oral, Daily PRN    metoprolol succinate XL (Toprol-XL) 50 mg 24 hr tablet (1) TABLET BY MOUTH ONCE DAILY    OLANZapine (ZyPREXA) 10 mg tablet (1) TABLET BY MOUTH AT BEDTIME PSYCHOSIS    omeprazole (PRILOSEC) 40 mg, oral, Daily    sennosides (Senokot) 8.6 mg tablet 2 tablets, oral, 2 times daily PRN    sodium phosphates (Fleet Enema) 19-7 gram/118 mL enema enema 1 enema, rectal, Daily PRN    Scheduled Medications[6] PRN Medications[7] Continuous Medications[8]          Assessment/Plan   ASSESSMENT & PLAN  Stella Any is a 74 y.o. female with a PMH of DVT on life-long  eliquis, vascular dementia, multiple falls, HTN, and GERD who presented to [facility] on 7/11/2025 for syncope after mechanical fall. In ED, patient noted to have significant bradycardia in 40s and found to have mildly elevated troponin with significantly elevated BNP and evidence of hypervolemia on exam and imaging. Patient, daughter, and nursing home deny any history of CHF. Admitted to medicine for diuresis iso of suspected CHF and pleural effusion.      #new onset acute CHF exacerbation  #large right and small left pleural effusion  - diuresis with lasix 40 IV daily  - monitor I&Os   - Thoracentesis completed 07/12  - Continue eliquis 24 hours after procedure     #bradycardia  #syncope  #elevated troponin  - troponin of 16 in ED, continue to trend trops  - EKG sinus bradycardia w/o ischemic change  - currently asymptomatic  - telemetry  - hold donepezil  - Cardio had no concerns at this time     #mechanical fall with head injury  #multiple recent falls  - CT head and c/t/l spine showed no acute processes  - PT/OT consult    #Dyspnea  -Ordered Levalbuterol PRN  -O2 supplementation, 4L NC         CHRONIC PROBLEMS:  #DVTs  - holding eliquis, continue ASA  #HTN  - continue home losartan  #Depression  - continue home fluoxetine  #Dementia   - continue home zyprexa     Access: PIV   Antibiotics: None  Oxygenation: NC 4L  CODE STATUS: Full code confirmed with sister (POA)    Emergency Contact: Extended Emergency Contact Information  Primary Emergency Contact: EDGARDOARNALDOGIOVANNI  Mobile Phone: [phone]  Relation: Sister  Preferred language: English   needed? No  Secondary Emergency Contact: VICKIE DAIGLE  Mobile Phone: [phone]  Relation: Relative  Preferred language: English   needed? No    Dispo: SNF and pending medical clearance. Completed thoracentesis today. Will be here for a couple more days most likely    Delano Solano MD  Internal Medicine, PGY-1       [1]   Allergies  Allergen  Reactions    Procaine Hcl Shortness of breath    Procaine Unknown   [2]   Past Medical History:  Diagnosis Date    Deep vein thrombosis (DVT) of proximal vein of left lower extremity 09/18/2020    Schizophrenia 11/01/2013   [3] History reviewed. No pertinent surgical history.  [4] No family history on file.  [5]   Social History  Tobacco Use    Smoking status: Never    Smokeless tobacco: Never   [6] [Held by provider] apixaban, 2.5 mg, oral, BID  aspirin, 81 mg, oral, Daily  [Held by provider] donepezil, 5 mg, oral, Nightly  enoxaparin, 40 mg, subcutaneous, q24h  FLUoxetine, 30 mg, oral, Daily  furosemide, 40 mg, intravenous, Daily  [Held by provider] losartan, 100 mg, oral, Daily  OLANZapine, 10 mg, oral, Nightly  oxygen, , inhalation, Continuous - Inhalation  pantoprazole, 40 mg, oral, Daily before breakfast  perflutren lipid microspheres, 0.5-10 mL of dilution, intravenous, Once in imaging  polyethylene glycol, 17 g, oral, Daily  [7] PRN medications: levalbuterol  [8]

## 2025-07-13 NOTE — PROGRESS NOTES
Physical Therapy    Physical Therapy Evaluation    Patient Name: Stella Agarwal  MRN: [id number]  Department: [address]  Room: [address] [place] [address]  Today's Date: 7/13/2025   Time Calculation  Start Time: 1043  Stop Time: 1059  Time Calculation (min): 16 min    Assessment/Plan   PT Assessment  PT Assessment Results: Decreased strength, Decreased endurance, Impaired balance, Decreased mobility  Rehab Prognosis: Fair  Barriers to Discharge Home: Caregiver assistance, Physical needs  Caregiver Assistance: Caregiver assistance needed per identified barriers - however, level of patient's required assistance exceeds assistance available at home  Physical Needs: Intermittent mobility assistance needed, Intermittent ADL assistance needed, High falls risk due to function or environment  Evaluation/Treatment Tolerance: Patient tolerated treatment well  Medical Staff Made Aware: Yes  Strengths: Housing layout, Support of Caregivers  Barriers to Participation: Comorbidities  End of Session Communication: Bedside nurse  Assessment Comment: Patient with decreased functional mobility compared to reported baseline. Patient would continue to benefit from MOD intensity PT intervention at this time.  End of Session Patient Position: Up in chair, Alarm on  IP OR SWING BED PT PLAN  Inpatient or Swing Bed: Inpatient  PT Plan  Treatment/Interventions: Bed mobility, Transfer training, Gait training, Balance training, Strengthening, Endurance training  PT Plan: Ongoing PT  PT Frequency: 3 times per week (During inpatient hospitalization)  PT Discharge Recommendations: Moderate intensity level of continued care  Based on current functional status and rehab potential, patient is anticipated to tolerate and benefit from 5 or more days per week of skilled rehabilitative therapy after discharge from this acute inpatient hospitalization.    Equipment Recommended upon Discharge: Wheeled walker (owns)  PT Recommended Transfer Status: Assist x1  PT - OK to  Discharge: Yes (per PT POC)    Subjective     PT Visit Info:  PT Received On: 07/13/25  General Visit Information:  General  Reason for Referral: Impaired functional mobility; heart failure  Referred By: Mustapha Paz  Past Medical History Relevant to Rehab: DVT on life-long eliquis, vascular dementia, multiple falls, HTN, and GERD  Family/Caregiver Present: No  Prior to Session Communication: Bedside nurse  Patient Position Received: Bed, 3 rail up, Alarm on  General Comment: Patient pleasant, cooperative and agreeable to PT eval  Home Living:  Home Living  Type of Home: Assisted living  Home Adaptive Equipment: Walker rolling or standard (Rollator)  Home Layout: One level  Home Access: Level entry  Prior Level of Function:  Prior Function Per Pt/Caregiver Report  Level of [place]: Needs assistance with ADLs, Needs assistance with homemaking  ADL Assistance: Needs assistance (SBA)  Homemaking Assistance: Needs assistance (Staff completes)  Ambulatory Assistance: Independent (Rollator, patient reports she walks throughout facility without assistance)  Precautions:  Precautions  Medical Precautions: Fall precautions (tele, purewick 3L O2)  Precautions Comment: thoracentesis 7/12      Date/Time Vitals Session Patient Position Pulse Resp SpO2 BP MAP (mmHg)    07/13/25 1030 --  --  111  --  100 %  --  --     07/13/25 1043 --  --  110  --  97 %  --  --                 Objective   Pain:  Pain Assessment  Pain Assessment: 0-10  0-10 (Numeric) Pain Score: 0 - No pain  Cognition:  Cognition  Overall Cognitive Status: Within Functional Limits  Orientation Level: Oriented X4  Processing Speed: Delayed    General Assessments:     Activity Tolerance  Endurance: Tolerates 10 - 20 min exercise with multiple rests    Sensation  Light Touch: No apparent deficits    Strength  Strength Comments: R LE 4/5, L LE 3+/5    Coordination  Movements are Fluid and Coordinated: Yes    Postural Control  Postural Control: Within Functional  Limits    Static Sitting Balance  Static Sitting-Balance Support: No upper extremity supported, Feet supported  Static Sitting-Level of Assistance: Close supervision    Static Standing Balance  Static Standing-Balance Support: Bilateral upper extremity supported  Static Standing-Level of Assistance: Minimum assistance  Functional Assessments:       Bed Mobility  Bed Mobility: Yes  Bed Mobility 1  Bed Mobility 1: Supine to sitting  Level of Assistance 1: Close supervision  Bed Mobility Comments 1: HOb elevated and use of rail    Transfers  Transfer: Yes  Transfer 1  Transfer From 1: Sit to, Stand to  Transfer to 1: Sit, Stand  Technique 1: Sit to stand, Stand to sit  Transfer Device 1: Walker  Transfer Level of Assistance 1: Minimum assistance  Trials/Comments 1: completed from EOB and from recliner    Ambulation/Gait Training  Ambulation/Gait Training Performed: Yes  Ambulation/Gait Training 1  Surface 1: Level tile  Device 1: Rolling walker  Assistance 1: Minimum assistance  Quality of Gait 1:  (flexed posture, decreased step length and height)  Comments/Distance (ft) 1: 3' (bed>chair)    Outcome Measures:  [facility] Basic Mobility  Turning from your back to your side while in a flat bed without using bedrails: A little  Moving from lying on your back to sitting on the side of a flat bed without using bedrails: A little  Moving to and from bed to chair (including a wheelchair): A little  Standing up from a chair using your arms (e.g. wheelchair or bedside chair): A little  To walk in hospital room: Total  Climbing 3-5 steps with railing: Total  Basic Mobility - Total Score: 14    Encounter Problems       Encounter Problems (Active)       Balance       STG - Maintains static standing balance with upper extremity support x 2' supervision  (Progressing)       Start:  07/13/25    Expected End:  07/27/25               Mobility       STG - Patient will ambulate with 2WW 25' CGA  (Progressing)       Start:  07/13/25     Expected End:  07/27/25               PT Transfers       STG - Patient will perform bed mobility independently  (Progressing)       Start:  07/13/25    Expected End:  07/27/25            STG - Patient will transfer sit to and from stand supervision  (Progressing)       Start:  07/13/25    Expected End:  07/27/25                   Education Documentation  Precautions, taught by Yamila Mcdaniel PT at 7/13/2025 11:51 AM.  Learner: Patient  Readiness: Acceptance  Method: Explanation  Response: Verbalizes Understanding  Comment: Importance of staff assist for functional mobility, monitoring vitals and increasing time out of bed    Mobility Training, taught by Yamila Mcdaniel PT at 7/13/2025 11:51 AM.  Learner: Patient  Readiness: Acceptance  Method: Explanation  Response: Verbalizes Understanding  Comment: Importance of staff assist for functional mobility, monitoring vitals and increasing time out of bed    Education Comments  No comments found.

## 2025-07-14 PROBLEM — J90 PLEURAL EFFUSION: Status: ACTIVE | Noted: 2025-07-14

## 2025-07-14 PROBLEM — J96.01 ACUTE RESPIRATORY FAILURE WITH HYPOXEMIA: Status: ACTIVE | Noted: 2025-07-14

## 2025-07-14 LAB
ALBUMIN SERPL BCP-MCNC: 3.4 G/DL (ref 3.4–5)
ANION GAP SERPL CALC-SCNC: 10 MMOL/L (ref 10–20)
ATRIAL RATE: 44 BPM
BUN SERPL-MCNC: 15 MG/DL (ref 6–23)
CALCIUM SERPL-MCNC: 8.5 MG/DL (ref 8.6–10.3)
CHLORIDE SERPL-SCNC: 97 MMOL/L (ref 98–107)
CO2 SERPL-SCNC: 39 MMOL/L (ref 21–32)
CREAT SERPL-MCNC: 0.9 MG/DL (ref 0.5–1.05)
EGFRCR SERPLBLD CKD-EPI 2021: 68 ML/MIN/1.73M*2
ERYTHROCYTE [DISTWIDTH] IN BLOOD BY AUTOMATED COUNT: 15 % (ref 11.5–14.5)
GLUCOSE SERPL-MCNC: 96 MG/DL (ref 74–99)
HCT VFR BLD AUTO: 35.7 % (ref 36–46)
HGB BLD-MCNC: 10.8 G/DL (ref 12–16)
HOLD SPECIMEN: NORMAL
MAGNESIUM SERPL-MCNC: 2.03 MG/DL (ref 1.6–2.4)
MCH RBC QN AUTO: 28.4 PG (ref 26–34)
MCHC RBC AUTO-ENTMCNC: 30.3 G/DL (ref 32–36)
MCV RBC AUTO: 94 FL (ref 80–100)
NRBC BLD-RTO: 0 /100 WBCS (ref 0–0)
P AXIS: 98 DEGREES
P OFFSET: 140 MS
P ONSET: 118 MS
PHOSPHATE SERPL-MCNC: 2.8 MG/DL (ref 2.5–4.9)
PLATELET # BLD AUTO: 190 X10*3/UL (ref 150–450)
POTASSIUM SERPL-SCNC: 4 MMOL/L (ref 3.5–5.3)
PR INTERVAL: 190 MS
Q ONSET: 213 MS
Q ONSET: 213 MS
Q ONSET: 226 MS
QRS COUNT: 17 BEATS
QRS COUNT: 7 BEATS
QRS COUNT: 7 BEATS
QRS DURATION: 76 MS
QRS DURATION: 80 MS
QRS DURATION: 84 MS
QT INTERVAL: 386 MS
QT INTERVAL: 520 MS
QT INTERVAL: 522 MS
QTC CALCULATION(BAZETT): 435 MS
QTC CALCULATION(BAZETT): 444 MS
QTC CALCULATION(BAZETT): 503 MS
QTC FREDERICIA: 460 MS
QTC FREDERICIA: 463 MS
QTC FREDERICIA: 468 MS
R AXIS: -79 DEGREES
R AXIS: -81 DEGREES
R AXIS: 255 DEGREES
RBC # BLD AUTO: 3.8 X10*6/UL (ref 4–5.2)
SODIUM SERPL-SCNC: 142 MMOL/L (ref 136–145)
T AXIS: -34 DEGREES
T AXIS: 19 DEGREES
T AXIS: 74 DEGREES
T OFFSET: 406 MS
T OFFSET: 473 MS
T OFFSET: 487 MS
VENTRICULAR RATE: 102 BPM
VENTRICULAR RATE: 42 BPM
VENTRICULAR RATE: 44 BPM
WBC # BLD AUTO: 7.8 X10*3/UL (ref 4.4–11.3)

## 2025-07-14 PROCEDURE — 2500000004 HC RX 250 GENERAL PHARMACY W/ HCPCS (ALT 636 FOR OP/ED)

## 2025-07-14 PROCEDURE — 99233 SBSQ HOSP IP/OBS HIGH 50: CPT | Performed by: STUDENT IN AN ORGANIZED HEALTH CARE EDUCATION/TRAINING PROGRAM

## 2025-07-14 PROCEDURE — 1200000002 HC GENERAL ROOM WITH TELEMETRY DAILY

## 2025-07-14 PROCEDURE — 94668 MNPJ CHEST WALL SBSQ: CPT

## 2025-07-14 PROCEDURE — 99223 1ST HOSP IP/OBS HIGH 75: CPT | Performed by: STUDENT IN AN ORGANIZED HEALTH CARE EDUCATION/TRAINING PROGRAM

## 2025-07-14 PROCEDURE — 83735 ASSAY OF MAGNESIUM: CPT

## 2025-07-14 PROCEDURE — 9420000001 HC RT PATIENT EDUCATION 5 MIN

## 2025-07-14 PROCEDURE — 94669 MECHANICAL CHEST WALL OSCILL: CPT

## 2025-07-14 PROCEDURE — 97535 SELF CARE MNGMENT TRAINING: CPT | Mod: GO,CO

## 2025-07-14 PROCEDURE — 99233 SBSQ HOSP IP/OBS HIGH 50: CPT

## 2025-07-14 PROCEDURE — 2500000001 HC RX 250 WO HCPCS SELF ADMINISTERED DRUGS (ALT 637 FOR MEDICARE OP)

## 2025-07-14 PROCEDURE — 36415 COLL VENOUS BLD VENIPUNCTURE: CPT

## 2025-07-14 PROCEDURE — 99232 SBSQ HOSP IP/OBS MODERATE 35: CPT | Performed by: INTERNAL MEDICINE

## 2025-07-14 PROCEDURE — 94760 N-INVAS EAR/PLS OXIMETRY 1: CPT

## 2025-07-14 PROCEDURE — 97530 THERAPEUTIC ACTIVITIES: CPT | Mod: GO,CO

## 2025-07-14 PROCEDURE — 2500000002 HC RX 250 W HCPCS SELF ADMINISTERED DRUGS (ALT 637 FOR MEDICARE OP, ALT 636 FOR OP/ED)

## 2025-07-14 PROCEDURE — 2500000004 HC RX 250 GENERAL PHARMACY W/ HCPCS (ALT 636 FOR OP/ED): Performed by: BEHAVIOR TECHNICIAN

## 2025-07-14 PROCEDURE — 80069 RENAL FUNCTION PANEL: CPT

## 2025-07-14 PROCEDURE — 85027 COMPLETE CBC AUTOMATED: CPT

## 2025-07-14 PROCEDURE — 2500000005 HC RX 250 GENERAL PHARMACY W/O HCPCS: Performed by: STUDENT IN AN ORGANIZED HEALTH CARE EDUCATION/TRAINING PROGRAM

## 2025-07-14 RX ORDER — FUROSEMIDE 10 MG/ML
40 INJECTION INTRAMUSCULAR; INTRAVENOUS DAILY
Status: DISCONTINUED | OUTPATIENT
Start: 2025-07-14 | End: 2025-07-15

## 2025-07-14 RX ADMIN — Medication 2 L/MIN: at 20:36

## 2025-07-14 RX ADMIN — PANTOPRAZOLE SODIUM 40 MG: 40 TABLET, DELAYED RELEASE ORAL at 05:54

## 2025-07-14 RX ADMIN — Medication 3 L/MIN: at 08:42

## 2025-07-14 RX ADMIN — Medication 3 L/MIN: at 08:35

## 2025-07-14 RX ADMIN — NYSTATIN 1 APPLICATION: 100000 POWDER TOPICAL at 08:42

## 2025-07-14 RX ADMIN — FUROSEMIDE 40 MG: 10 INJECTION, SOLUTION INTRAMUSCULAR; INTRAVENOUS at 16:52

## 2025-07-14 RX ADMIN — NYSTATIN 1 APPLICATION: 100000 POWDER TOPICAL at 20:08

## 2025-07-14 RX ADMIN — FLUOXETINE HYDROCHLORIDE 30 MG: 10 CAPSULE ORAL at 08:42

## 2025-07-14 RX ADMIN — Medication 1 TABLET: at 08:42

## 2025-07-14 RX ADMIN — ASPIRIN 81 MG: 81 TABLET, DELAYED RELEASE ORAL at 08:42

## 2025-07-14 RX ADMIN — ENOXAPARIN SODIUM 40 MG: 100 INJECTION SUBCUTANEOUS at 16:52

## 2025-07-14 RX ADMIN — OLANZAPINE 10 MG: 5 TABLET, FILM COATED ORAL at 20:07

## 2025-07-14 ASSESSMENT — COGNITIVE AND FUNCTIONAL STATUS - GENERAL
MOVING TO AND FROM BED TO CHAIR: A LITTLE
TURNING FROM BACK TO SIDE WHILE IN FLAT BAD: A LITTLE
MOVING FROM LYING ON BACK TO SITTING ON SIDE OF FLAT BED WITH BEDRAILS: A LITTLE
WALKING IN HOSPITAL ROOM: A LITTLE
HELP NEEDED FOR BATHING: A LOT
EATING MEALS: A LITTLE
DRESSING REGULAR UPPER BODY CLOTHING: A LITTLE
DRESSING REGULAR LOWER BODY CLOTHING: A LITTLE
EATING MEALS: A LITTLE
MOBILITY SCORE: 17
HELP NEEDED FOR BATHING: A LOT
MOBILITY SCORE: 17
WALKING IN HOSPITAL ROOM: A LITTLE
TOILETING: A LITTLE
HELP NEEDED FOR BATHING: A LITTLE
MOVING TO AND FROM BED TO CHAIR: A LITTLE
CLIMB 3 TO 5 STEPS WITH RAILING: A LOT
DAILY ACTIVITIY SCORE: 20
CLIMB 3 TO 5 STEPS WITH RAILING: A LOT
TOILETING: A LITTLE
DRESSING REGULAR LOWER BODY CLOTHING: A LOT
MOVING FROM LYING ON BACK TO SITTING ON SIDE OF FLAT BED WITH BEDRAILS: A LITTLE
DAILY ACTIVITIY SCORE: 16
PERSONAL GROOMING: A LITTLE
DRESSING REGULAR UPPER BODY CLOTHING: A LITTLE
DAILY ACTIVITIY SCORE: 16
PERSONAL GROOMING: A LITTLE
TURNING FROM BACK TO SIDE WHILE IN FLAT BAD: A LITTLE
STANDING UP FROM CHAIR USING ARMS: A LITTLE
TOILETING: A LITTLE
DRESSING REGULAR LOWER BODY CLOTHING: A LOT
STANDING UP FROM CHAIR USING ARMS: A LITTLE
PERSONAL GROOMING: A LITTLE

## 2025-07-14 ASSESSMENT — ACTIVITIES OF DAILY LIVING (ADL): HOME_MANAGEMENT_TIME_ENTRY: 13

## 2025-07-14 ASSESSMENT — PAIN SCALES - GENERAL
PAINLEVEL_OUTOF10: 0 - NO PAIN

## 2025-07-14 ASSESSMENT — PAIN - FUNCTIONAL ASSESSMENT
PAIN_FUNCTIONAL_ASSESSMENT: 0-10

## 2025-07-14 NOTE — PROGRESS NOTES
Stella Agarwal is a 74 y.o. female on day 3 of admission presenting with Acute systolic heart failure.    Subjective   Thinks breathing has improved since thora       Objective     Physical Exam  Vitals reviewed.   Constitutional:       Appearance: She is ill-appearing.   HENT:      Head: Normocephalic and atraumatic.   Eyes:      Extraocular Movements: Extraocular movements intact.   Cardiovascular:      Rate and Rhythm: Normal rate and regular rhythm.      Heart sounds: Normal heart sounds.   Pulmonary:      Effort: Pulmonary effort is normal.      Comments: Coarse breath sounds bilaterally   Abdominal:      Palpations: Abdomen is soft.      Tenderness: There is no abdominal tenderness.   Musculoskeletal:      Cervical back: Normal range of motion.   Skin:     General: Skin is warm.   Neurological:      General: No focal deficit present.      Mental Status: She is alert and oriented to person, place, and time. Mental status is at baseline.   Psychiatric:         Mood and Affect: Mood normal.         Behavior: Behavior normal.         Last Recorded Vitals  Blood pressure 134/65, pulse 64, temperature 36.1 °C (97 °F), temperature source Temporal, resp. rate 15, height (!) 1.524 m (5'), weight 84.9 kg (187 lb 2.7 oz), SpO2 97%.  Intake/Output last 3 Shifts:  I/O last 3 completed shifts:  In: 940 (11.1 mL/kg) [P.O.:940]  Out: 950 (11.2 mL/kg) [Urine:950 (0.3 mL/kg/hr)]  Weight: 84.9 kg     Relevant Results  Results for orders placed or performed during the hospital encounter of 07/11/25 (from the past 24 hours)   Magnesium   Result Value Ref Range    Magnesium 2.03 1.60 - 2.40 mg/dL   Renal Function Panel   Result Value Ref Range    Glucose 96 74 - 99 mg/dL    Sodium 142 136 - 145 mmol/L    Potassium 4.0 3.5 - 5.3 mmol/L    Chloride 97 (L) 98 - 107 mmol/L    Bicarbonate 39 (H) 21 - 32 mmol/L    Anion Gap 10 10 - 20 mmol/L    Urea Nitrogen 15 6 - 23 mg/dL    Creatinine 0.90 0.50 - 1.05 mg/dL    eGFR 68 >60  mL/min/1.73m*2    Calcium 8.5 (L) 8.6 - 10.3 mg/dL    Phosphorus 2.8 2.5 - 4.9 mg/dL    Albumin 3.4 3.4 - 5.0 g/dL   CBC   Result Value Ref Range    WBC 7.8 4.4 - 11.3 x10*3/uL    nRBC 0.0 0.0 - 0.0 /100 WBCs    RBC 3.80 (L) 4.00 - 5.20 x10*6/uL    Hemoglobin 10.8 (L) 12.0 - 16.0 g/dL    Hematocrit 35.7 (L) 36.0 - 46.0 %    MCV 94 80 - 100 fL    MCH 28.4 26.0 - 34.0 pg    MCHC 30.3 (L) 32.0 - 36.0 g/dL    RDW 15.0 (H) 11.5 - 14.5 %    Platelets 190 150 - 450 x10*3/uL        Scheduled medications  Scheduled Medications[1]  Continuous medications  Continuous Medications[2]  PRN medications  PRN Medications[3]       Assessment/Plan     73 yo woman w/ h/o DVT admitted w/ acute hypoxic resp failure 2/2 CHF exacerbation  Assessment & Plan  Acute respiratory failure with hypoxemia  2/2 CHF exacerbation.  On 2L.  Wean O2 as tolerated.  Cont incentive spirometry   Pleural effusion  S/p thora.  Transudate.  Likely 2/2 CHF.         Matt Zarate MD       [1] [Held by provider] apixaban, 2.5 mg, oral, BID  aspirin, 81 mg, oral, Daily  [Held by provider] donepezil, 5 mg, oral, Nightly  enoxaparin, 40 mg, subcutaneous, q24h  FLUoxetine, 30 mg, oral, Daily  furosemide, 40 mg, intravenous, Daily  [Held by provider] losartan, 100 mg, oral, Daily  magnesium oxide, 400 mg of magnesium oxide, oral, Daily  nystatin, 1 Application, Topical, BID  OLANZapine, 10 mg, oral, Nightly  oxygen, , inhalation, Continuous - Inhalation  pantoprazole, 40 mg, oral, Daily before breakfast  perflutren lipid microspheres, 0.5-10 mL of dilution, intravenous, Once in imaging  polyethylene glycol, 17 g, oral, Daily    [2]    [3] PRN medications: levalbuterol

## 2025-07-14 NOTE — CARE PLAN
The patient's goals for the shift include  Problem: Heart Failure  Goal: Improved gas exchange this shift  Outcome: Progressing  Goal: Improved urinary output this shift  Outcome: Progressing  Goal: Reduction in peripheral edema within 24 hours  Outcome: Progressing  Goal: Report improvement of dyspnea/breathlessness this shift  Outcome: Progressing  Goal: Weight from fluid excess reduced over 2-3 days, then stabilize  Outcome: Progressing  Goal: Increase self care and/or family involvement in 24 hours  Outcome: Progressing     Problem: Pain - Adult  Goal: Verbalizes/displays adequate comfort level or baseline comfort level  Outcome: Progressing     Problem: Safety - Adult  Goal: Free from fall injury  Outcome: Progressing     Problem: Discharge Planning  Goal: Discharge to home or other facility with appropriate resources  Outcome: Progressing     Problem: Chronic Conditions and Co-morbidities  Goal: Patient's chronic conditions and co-morbidity symptoms are monitored and maintained or improved  Outcome: Progressing     Problem: Nutrition  Goal: Nutrient intake appropriate for maintaining nutritional needs  Outcome: Progressing     Problem: Skin  Goal: Decreased wound size/increased tissue granulation at next dressing change  Outcome: Progressing  Goal: Participates in plan/prevention/treatment measures  Outcome: Progressing  Goal: Prevent/manage excess moisture  Outcome: Progressing  Goal: Prevent/minimize sheer/friction injuries  Outcome: Progressing  Goal: Promote/optimize nutrition  Outcome: Progressing  Goal: Promote skin healing  Outcome: Progressing

## 2025-07-14 NOTE — PROGRESS NOTES
07/14/25 1401   Discharge Planning   Living Arrangements Other (Comment)  ((From [facility]))   Support Systems Family members   Assistance Needed Per [facility] at baseline patient is A&OX2, Needs assistance with ADL's(prompt and standby),  Feeds self and ambulates independently with rollator. No active C agency. PCP Mustapha Billings   Type of Residence Assisted living   Do you have animals or pets at home? No   Care Facility Name [facility]   Who is requesting discharge planning? Provider   Home or Post Acute Services Post acute facilities (Rehab/SNF/etc)   Type of Post Acute Facility Services Skilled nursing   Expected Discharge Disposition SNF  (PT recommending moderate intensity rehab, OT reocmmending low intensity rehab. PAQN list provided to patient and sister to review and preference facilities. Will need a precert.)   Does the patient need discharge transport arranged? Yes   [facility] coordination needed? Yes   Has discharge transport been arranged? No   Patient Choice   Provider Choice list and CMS website (https://medicare.gov/care-compare#search) for post-acute Quality and Resource Measure Data were provided and reviewed with: Patient;Family   Patient / Family choosing to utilize agency / facility established prior to hospitalization No   Intensity of Service   Intensity of Service 0-30 min

## 2025-07-14 NOTE — CONSULTS
Inpatient consult to cardiology  Consult performed by: Beto Burgess MD  Consult ordered by: Luis Hagen, APRN-CNP  Reason for consult: syncope        History Of Present Illness:    Stella Agarwal is a 74 y.o. female with PMH of a history of  dementia (+/- schizophrenia based on care everywhere documents from [place]), DVT (Eliquis), vascular dementia, multiple falls, HTN and GERD. EP was consulted for syncope and junctional bradycardia.  Syncope was not witnessed.  She lives in a nursing home and according to report she slipped and fell. Patient reports losing consciousness, but she is a poor historian. Her daughter reports multiple falls over the past 3 months.  Patient was found to be hypoxic, with large right pleural effusion, 700 cc removed via right sided thoracentesis 7/12.  Cytology and cultures are pending.  AFB negative.  Echo is pending.  She is on home metoprolol 50 mg daily.  Labs today show sodium 142 potassium 4.0 EGFR 68 magnesium 2.03.  Prior EKGs as follows: July 28, 2024 showing sinus bradycardia with a heart rate of 45 bpm; July 11, 2025 showing sinus bradycardia with deep PVC and a heart rate of 44 bpm, July 12, 2025 and junctional rhythm with heart rates 42 bpm.  Metoprolol has been held.  Telemetry today showing sinus rhythm with normal heart rates.    Last Recorded Vitals:  Vitals:    07/14/25 0830 07/14/25 0900 07/14/25 1212 07/14/25 1226   BP: 137/52  147/51    BP Location: Left arm  Left arm    Patient Position: Lying  Sitting    Pulse: 56 66 63    Resp: 17  17    Temp: 36.5 °C (97.7 °F)  36.6 °C (97.9 °F)    TempSrc: Temporal  Temporal    SpO2: 99% 98% 98% 98%   Weight:       Height:           Last Labs:  CBC - 7/14/2025:  6:56 AM  7.8 10.8 190    35.7      CMP - 7/14/2025:  6:55 AM  8.5 6.3 19 --- 0.9   2.8 3.4 7 60      PTT - No results in last year.  2.1   23.3 _     Troponin I, High Sensitivity   Date/Time Value Ref Range Status   07/12/2025 08:20 AM 16 (H) 0 - 13 ng/L  Final   07/11/2025 04:49 PM 19 (H) 0 - 13 ng/L Final   07/11/2025 01:17 PM 18 (H) 0 - 13 ng/L Final     BNP   Date/Time Value Ref Range Status   07/11/2025 10:31 AM 1,206 (H) 0 - 99 pg/mL Final     Hemoglobin A1C   Date/Time Value Ref Range Status   09/04/2024 07:23 AM 5.5 see below % Final      Last I/O:  I/O last 3 completed shifts:  In: 894.2 (10.5 mL/kg) [P.O.:640; IV Piggyback:254.2]  Out: 1025 (12.1 mL/kg) [Urine:1025 (0.3 mL/kg/hr)]  Weight: 84.9 kg     Past Medical History:  She has a past medical history of Deep vein thrombosis (DVT) of proximal vein of left lower extremity (09/18/2020) and Schizophrenia (11/01/2013).    Past Surgical History:  She has no past surgical history on file.      Social History:  She reports that she has never smoked. She has never used smokeless tobacco. No history on file for alcohol use and drug use.    Family History:  Family History[1]     Allergies:  Procaine hcl and Procaine    Inpatient Medications:  Scheduled Medications[2]  PRN Medications[3]  Continuous Medications[4]  Outpatient Medications:  Current Outpatient Medications   Medication Instructions    acetaminophen (TYLENOL) 650 mg, oral, Every 4 hours PRN    alum-mag hydroxide-simeth (Mylanta) 200-200-20 mg/5 mL oral suspension 15 mL, oral, 2 times daily PRN    aspirin 81 mg EC tablet (1) TABLET BY MOUTH ONCE DAILY    benzonatate (TESSALON) 100 mg, oral, 3 times daily PRN, Do not crush or chew.    bisacodyl (ONELAX BISACODYL) 10 mg, rectal, Daily PRN    diphenhydrAMINE (BENADRYL) 25 mg, oral, Every 6 hours PRN    donepezil (ARICEPT) 5 mg, oral, Nightly    Eliquis 2.5 mg, oral, 2 times daily    FLUoxetine (PROzac) 20 mg capsule (1) CAPSULE BY MOUTH EVERY MORNING FOR ANXIETY / DEPRESSION    FLUoxetine (PROZAC) 10 mg, oral, Every morning, Take with 20mg to equal 30mg    furosemide (Lasix) 20 mg tablet (1) TABLET BY MOUTH ONCE DAILY    hydrocortisone 1 % cream 1 Application, Topical, 4 times daily PRN    ibuprofen 400  mg, oral, Every 4 hours PRN    loperamide (IMODIUM) 2 mg, oral, Daily PRN    loratadine (CLARITIN) 10 mg, oral, Daily PRN    losartan (Cozaar) 100 mg tablet (1) TABLET BY MOUTH ONCE DAILY    magnesium hydroxide (Milk of Magnesia) 400 mg/5 mL suspension 30 mL, oral, Daily PRN    metoprolol succinate XL (Toprol-XL) 50 mg 24 hr tablet (1) TABLET BY MOUTH ONCE DAILY    OLANZapine (ZyPREXA) 10 mg tablet (1) TABLET BY MOUTH AT BEDTIME PSYCHOSIS    omeprazole (PRILOSEC) 40 mg, oral, Daily    sennosides (Senokot) 8.6 mg tablet 2 tablets, oral, 2 times daily PRN    sodium phosphates (Fleet Enema) 19-7 gram/118 mL enema enema 1 enema, rectal, Daily PRN       A 14 point review of systems was done and is negative other than as stated in HPI     Physical Exam  Cardiovascular:      Rate and Rhythm: Regular rhythm. Bradycardia present.      Heart sounds: No murmur heard.     No friction rub. No gallop.   Pulmonary:      Effort: Pulmonary effort is normal.      Breath sounds: Normal breath sounds.   Abdominal:      Palpations: Abdomen is soft.   Musculoskeletal:      Cervical back: Neck supple.   Neurological:      Mental Status: She is alert.           Assessment/Plan   Syncope/junction bradycardia:    EP was consulted for syncope and junctional bradycardia.  Syncope was not witnessed.  She lives in a nursing home and according to report she slipped and fell. Patient reports losing consciousness, but she is a poor historian. Her daughter reports multiple falls over the past 3 months.  Patient was found to be hypoxic, with large right pleural effusion, 700 cc removed via right sided thoracentesis 7/12.  Cytology and cultures are pending.  AFB negative.  Echo is pending.  She is on home metoprolol 50 mg daily.  Labs today show sodium 142 potassium 4.0 EGFR 68 magnesium 2.03.  Prior EKGs as follows: July 28, 2024 showing sinus bradycardia with a heart rate of 45 bpm; July 11, 2025 showing sinus bradycardia with deep PVC and a heart  rate of 44 bpm, July 12, 2025 and junctional rhythm with heart rates 42 bpm.    Her metoprolol has been held.  Echocardiogram is pending.  Telemetry showing sinus rhythm with normal heart rates today.  No clear indication for permanent pacemaker implantation at this time.  Would recommend keeping patient off of AV lazaro blocking agents if possible.    Peripheral IV 20 G Anterior;Right Forearm (Active)   Site Assessment Clean;Dry;Intact 07/14/25 0841   Dressing Status Clean;Dry 07/14/25 0841   Number of days:        Code Status:  Full Code          Beto Lemus MD         [1] No family history on file.  [2]   Scheduled medications   Medication Dose Route Frequency    [Held by provider] apixaban  2.5 mg oral BID    aspirin  81 mg oral Daily    [Held by provider] donepezil  5 mg oral Nightly    enoxaparin  40 mg subcutaneous q24h    FLUoxetine  30 mg oral Daily    furosemide  40 mg intravenous Daily    [Held by provider] losartan  100 mg oral Daily    magnesium oxide  400 mg of magnesium oxide oral Daily    nystatin  1 Application Topical BID    OLANZapine  10 mg oral Nightly    oxygen   inhalation Continuous - Inhalation    pantoprazole  40 mg oral Daily before breakfast    perflutren lipid microspheres  0.5-10 mL of dilution intravenous Once in imaging    polyethylene glycol  17 g oral Daily   [3]   PRN medications   Medication    levalbuterol   [4]   Continuous Medications   Medication Dose Last Rate

## 2025-07-14 NOTE — PROGRESS NOTES
Occupational Therapy    Occupational Therapy Treatment    Name: Stella Agarwal  MRN: [id number]  Department: [address]  Room: [address] [place] [address]  Date: 07/14/25  Time Calculation  Start Time: 1226  Stop Time: 1249  Time Calculation (min): 23 min    Assessment:  OT Assessment: Pt continues to present with decreased endurance/activity tolerance impacting ADL performance and functional mobility. Continue to recomend low intensity OT services to maximize pt safety and independence after discharge.  Prognosis: Good  Barriers to Discharge Home: No anticipated barriers  Evaluation/Treatment Tolerance: Patient tolerated treatment well  Medical Staff Made Aware: Yes  End of Session Communication: Bedside nurse  End of Session Patient Position: Up in chair, Alarm on  Plan:  Treatment Interventions: ADL retraining, Functional transfer training, Endurance training  OT Frequency: 2 times per week  OT Discharge Recommendations: Low intensity level of continued care  Equipment Recommended upon Discharge: Wheeled walker (owns)  OT Recommended Transfer Status: Stand by assist  OT - OK to Discharge: Yes (In accord with OT POC)    Subjective     OT Visit Info:  OT Received On: 07/14/25  General:  General  Reason for Referral: Impaired ADL and related  mobility; heart failure  Referred By: Mustapha Paz  Past Medical History Relevant to Rehab: DVT on life-long eliquis, vascular dementia, multiple falls, HTN, and GERD  Family/Caregiver Present: No  Prior to Session Communication: Bedside nurse, Care Coordinator  Patient Position Received: Up in chair, Alarm on  Preferred Learning Style: verbal  General Comment: Pt pleasant and cooperative, increased time needed for processing instruction provided in course of session.  Precautions:  Medical Precautions: Fall precautions (telemetry, purewick external catheter.)  Precautions Comment: thoracentesis 7/12     Date/Time Vitals Session Patient Position Pulse Resp SpO2 BP MAP (mmHg)    07/14/25 1212 --  --   63  17  98 %  147/51  83     07/14/25 1226 --  --  --  --  98 %  --  --                Pain Assessment:  Pain Assessment  Pain Assessment: 0-10  0-10 (Numeric) Pain Score: 0 - No pain    Objective   Cognition:  Overall Cognitive Status: Within Functional Limits  Arousal/Alertness: Appropriate responses to stimuli  Orientation Level: Oriented X4  Processing Speed: Delayed  Activities of Daily Living: LE Dressing  LE Dressing: Yes  Pants Level of Assistance: Setup, Contact guard  Sock Level of Assistance: Setup, Contact guard  Shoe Level of Assistance: Minimum assistance, Minimal verbal cues  LE Dressing Where Assessed: Recliner  LE Dressing Comments: Increased time on task using figure four position for tasks. Increased assist for velcro closure shoes in part d/t swelling in B feet.     Bed Mobility/Transfers: Bed Mobility  Bed Mobility: Yes  Bed Mobility 1  Bed Mobility 1: Supine to sitting  Level of Assistance 1: Close supervision  Bed Mobility Comments 1: HOB ~20 degrees using bed rail. Increased time on task.    Outcome Measures:  [facility] Daily Activity  Putting on and taking off regular lower body clothing: A little  Bathing (including washing, rinsing, drying): A little  Putting on and taking off regular upper body clothing: None  Toileting, which includes using toilet, bedpan or urinal: A little  Taking care of personal grooming such as brushing teeth: A little  Eating Meals: None  Daily Activity - Total Score: 20    Education Documentation  Body Mechanics, taught by SAMIA Umaña at 7/14/2025  1:19 PM.  Learner: Patient  Readiness: Acceptance  Method: Explanation, Demonstration  Response: Verbalizes Understanding, Demonstrated Understanding, Needs Reinforcement  Comment: Pt educated safety in ADL and mobility components as well as energy conservation and work simplification and use of adaptive techniques. Pt demonstrated good compliance to instruction. Repetition indicated.    Precautions, taught by Rafael SHAVER  SAMIA Rojas at 7/14/2025  1:19 PM.  Learner: Patient  Readiness: Acceptance  Method: Explanation, Demonstration  Response: Verbalizes Understanding, Demonstrated Understanding, Needs Reinforcement  Comment: Pt educated safety in ADL and mobility components as well as energy conservation and work simplification and use of adaptive techniques. Pt demonstrated good compliance to instruction. Repetition indicated.    ADL Training, taught by SAMIA Umaña at 7/14/2025  1:19 PM.  Learner: Patient  Readiness: Acceptance  Method: Explanation, Demonstration  Response: Verbalizes Understanding, Demonstrated Understanding, Needs Reinforcement  Comment: Pt educated safety in ADL and mobility components as well as energy conservation and work simplification and use of adaptive techniques. Pt demonstrated good compliance to instruction. Repetition indicated.    Goals:  Encounter Problems       Encounter Problems (Active)       OT Goals       Pt will tolerate 10min stand during functional task completion with no more than 1 rest break in order to increase endurance for functional task completion.        Start:  07/12/25    Expected End:  07/26/25            Pt will complete [id number] transfers using LRD in preparation for ADLs with SBA  (Progressing)       Start:  07/12/25    Expected End:  07/26/25            Pt will increase endurance to tolerate 15min of OOB activity with no more than 1 rest break in order to increase ability to engage in ADL completion.  (Progressing)       Start:  07/12/25    Expected End:  07/26/25            Pt will demo LE ADL completion with supervision, using AE if needed.  (Progressing)       Start:  07/12/25    Expected End:  07/26/25            Pt will complete ileana hygiene and clothing management during toileting ADL with SBA, using DME and adaptive strategies as neccesary        Start:  07/12/25    Expected End:  07/26/25

## 2025-07-14 NOTE — PROGRESS NOTES
Subjective    No acute events overnight.    Patient sleeping this morning. Patient's nurse notes she has been doing well other than episodes of hypotension yesterday.      Objective    Temp:  [36.3 °C (97.3 °F)-36.6 °C (97.9 °F)] 36.5 °C (97.7 °F)  Heart Rate:  [49-71] 66  Resp:  [16-18] 17  BP: ()/(43-68) 137/52    Physical Exam  Constitutional:       Appearance: Normal appearance.      Comments: Comfortably sleeping, lying flat in bed   Cardiovascular:      Rate and Rhythm: Normal rate and regular rhythm.      Heart sounds: Normal heart sounds. No murmur heard.  Pulmonary:      Effort: Pulmonary effort is normal.      Breath sounds: Normal breath sounds. No wheezing or rales.   Abdominal:      General: Abdomen is flat. There is no distension.      Palpations: Abdomen is soft. There is no mass.      Tenderness: There is no abdominal tenderness.   Musculoskeletal:      Right lower leg: Edema (trace) present.      Left lower leg: Edema (trace) present.   Skin:     Capillary Refill: Capillary refill takes less than 2 seconds.   Neurological:      General: No focal deficit present.      Mental Status: She is alert and oriented to person, place, and time.   Psychiatric:         Mood and Affect: Mood normal.         Behavior: Behavior normal.         Scheduled medications  Scheduled Medications[1]  Continuous medications  Continuous Medications[2]  PRN medications  PRN Medications[3]    Results for orders placed or performed during the hospital encounter of 07/11/25 (from the past 24 hours)   Magnesium   Result Value Ref Range    Magnesium 2.03 1.60 - 2.40 mg/dL   Renal Function Panel   Result Value Ref Range    Glucose 96 74 - 99 mg/dL    Sodium 142 136 - 145 mmol/L    Potassium 4.0 3.5 - 5.3 mmol/L    Chloride 97 (L) 98 - 107 mmol/L    Bicarbonate 39 (H) 21 - 32 mmol/L    Anion Gap 10 10 - 20 mmol/L    Urea Nitrogen 15 6 - 23 mg/dL    Creatinine 0.90 0.50 - 1.05 mg/dL    eGFR 68 >60 mL/min/1.73m*2    Calcium 8.5  (L) 8.6 - 10.3 mg/dL    Phosphorus 2.8 2.5 - 4.9 mg/dL    Albumin 3.4 3.4 - 5.0 g/dL   CBC   Result Value Ref Range    WBC 7.8 4.4 - 11.3 x10*3/uL    nRBC 0.0 0.0 - 0.0 /100 WBCs    RBC 3.80 (L) 4.00 - 5.20 x10*6/uL    Hemoglobin 10.8 (L) 12.0 - 16.0 g/dL    Hematocrit 35.7 (L) 36.0 - 46.0 %    MCV 94 80 - 100 fL    MCH 28.4 26.0 - 34.0 pg    MCHC 30.3 (L) 32.0 - 36.0 g/dL    RDW 15.0 (H) 11.5 - 14.5 %    Platelets 190 150 - 450 x10*3/uL       Imaging  XR chest 1 view  Result Date: 7/12/2025  1. Cardiomegaly with perihilar vascular congestion, small bilateral pleural effusion and bibasal atelectasis.   Signed by: Jaleel Godinez 7/12/2025 3:48 PM Dictation workstation:   [id number]    CT chest abdomen pelvis w IV contrast  Result Date: 7/11/2025  1.  Large right and small left pleural effusions. 2.  Near-complete collapse and compressive atelectasis of the right lower lobe. 3.  Cardiomegaly with multichamber enlargement. 4.  Coronary artery calcifications. 5.  No evidence for acute fracture or traumatic subluxation of the thoracic or lumbar spine. 6.  Multilevel discogenic degenerative changes with mild scoliosis of the lumbar spine. 7.  Trace peritoneal ascites. Signed by Gabino Chavez MD    CT lumbar spine retrospective reconstruction protocol  Result Date: 7/11/2025  1.  Large right and small left pleural effusions. 2.  Near-complete collapse and compressive atelectasis of the right lower lobe. 3.  Cardiomegaly with multichamber enlargement. 4.  Coronary artery calcifications. 5.  No evidence for acute fracture or traumatic subluxation of the thoracic or lumbar spine. 6.  Multilevel discogenic degenerative changes with mild scoliosis of the lumbar spine. 7.  Trace peritoneal ascites. Signed by Gabino Chavez MD    CT thoracic spine retrospective reconstruction protocol  Result Date: 7/11/2025  1.  Large right and small left pleural effusions. 2.  Near-complete collapse and compressive atelectasis of the  right lower lobe. 3.  Cardiomegaly with multichamber enlargement. 4.  Coronary artery calcifications. 5.  No evidence for acute fracture or traumatic subluxation of the thoracic or lumbar spine. 6.  Multilevel discogenic degenerative changes with mild scoliosis of the lumbar spine. 7.  Trace peritoneal ascites. Signed by Gabino Chavez MD    CT cervical spine wo IV contrast  Result Date: 7/11/2025  DJD in the cervical spine as described.   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   No CT evidence of cervical spine fracture in this exam.   Moderate-sized right pleural effusion.   MACRO: None   Signed by: Charbel Smith 7/11/2025 10:59 AM Dictation workstation:   [id number]    CT head wo IV contrast  Result Date: 7/11/2025  No depressed skull fracture. No acute intracranial bleed or focal mass effect.   Mild volume loss.     MACRO: None   Signed by: Charbel Smith 7/11/2025 10:51 AM Dictation workstation:   [id number]      Cardiology, Vascular, and Other Imaging  No other imaging results found for the past 7 days      Assessment & Plan    Stella Agarwal is a 74 y.o. female with a PMH of DVT on life-long eliquis, vascular dementia, multiple falls, HTN, and GERD who presented to [facility] on 7/11/2025 for syncope after mechanical fall. In ED, patient noted to have significant bradycardia in 40s and found to have mildly elevated troponin with significantly elevated BNP and evidence of hypervolemia on exam and imaging. Patient, daughter, and nursing home deny any history of CHF. Admitted to medicine for diuresis iso of suspected CHF and pleural effusion.     #new onset acute CHF exacerbation  #large right and small left pleural effusion s/p thoracentesis  #Hypotension - resolving  - Given IV Lasix 40 mg in ED  - Thoracentesis done 7/12/25, CXR showed no PTX and hemothorax  - SBP improved, now 120-130s  - volume status improved on  exam  Plan:  - HOLD losartan and anti-HTN meds  - HOLD IV Lasix 40 mg for now   - Encourage IS and bronchial hygiene   - monitor I&Os         #Tachy-bradycardia  #syncope  #mechanical fall with head injury on anticoagulation  #multiple recent falls  - CT head and c/t/l spine showed no acute processes  - EKG sinus bradycardia w/o ischemic change  - currently asymptomatic  Plan:  - telemetry  - holding donepezil   - Pending TTE  - Cardiology following, appreciate recs  - holding eliquis, may require pacemaker placement.   - PT recommend moderate intensity on dc  - OT recommend low intensity on dc        RESOLVED MEDICAL ISSUES  #elevated troponin  #Dyspnea     CHRONIC PROBLEMS:  #DVTs  - holding eliquis, continue ASA and Lovenox ppx  #HTN  - HOLD home losartan  #Depression  - continue home fluoxetine  #Dementia   - continue home zyprexa     Access: PIV   Antibiotics: None  Oxygenation: NC 3L  CODE STATUS: Full code confirmed with sister (POA)     Emergency Contact: Extended Emergency Contact Information  Primary Emergency Contact: GIOVANNI REYNOSO  Mobile Phone: [phone]  Relation: Sister  Preferred language: English   needed? No  Secondary Emergency Contact: VICKIE DAIGLE  Mobile Phone: [phone]  Relation: Relative  Preferred language: English   needed? No     Dispo: SNF and pending medical clearance.     Aguilar Burton  MS4               [1] [Held by provider] apixaban, 2.5 mg, oral, BID  aspirin, 81 mg, oral, Daily  [Held by provider] donepezil, 5 mg, oral, Nightly  enoxaparin, 40 mg, subcutaneous, q24h  FLUoxetine, 30 mg, oral, Daily  [Held by provider] furosemide, 40 mg, intravenous, Daily  [Held by provider] losartan, 100 mg, oral, Daily  magnesium oxide, 400 mg of magnesium oxide, oral, Daily  nystatin, 1 Application, Topical, BID  OLANZapine, 10 mg, oral, Nightly  oxygen, , inhalation, Continuous - Inhalation  pantoprazole, 40 mg, oral, Daily before breakfast  perflutren lipid  microspheres, 0.5-10 mL of dilution, intravenous, Once in imaging  polyethylene glycol, 17 g, oral, Daily  [2]    [3] PRN medications: levalbuterol

## 2025-07-14 NOTE — CARE PLAN
The patient's goals for the shift include sleeping     The clinical goals for the shift include pt will remain safe through out the shift

## 2025-07-14 NOTE — CONSULTS
"Inpatient consult to Cardiology  Consult performed by: Delano Solano MD  Consult ordered by: Mustapha Paz MD        Objective Data:  Last Recorded Vitals:  Vitals:    07/14/25 0600 07/14/25 0753 07/14/25 0830 07/14/25 0900   BP:   137/52    BP Location:   Left arm    Patient Position:   Lying    Pulse:  71 56 66   Resp:   17    Temp:   36.5 °C (97.7 °F)    TempSrc:   Temporal    SpO2:  (!) 76% 99% 98%   Weight: 84.9 kg (187 lb 2.7 oz)      Height:           Last Labs:  CBC - 7/14/2025:  6:56 AM  7.8 10.8 190    35.7      CMP - 7/14/2025:  6:55 AM  8.5 6.3 19 --- 0.9   2.8 3.4 7 60      PTT - No results in last year.  2.1   23.3 _     TROPHS   Date/Time Value Ref Range Status   07/12/2025 08:20 AM 16 0 - 13 ng/L Final   07/11/2025 04:49 PM 19 0 - 13 ng/L Final   07/11/2025 01:17 PM 18 0 - 13 ng/L Final     BNP   Date/Time Value Ref Range Status   07/11/2025 10:31 AM 1,206 0 - 99 pg/mL Final     HGBA1C   Date/Time Value Ref Range Status   09/04/2024 07:23 AM 5.5 see below % Final      Last I/O:  I/O last 3 completed shifts:  In: 894.2 (10.5 mL/kg) [P.O.:640; IV Piggyback:254.2]  Out: 1025 (12.1 mL/kg) [Urine:1025 (0.3 mL/kg/hr)]  Weight: 84.9 kg     Past Cardiology Tests (Last 3 Years):  EKG:  No results found for this or any previous visit from the past 1095 days.    Echo:  No results found for this or any previous visit from the past 1095 days.    Ejection Fractions:  No results found for: \"EF\"  Cath:  No results found for this or any previous visit from the past 1095 days.    Stress Test:  No results found for this or any previous visit from the past 1095 days.    Imaging:  Chest x-ray   1. Cardiomegaly with perihilar vascular congestion, small bilateral  pleural effusion and bibasal atelectasis.    CT chest abdomen   1.  Large right and small left pleural effusions.  2.  Near-complete collapse and compressive atelectasis of the right  lower lobe.  3.  Cardiomegaly with multichamber enlargement.  4.  Coronary " artery calcifications.  5.  No evidence for acute fracture or traumatic subluxation of the  thoracic or lumbar spine.  6.  Multilevel discogenic degenerative changes with mild scoliosis of  the lumbar spine.  7.  Trace peritoneal ascites.      Inpatient Medications:  Scheduled Medications[1]  PRN Medications[2]  Continuous Medications[3]    Physical Exam:  Physical Exam  Vitals reviewed.   HENT:      Head: Normocephalic.      Nose: Nose normal.   Eyes:      Pupils: Pupils are equal, round, and reactive to light.   Cardiovascular:      Rate and Rhythm: Normal rate and regular rhythm.   Pulmonary:      Effort: Pulmonary effort is normal.      Breath sounds rhonchi in the bases bilaterally   Abdominal:      General: Abdomen is flat.      Palpations: Abdomen is soft.   Musculoskeletal:         General: Normal range of motion.      Cervical back: Normal range of motion.   Skin:     General: Skin is warm and dry.   Neurological:      General: No focal deficit present.      Mental Status: He is alert and oriented to person, place, and time.   Psychiatric:         Mood and Affect: Mood normal.      Extremities trace pedal edema       Assessment/Plan   Stella Agarwal is a very pleasant 74 year old female with a history of DVT (Eliquis), vascular dementia, multiple falls, HTN and GERD, presents to the ER after a syncopal event. The syncope was not witnessed. Patient is a poor historian. This morning denies pain, unsure why she is in the hospital. Noted in the chart she was walking in her room at the nursing home with her walker and slipped. Her walker hit her in the face and fell backwards hitting her head on the floor. She denies any pain. Denies chest pain, heart palpitations or dizziness. Her daughter has noticed she has had shortness of breath for the past three days. The daughter states she has had multiple falls over the past three months. The daughter states she has a cardiologist at [facility]. She had a period of worsening  bradycardia and hypotension after her thoracentesis. Resting well two days after.      Heart failure  -large right and small left pleural effusion on CT scan thoracentesis yesterday 700 ml of fluid removed chest x-ray showed small bilateral pleural effusion  -Start diuresis since she is normotensive  -Lasix 40 mg BID  -Monitor I&O   -Monitor weight - 190 lbs on 07/11  -Current weight 187 lbs  -Continue to hold the Eliquis   -Repeat Cardiac ECHO    #Asymptomatic Bradycardia    #Syncope   #Elevated troponin   -elevated troponin secondary to demand ischemia (resolved)  -Patient reports LOC when she fell  -Consult electrophysiologist        Peripheral IV 20 G Anterior;Right Forearm (Active)   Site Assessment Clean;Dry;Intact 07/12/25 2050   Dressing Status Clean;Dry;Occlusive 07/12/25 2050   Number of days:        Code Status:  Full Code    I spent 10 minutes in the professional and overall care of this patient.    Delano Solano MD  Internal Medicine, PGY-1       [1]   Scheduled medications   Medication Dose Route Frequency    [Held by provider] apixaban  2.5 mg oral BID    aspirin  81 mg oral Daily    [Held by provider] donepezil  5 mg oral Nightly    enoxaparin  40 mg subcutaneous q24h    FLUoxetine  30 mg oral Daily    [Held by provider] furosemide  40 mg intravenous Daily    [Held by provider] losartan  100 mg oral Daily    magnesium oxide  400 mg of magnesium oxide oral Daily    nystatin  1 Application Topical BID    OLANZapine  10 mg oral Nightly    oxygen   inhalation Continuous - Inhalation    pantoprazole  40 mg oral Daily before breakfast    perflutren lipid microspheres  0.5-10 mL of dilution intravenous Once in imaging    polyethylene glycol  17 g oral Daily   [2]   PRN medications   Medication    levalbuterol   [3]   Continuous Medications   Medication Dose Last Rate

## 2025-07-14 NOTE — CARE PLAN
The patient's goals for the shift include  Problem: Heart Failure  Goal: Improved gas exchange this shift  Outcome: Progressing  Goal: Improved urinary output this shift  Outcome: Progressing  Goal: Reduction in peripheral edema within 24 hours  Outcome: Progressing  Goal: Report improvement of dyspnea/breathlessness this shift  Outcome: Progressing  Goal: Weight from fluid excess reduced over 2-3 days, then stabilize  Outcome: Progressing  Goal: Increase self care and/or family involvement in 24 hours  Outcome: Progressing     Problem: Pain - Adult  Goal: Verbalizes/displays adequate comfort level or baseline comfort level  Outcome: Progressing     Problem: Safety - Adult  Goal: Free from fall injury  Outcome: Progressing     Problem: Discharge Planning  Goal: Discharge to home or other facility with appropriate resources  Outcome: Progressing     Problem: Chronic Conditions and Co-morbidities  Goal: Patient's chronic conditions and co-morbidity symptoms are monitored and maintained or improved  Outcome: Progressing     Problem: Nutrition  Goal: Nutrient intake appropriate for maintaining nutritional needs  Outcome: Progressing     Problem: Skin  Goal: Decreased wound size/increased tissue granulation at next dressing change  Outcome: Progressing  Goal: Participates in plan/prevention/treatment measures  Outcome: Progressing  Goal: Prevent/manage excess moisture  Outcome: Progressing  Goal: Prevent/minimize sheer/friction injuries  Outcome: Progressing  Goal: Promote/optimize nutrition  Outcome: Progressing  Goal: Promote skin healing  7/14/2025 1055 by Tiesha Arzola LPN  Flowsheets (Taken 7/14/2025 1055)  Promote skin healing: Turn/reposition every 2 hours/use positioning/transfer devices  7/14/2025 1055 by Tiesha Arzola LPN  Outcome: Progressing  Flowsheets (Taken 7/14/2025 1055)  Promote skin healing: Turn/reposition every 2 hours/use positioning/transfer devices

## 2025-07-15 ENCOUNTER — APPOINTMENT (OUTPATIENT)
Dept: CARDIOLOGY | Facility: HOSPITAL | Age: 75
DRG: 242 | End: 2025-07-15
Payer: COMMERCIAL

## 2025-07-15 ENCOUNTER — APPOINTMENT (OUTPATIENT)
Dept: RADIOLOGY | Facility: HOSPITAL | Age: 75
DRG: 242 | End: 2025-07-15
Payer: COMMERCIAL

## 2025-07-15 LAB
ALBUMIN SERPL BCP-MCNC: 3.2 G/DL (ref 3.4–5)
ANION GAP SERPL CALC-SCNC: 10 MMOL/L (ref 10–20)
AORTIC VALVE MEAN GRADIENT: 5 MMHG
AORTIC VALVE PEAK VELOCITY: 1.69 M/S
AV PEAK GRADIENT: 11 MMHG
AVA (PEAK VEL): 2.83 CM2
AVA (VTI): 2.68 CM2
BASE EXCESS BLDV CALC-SCNC: 12.9 MMOL/L (ref -2–3)
BODY TEMPERATURE: ABNORMAL
BUN SERPL-MCNC: 12 MG/DL (ref 6–23)
CALCIUM SERPL-MCNC: 8.5 MG/DL (ref 8.6–10.3)
CHLORIDE SERPL-SCNC: 98 MMOL/L (ref 98–107)
CO2 SERPL-SCNC: 40 MMOL/L (ref 21–32)
CREAT SERPL-MCNC: 0.73 MG/DL (ref 0.5–1.05)
EGFRCR SERPLBLD CKD-EPI 2021: 87 ML/MIN/1.73M*2
EJECTION FRACTION APICAL 4 CHAMBER: 85.9
EJECTION FRACTION: 73 %
ERYTHROCYTE [DISTWIDTH] IN BLOOD BY AUTOMATED COUNT: 15.1 % (ref 11.5–14.5)
GLUCOSE SERPL-MCNC: 91 MG/DL (ref 74–99)
HCO3 BLDV-SCNC: 41.6 MMOL/L (ref 22–26)
HCT VFR BLD AUTO: 35.6 % (ref 36–46)
HGB BLD-MCNC: 10.5 G/DL (ref 12–16)
INHALED O2 CONCENTRATION: 2 %
LEFT ATRIUM VOLUME AREA LENGTH INDEX BSA: 19.2 ML/M2
LEFT VENTRICLE INTERNAL DIMENSION DIASTOLE: 4.42 CM (ref 3.5–6)
LEFT VENTRICULAR OUTFLOW TRACT DIAMETER: 2.23 CM
MAGNESIUM SERPL-MCNC: 2.04 MG/DL (ref 1.6–2.4)
MCH RBC QN AUTO: 28.2 PG (ref 26–34)
MCHC RBC AUTO-ENTMCNC: 29.5 G/DL (ref 32–36)
MCV RBC AUTO: 96 FL (ref 80–100)
NRBC BLD-RTO: 0 /100 WBCS (ref 0–0)
OXYHGB MFR BLDV: 77.1 % (ref 45–75)
PCO2 BLDV: 72 MM HG (ref 41–51)
PH BLDV: 7.37 PH (ref 7.33–7.43)
PHOSPHATE SERPL-MCNC: 2.9 MG/DL (ref 2.5–4.9)
PLATELET # BLD AUTO: 188 X10*3/UL (ref 150–450)
PO2 BLDV: 48 MM HG (ref 35–45)
POTASSIUM SERPL-SCNC: 4.1 MMOL/L (ref 3.5–5.3)
RBC # BLD AUTO: 3.72 X10*6/UL (ref 4–5.2)
RIGHT VENTRICLE FREE WALL PEAK S': 12 CM/S
RIGHT VENTRICLE PEAK SYSTOLIC PRESSURE: 10 MMHG
SAO2 % BLDV: 79 % (ref 45–75)
SODIUM SERPL-SCNC: 144 MMOL/L (ref 136–145)
TRICUSPID ANNULAR PLANE SYSTOLIC EXCURSION: 1.2 CM
UFH PPP CHRO-ACNC: 0.5 IU/ML (ref ?–1.1)
UFH PPP CHRO-ACNC: 1.4 IU/ML (ref ?–1.1)
UFH PPP CHRO-ACNC: 2 IU/ML (ref ?–1.1)
WBC # BLD AUTO: 7.1 X10*3/UL (ref 4.4–11.3)

## 2025-07-15 PROCEDURE — 99232 SBSQ HOSP IP/OBS MODERATE 35: CPT | Performed by: INTERNAL MEDICINE

## 2025-07-15 PROCEDURE — 84100 ASSAY OF PHOSPHORUS: CPT

## 2025-07-15 PROCEDURE — 85027 COMPLETE CBC AUTOMATED: CPT

## 2025-07-15 PROCEDURE — 2500000001 HC RX 250 WO HCPCS SELF ADMINISTERED DRUGS (ALT 637 FOR MEDICARE OP)

## 2025-07-15 PROCEDURE — 1200000002 HC GENERAL ROOM WITH TELEMETRY DAILY

## 2025-07-15 PROCEDURE — 36415 COLL VENOUS BLD VENIPUNCTURE: CPT

## 2025-07-15 PROCEDURE — 2500000004 HC RX 250 GENERAL PHARMACY W/ HCPCS (ALT 636 FOR OP/ED): Performed by: BEHAVIOR TECHNICIAN

## 2025-07-15 PROCEDURE — 93306 TTE W/DOPPLER COMPLETE: CPT | Performed by: STUDENT IN AN ORGANIZED HEALTH CARE EDUCATION/TRAINING PROGRAM

## 2025-07-15 PROCEDURE — 93306 TTE W/DOPPLER COMPLETE: CPT

## 2025-07-15 PROCEDURE — 99233 SBSQ HOSP IP/OBS HIGH 50: CPT

## 2025-07-15 PROCEDURE — 71045 X-RAY EXAM CHEST 1 VIEW: CPT

## 2025-07-15 PROCEDURE — 2500000004 HC RX 250 GENERAL PHARMACY W/ HCPCS (ALT 636 FOR OP/ED)

## 2025-07-15 PROCEDURE — 83735 ASSAY OF MAGNESIUM: CPT

## 2025-07-15 PROCEDURE — 71045 X-RAY EXAM CHEST 1 VIEW: CPT | Performed by: STUDENT IN AN ORGANIZED HEALTH CARE EDUCATION/TRAINING PROGRAM

## 2025-07-15 PROCEDURE — 85520 HEPARIN ASSAY: CPT | Performed by: STUDENT IN AN ORGANIZED HEALTH CARE EDUCATION/TRAINING PROGRAM

## 2025-07-15 PROCEDURE — 85520 HEPARIN ASSAY: CPT

## 2025-07-15 PROCEDURE — 82805 BLOOD GASES W/O2 SATURATION: CPT

## 2025-07-15 PROCEDURE — 93005 ELECTROCARDIOGRAM TRACING: CPT

## 2025-07-15 PROCEDURE — 2500000002 HC RX 250 W HCPCS SELF ADMINISTERED DRUGS (ALT 637 FOR MEDICARE OP, ALT 636 FOR OP/ED)

## 2025-07-15 PROCEDURE — 2500000005 HC RX 250 GENERAL PHARMACY W/O HCPCS: Performed by: STUDENT IN AN ORGANIZED HEALTH CARE EDUCATION/TRAINING PROGRAM

## 2025-07-15 RX ORDER — HEPARIN SODIUM 10000 [USP'U]/100ML
0-4500 INJECTION, SOLUTION INTRAVENOUS CONTINUOUS
Status: DISCONTINUED | OUTPATIENT
Start: 2025-07-15 | End: 2025-07-17

## 2025-07-15 RX ORDER — METOPROLOL TARTRATE 1 MG/ML
5 INJECTION, SOLUTION INTRAVENOUS EVERY 6 HOURS PRN
Status: DISCONTINUED | OUTPATIENT
Start: 2025-07-15 | End: 2025-07-23 | Stop reason: HOSPADM

## 2025-07-15 RX ORDER — FUROSEMIDE 10 MG/ML
40 INJECTION INTRAMUSCULAR; INTRAVENOUS DAILY
Status: DISCONTINUED | OUTPATIENT
Start: 2025-07-15 | End: 2025-07-18

## 2025-07-15 RX ORDER — METOPROLOL TARTRATE 1 MG/ML
5 INJECTION, SOLUTION INTRAVENOUS ONCE
Status: COMPLETED | OUTPATIENT
Start: 2025-07-15 | End: 2025-07-15

## 2025-07-15 RX ORDER — FUROSEMIDE 10 MG/ML
40 INJECTION INTRAMUSCULAR; INTRAVENOUS 2 TIMES DAILY
Status: DISCONTINUED | OUTPATIENT
Start: 2025-07-15 | End: 2025-07-15

## 2025-07-15 RX ADMIN — ASPIRIN 81 MG: 81 TABLET, DELAYED RELEASE ORAL at 10:10

## 2025-07-15 RX ADMIN — Medication 1 TABLET: at 10:10

## 2025-07-15 RX ADMIN — POLYETHYLENE GLYCOL 3350 17 G: 17 POWDER, FOR SOLUTION ORAL at 10:11

## 2025-07-15 RX ADMIN — NYSTATIN 1 APPLICATION: 100000 POWDER TOPICAL at 20:03

## 2025-07-15 RX ADMIN — FUROSEMIDE 40 MG: 10 INJECTION, SOLUTION INTRAMUSCULAR; INTRAVENOUS at 10:10

## 2025-07-15 RX ADMIN — OLANZAPINE 10 MG: 5 TABLET, FILM COATED ORAL at 20:03

## 2025-07-15 RX ADMIN — NYSTATIN 1 APPLICATION: 100000 POWDER TOPICAL at 10:13

## 2025-07-15 RX ADMIN — Medication 3 L/MIN: at 10:39

## 2025-07-15 RX ADMIN — HEPARIN SODIUM 1500 UNITS/HR: 10000 INJECTION, SOLUTION INTRAVENOUS at 13:32

## 2025-07-15 RX ADMIN — FLUOXETINE HYDROCHLORIDE 30 MG: 10 CAPSULE ORAL at 10:10

## 2025-07-15 RX ADMIN — Medication 21 PERCENT: at 23:22

## 2025-07-15 RX ADMIN — PANTOPRAZOLE SODIUM 40 MG: 40 TABLET, DELAYED RELEASE ORAL at 05:34

## 2025-07-15 RX ADMIN — METOPROLOL TARTRATE 5 MG: 5 INJECTION INTRAVENOUS at 13:22

## 2025-07-15 ASSESSMENT — PAIN SCALES - GENERAL
PAINLEVEL_OUTOF10: 0 - NO PAIN
PAINLEVEL_OUTOF10: 0 - NO PAIN

## 2025-07-15 ASSESSMENT — COGNITIVE AND FUNCTIONAL STATUS - GENERAL
WALKING IN HOSPITAL ROOM: A LITTLE
STANDING UP FROM CHAIR USING ARMS: A LITTLE
PERSONAL GROOMING: A LITTLE
MOBILITY SCORE: 17
EATING MEALS: A LITTLE
MOBILITY SCORE: 17
MOVING TO AND FROM BED TO CHAIR: A LITTLE
PERSONAL GROOMING: A LITTLE
DAILY ACTIVITIY SCORE: 16
DRESSING REGULAR LOWER BODY CLOTHING: A LOT
TURNING FROM BACK TO SIDE WHILE IN FLAT BAD: A LITTLE
HELP NEEDED FOR BATHING: A LOT
WALKING IN HOSPITAL ROOM: A LITTLE
EATING MEALS: A LITTLE
CLIMB 3 TO 5 STEPS WITH RAILING: A LOT
STANDING UP FROM CHAIR USING ARMS: A LITTLE
TOILETING: A LITTLE
CLIMB 3 TO 5 STEPS WITH RAILING: A LOT
MOVING FROM LYING ON BACK TO SITTING ON SIDE OF FLAT BED WITH BEDRAILS: A LITTLE
TURNING FROM BACK TO SIDE WHILE IN FLAT BAD: A LITTLE
DAILY ACTIVITIY SCORE: 16
DRESSING REGULAR UPPER BODY CLOTHING: A LITTLE
MOVING TO AND FROM BED TO CHAIR: A LITTLE
DRESSING REGULAR LOWER BODY CLOTHING: A LOT
DRESSING REGULAR UPPER BODY CLOTHING: A LITTLE
TOILETING: A LITTLE
MOVING FROM LYING ON BACK TO SITTING ON SIDE OF FLAT BED WITH BEDRAILS: A LITTLE
HELP NEEDED FOR BATHING: A LOT

## 2025-07-15 ASSESSMENT — PAIN - FUNCTIONAL ASSESSMENT: PAIN_FUNCTIONAL_ASSESSMENT: 0-10

## 2025-07-15 NOTE — PROGRESS NOTES
Subjective    Patient No acute events overnight.    Patient sleeping this morning.     Objective    Temp:  [36.1 °C (97 °F)-36.6 °C (97.9 °F)] 36.3 °C (97.3 °F)  Heart Rate:  [53-73] 63  Resp:  [15-20] 18  BP: (102-162)/(51-73) 162/70    Physical Exam  Constitutional:       Appearance: Normal appearance.      Comments: Patient comfortably sleeping, lying flat in bed, snoring   HENT:      Head: Normocephalic and atraumatic.   Cardiovascular:      Rate and Rhythm: Normal rate.      Heart sounds: Normal heart sounds. No murmur heard.  Pulmonary:      Effort: Pulmonary effort is normal.      Comments: Unable to appreciate lung sounds due to patient snoring  Abdominal:      General: Abdomen is flat. There is no distension.      Palpations: Abdomen is soft. There is no mass.   Musculoskeletal:      Right lower leg: Edema (pitting edema to mid shin) present.      Left lower leg: Edema (pitting edema to mid shin) present.   Neurological:      Mental Status: She is alert.         Scheduled medications  Scheduled Medications[1]  Continuous medications  Continuous Medications[2]  PRN medications  PRN Medications[3]    Results for orders placed or performed during the hospital encounter of 07/11/25 (from the past 24 hours)   Magnesium   Result Value Ref Range    Magnesium 2.04 1.60 - 2.40 mg/dL   Renal Function Panel   Result Value Ref Range    Glucose 91 74 - 99 mg/dL    Sodium 144 136 - 145 mmol/L    Potassium 4.1 3.5 - 5.3 mmol/L    Chloride 98 98 - 107 mmol/L    Bicarbonate 40 (HH) 21 - 32 mmol/L    Anion Gap 10 10 - 20 mmol/L    Urea Nitrogen 12 6 - 23 mg/dL    Creatinine 0.73 0.50 - 1.05 mg/dL    eGFR 87 >60 mL/min/1.73m*2    Calcium 8.5 (L) 8.6 - 10.3 mg/dL    Phosphorus 2.9 2.5 - 4.9 mg/dL    Albumin 3.2 (L) 3.4 - 5.0 g/dL   CBC   Result Value Ref Range    WBC 7.1 4.4 - 11.3 x10*3/uL    nRBC 0.0 0.0 - 0.0 /100 WBCs    RBC 3.72 (L) 4.00 - 5.20 x10*6/uL    Hemoglobin 10.5 (L) 12.0 - 16.0 g/dL    Hematocrit 35.6 (L) 36.0 -  46.0 %    MCV 96 80 - 100 fL    MCH 28.2 26.0 - 34.0 pg    MCHC 29.5 (L) 32.0 - 36.0 g/dL    RDW 15.1 (H) 11.5 - 14.5 %    Platelets 188 150 - 450 x10*3/uL   Blood Gas Venous   Result Value Ref Range    POCT pH, Venous 7.37 7.33 - 7.43 pH    POCT pCO2, Venous 72 (HH) 41 - 51 mm Hg    POCT pO2, Venous 48 (H) 35 - 45 mm Hg    POCT SO2, Venous 79 (H) 45 - 75 %    POCT Oxy Hemoglobin, Venous 77.1 (H) 45.0 - 75.0 %    POCT Base Excess, Venous 12.9 (H) -2.0 - 3.0 mmol/L    POCT HCO3 Calculated, Venous 41.6 (H) 22.0 - 26.0 mmol/L    Patient Temperature      FiO2 2 %   Transthoracic Echo Complete   Result Value Ref Range    AV pk armando 1.69 m/s    AV mn grad 5 mmHg    LVOT diam 2.23 cm    LA vol index A/L 19.2 ml/m2    Tricuspid annular plane systolic excursion 1.2 cm    LV EF 73 %    RV free wall pk S' 12.00 cm/s    LVIDd 4.42 cm    RVSP 10 mmHg    Aortic Valve Area by Continuity of VTI 2.68 cm2    Aortic Valve Area by Continuity of Peak Velocity 2.83 cm2    AV pk grad 11 mmHg    LV A4C EF 85.9        Imaging  XR chest 1 view  Result Date: 7/12/2025  1. Cardiomegaly with perihilar vascular congestion, small bilateral pleural effusion and bibasal atelectasis.   Signed by: Jaleel Godinez 7/12/2025 3:48 PM Dictation workstation:   [id number]    CT chest abdomen pelvis w IV contrast  Result Date: 7/11/2025  1.  Large right and small left pleural effusions. 2.  Near-complete collapse and compressive atelectasis of the right lower lobe. 3.  Cardiomegaly with multichamber enlargement. 4.  Coronary artery calcifications. 5.  No evidence for acute fracture or traumatic subluxation of the thoracic or lumbar spine. 6.  Multilevel discogenic degenerative changes with mild scoliosis of the lumbar spine. 7.  Trace peritoneal ascites. Signed by Gabino Chavez MD    CT lumbar spine retrospective reconstruction protocol  Result Date: 7/11/2025  1.  Large right and small left pleural effusions. 2.  Near-complete collapse and compressive  atelectasis of the right lower lobe. 3.  Cardiomegaly with multichamber enlargement. 4.  Coronary artery calcifications. 5.  No evidence for acute fracture or traumatic subluxation of the thoracic or lumbar spine. 6.  Multilevel discogenic degenerative changes with mild scoliosis of the lumbar spine. 7.  Trace peritoneal ascites. Signed by Gabino Chavez MD    CT thoracic spine retrospective reconstruction protocol  Result Date: 7/11/2025  1.  Large right and small left pleural effusions. 2.  Near-complete collapse and compressive atelectasis of the right lower lobe. 3.  Cardiomegaly with multichamber enlargement. 4.  Coronary artery calcifications. 5.  No evidence for acute fracture or traumatic subluxation of the thoracic or lumbar spine. 6.  Multilevel discogenic degenerative changes with mild scoliosis of the lumbar spine. 7.  Trace peritoneal ascites. Signed by Gabino Chavez MD    CT cervical spine wo IV contrast  Result Date: 7/11/2025  DJD in the cervical spine as described.   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   No CT evidence of cervical spine fracture in this exam.   Moderate-sized right pleural effusion.   MACRO: None   Signed by: Charbel Smith 7/11/2025 10:59 AM Dictation workstation:   [id number]    CT head wo IV contrast  Result Date: 7/11/2025  No depressed skull fracture. No acute intracranial bleed or focal mass effect.   Mild volume loss.     MACRO: None   Signed by: Charbel Smith 7/11/2025 10:51 AM Dictation workstation:   [id number]      Cardiology, Vascular, and Other Imaging  Transthoracic Echo Complete  Result Date: 7/15/2025   [facility], [address] [place] [address]               Tel [phone] and Fax [phone] TRANSTHORACIC ECHOCARDIOGRAM REPORT  Patient Name:       JAKE VALDIVIA      Reading Physician:    [id number] Avtar                                                                Joselyn COLINDRES Study Date:         7/15/2025           Ordering Provider:    [id number] DAVID GONZALEZ                                                               JAMES MRN/PID:            [id number]            Fellow: Accession#:         [id number]        Nurse: Date of Birth/Age:  1950 / 74      Sonographer:          Comfort jarquin RDCS Gender assigned at  F                   Additional Staff: Birth: Height:             152.40 cm           Admit Date:           7/11/2025 Weight:             84.82 kg            Admission Status:     Inpatient -                                                               Routine BSA / BMI:          1.81 m2 / 36.52     Encounter#:           [id number]                     kg/m2 Blood Pressure:     119/68 mmHg         Department Location:  [facility] Non                                                               Invasive Study Type:    TRANSTHORACIC ECHO (TTE) COMPLETE Diagnosis/ICD: Acute systolic (congestive) heart failure (CHF)-I50.21;                Bradycardia, unspecified-R00.1 Indication:    CHF, Bradycardia CPT Code:      Echo Complete w Full Doppler-32333 Patient History: Pertinent History: Previous DVT, HTN, Syncope and Elev BNP. Study Detail: The following Echo studies were performed: 2D, M-Mode, Doppler and               color flow. The patient was awake.  PHYSICIAN INTERPRETATION: Left Ventricle: The left ventricular systolic function is normal with a visually estimated ejection fraction of 70-75%. The left ventricular cavity size is normal. There is mildly increased septal and normal posterior left ventricular wall thickness. Left ventricular diastolic filling cannot be determined due to E/A wave fusion. Left Atrium: The left atrial size was not well visualized. Right Ventricle: The right ventricle is normal in size. There is normal right ventricular global systolic function. RV free wall is  not visualized. Right Atrium: The right atrial size was not well visualized. Aortic Valve: The aortic valve was not well visualized. The aortic valve area by VTI is 2.68 cmï¿½ with a peak velocity of 1.69 m/s. The peak and mean gradients are 7 mmHg and 5 mmHg, respectively with a dimensionless index of 0.69. There is indeterminate aortic valve regurgitation. Mitral Valve: The mitral valve was not well visualized. The doppler estimated peak and mean diastolic pressure gradients are 4.1 mmHg and 2 mmHg, respectively. The mean gradient of the mitral valve is 2 mmHg. Mitral valve regurgitation was not assessed. The E Vmax is 0.96 m/s. Tricuspid Valve: The tricuspid valve was not well visualized. Tricuspid regurgitation was not assessed. The right ventricular systolic pressure could not be estimated. Pulmonic Valve: The pulmonic valve is not well visualized. The pulmonic valve regurgitation was not well visualized. Pericardium: Trivial to small pericardial effusion. There is a pericardial fat pad present. Aorta: The aortic root is normal. Systemic Veins: The inferior vena cava appears normal in size, with IVC inspiratory collapse greater than 50%.  CONCLUSIONS:  1. Poorly visualized anatomical structures due to suboptimal image quality.  2. The left ventricular systolic function is normal with a visually estimated ejection fraction of 70-75%.  3. There is normal right ventricular global systolic function. QUANTITATIVE DATA SUMMARY:  2D MEASUREMENTS:          Normal Ranges: IVSd:            1.09 cm  (0.6-1.1cm) LVPWd:           0.81 cm  (0.6-1.1cm) LVIDd:           4.42 cm  (3.9-5.9cm) LVIDs:           3.52 cm LV Mass Index:   77 g/m2 LVEDV Index:     29 ml/m2 LV % FS          20.4 %  LEFT ATRIUM:                  Normal Ranges: LA Vol A4C:        37.1 ml    (22+/-6mL/m2) LA Vol A2C:        28.6 ml LA Vol BP:         34.9 ml LA Vol Index A4C:  20.5 ml/m2 LA Vol Index A2C:  15.7 ml/m2 LA Vol Index BP:   19.2 ml/m2 LA Area  A4C:       15.5 cm2 LA Area A2C:       12.7 cm2 LA Major Axis A4C: 5.5 cm LA Major Axis A2C: 4.8 cm LA Volume Index:   19.3 ml/m2 LA Vol A4C:        34.1 ml LA Vol A2C:        26.6 ml LA Vol Index BSA:  16.7 ml/m2  RIGHT ATRIUM:         Normal Ranges: RA Area A4C:  9.3 cm2  AORTA MEASUREMENTS:         Normal Ranges: Ao Sinus, d:        3.00 cm (2.1-3.5cm) Asc Ao, d:          3.20 cm (2.1-3.4cm)  LV SYSTOLIC FUNCTION:                      Normal Ranges: EF-A4C View:    86 % (>=55%) EF-A2C View:    73 % EF-Biplane:     80 % EF-Visual:      73 % LV EF Reported: 73 %  LV DIASTOLIC FUNCTION:          Normal Ranges: MV Peak E:             0.96 m/s (0.7-1.2 m/s)  MITRAL VALVE:          Normal Ranges: MV Vmax:      1.01 m/s (<=1.3m/s) MV peak P.1 mmHg (<5mmHg) MV mean P.9 mmHg (<2mmHg) MV VTI:       19.17 cm (10-13cm) MV DT:        124 msec (150-240msec)  AORTIC VALVE:                      Normal Ranges: AoV Vmax:                1.69 m/s  (<=1.7m/s) AoV Peak P.4 mmHg (<20mmHg) AoV Mean P.5 mmHg  (1.7-11.5mmHg) LVOT Max Ronny:            1.22 m/s  (<=1.1m/s) AoV VTI:                 24.89 cm  (18-25cm) LVOT VTI:                17.10 cm LVOT Diameter:           2.23 cm   (1.8-2.4cm) AoV Area, VTI:           2.68 cm2  (2.5-5.5cm2) AoV Area,Vmax:           2.83 cm2  (2.5-4.5cm2) AoV Dimensionless Index: 0.69  RIGHT VENTRICLE: RV Basal 2.80 cm RV Mid   1.80 cm RV Major 6.1 cm TAPSE:   12.0 mm RV s'    0.12 m/s  TRICUSPID VALVE/RVSP:          Normal Ranges: Peak TR Velocity:     1.30 m/s Est. RA Pressure:     3 RV Syst Pressure:     10       (< 30mmHg) IVC Diam:             1.00 cm  PULMONIC VALVE:           Normal Ranges: PV Max Ronny:     1.6 m/s   (0.6-0.9m/s) PV Max PG:      10.1 mmHg  AORTA: Asc Ao Diam 3.17 cm  [id number] Avtar Alves MD Electronically signed on 7/15/2025 at 11:49:16 AM  ** Final **     Electrocardiogram, 12-lead PRN ACS symptoms  Result Date: 2025  Marked sinus  bradycardia with Premature atrial complexes with Aberrant conduction Left axis deviation Low voltage QRS Inferior infarct , age undetermined Cannot rule out Anterior infarct (cited on or before 28-JUL-2024) Abnormal ECG When compared with ECG of 28-JUL-2024 08:28, Aberrant conduction is now Present Questionable change in initial forces of Septal leads    Electrocardiogram, 12-lead PRN ACS symptoms  Result Date: 7/14/2025  Atrial fibrillation with rapid ventricular response Right superior axis deviation Pulmonary disease pattern Right ventricular hypertrophy Inferior infarct (cited on or before 11-JUL-2025) Prolonged QT Abnormal ECG When compared with ECG of 11-JUL-2025 10:19, (unconfirmed) Significant changes have occurred    ECG 12 lead  Result Date: 7/14/2025  Atrial fibrillation with slow ventricular response with a competing junctional pacemaker Left axis deviation Low voltage QRS Septal infarct , age undetermined Inferior infarct (cited on or before 11-JUL-2025) ST & T wave abnormality, consider anterior ischemia Abnormal ECG When compared with ECG of 11-JUL-2025 18:15, (unconfirmed) Significant changes have occurred        Assessment & Plan    Stella Agarwal is a 74 y.o. female with a PMH of DVT on life-long eliquis, vascular dementia, multiple falls, HTN, and GERD who presented to [facility] on 7/11/2025 for syncope after mechanical fall. In ED, patient noted to have significant bradycardia in 40s and found to have mildly elevated troponin with significantly elevated BNP and evidence of hypervolemia on exam and imaging. Patient, daughter, and nursing home deny any history of CHF. Admitted to medicine for diuresis iso of suspected CHF and pleural effusion.      #new onset acute CHF exacerbation  #large right and small left pleural effusion s/p thoracentesis  - Thoracentesis done 7/12/25, CXR showed no PTX and hemothorax  - BP stable  - appears hypervolemic on exam  - weight down to 185 lbs from 190 lbs on  admission  - TTE showed 70% EF with normal systolic function   Plan:  - continue diuresis with lasix 40 mg BID  - Encourage IS and bronchial hygiene   - monitor I&Os     #hypercapnia  - elevated bicarb and pCO2 possibly 2/2 CINDI  - pH stable  - SpO2 stable on RA  Plan:  - continue to monitor, consider PPV if pH worsens     #Tachy-bradycardia  #syncope  #mechanical fall with head injury on anticoagulation  #multiple recent falls  - CT head and c/t/l spine showed no acute processes  - EKG on admission sinus bradycardia w/o ischemic change  - currently asymptomatic  Plan:  - telemetry  - Cardiology following, recommended: avoid AV node inhibitors, not a candidate for pacemaker  - discontinued donepezil  - holding metoprolol  - Holding eliquis, cont heparin gtt  - PT recommend moderate intensity on dc  - OT recommend low intensity on dc        RESOLVED MEDICAL ISSUES  #elevated troponin  #Dyspnea  #Hypotension     CHRONIC PROBLEMS:  #DVTs  - Holding eliquis, cont heparin gtt  #HTN  - HOLD home losartan  - HOLD home metoprolol  #Depression  - continue home fluoxetine  #Dementia   - continue home zyprexa     Access: PIV   Antibiotics: None  Oxygenation: None  CODE STATUS: Full code confirmed with sister (POA)     Emergency Contact: Extended Emergency Contact Information  Primary Emergency Contact: GIOVANNI REYNOSO  Mobile Phone: [phone]  Relation: Sister  Preferred language: English   needed? No  Secondary Emergency Contact: VICKIE DAIGLE  Mobile Phone: [phone]  Relation: Relative  Preferred language: English   needed? No     Dispo: SNF once medically clear. 74 year old female admitted with new-onset CHF requiring diuresis.      Aguilar Burton  MS4         ATTESTATION TO MEDICAL STUDENT NOTE     Assessment & Plan:   As above    - Patient experienced AfibRVR with rates 120+ on 7/15 at ~1300  - Ordered lopressor 5mg IV & continuing heparin gtt per cardiology.   - Currently rate-controlled &  asymptomatic.    PGY-2 Attestation: I saw and evaluated the patient.  I personally obtained the key and critical portions of the history and physical exam or was physically present for key and critical portions performed by the student. I reviewed the student's documentation and discussed the patient with the student.  I agree with the documentation as detailed in the note unless stated otherwise in this attestation.     Bhakti Grimaldo DO  Internal Medicine, PGY-2  07/15/25 at 1:45PM         [1] apixaban, 2.5 mg, oral, BID  aspirin, 81 mg, oral, Daily  FLUoxetine, 30 mg, oral, Daily  furosemide, 40 mg, intravenous, BID  [Held by provider] losartan, 100 mg, oral, Daily  magnesium oxide, 400 mg of magnesium oxide, oral, Daily  nystatin, 1 Application, Topical, BID  OLANZapine, 10 mg, oral, Nightly  oxygen, , inhalation, Continuous - Inhalation  pantoprazole, 40 mg, oral, Daily before breakfast  perflutren lipid microspheres, 0.5-10 mL of dilution, intravenous, Once in imaging  polyethylene glycol, 17 g, oral, Daily  [2]    [3] PRN medications: levalbuterol

## 2025-07-15 NOTE — PROGRESS NOTES
Physical Therapy                 Therapy Communication Note    Patient Name: Stella Agarwal  MRN: [id number]  Department: [address]  Room: [address] [place], [place] [address]  Today's Date: 7/15/2025     Discipline: Physical Therapy    Missed Visit: PT Missed Visit: Yes     Missed Visit Reason: Missed Visit Reason: Patient placed on medical hold (status discussed with Nsg at 1258, pt currently in Afib RVR, cardiology on consult and anticipate assessment within an hour. Advised to hold at this time. No tx completed)    Missed Time: Attempt    Comment:

## 2025-07-15 NOTE — PROGRESS NOTES
Stella Agarwal is a 74 y.o. female on day 4 of admission presenting with Acute systolic heart failure.    Subjective   Patient seen and examined at bedside. Hx is limited b/c patient was asleep. She was able to be aroused w/ sternal rub but would fall back to sleep.    Objective     Physical Exam  Vitals reviewed.   Constitutional:       Comments: Patient was asleep and snoring but would arouse w/ sternal rub then fall back to sleep. Wears compression stockings.    Cardiovascular:      Rate and Rhythm: Normal rate and regular rhythm.      Heart sounds: No murmur heard.  Pulmonary:      Comments: On 2L O2 via NC  Unable to listen for lung sounds as patient was snoring      Last Recorded Vitals  Blood pressure 162/70, pulse 63, temperature 36.3 °C (97.3 °F), temperature source Temporal, resp. rate 18, height (!) 1.524 m (5'), weight 84.1 kg (185 lb 6.5 oz), SpO2 99%.  Intake/Output last 3 Shifts:  I/O last 3 completed shifts:  In: 300 (3.6 mL/kg) [P.O.:300]  Out: 1900 (22.6 mL/kg) [Urine:1900 (0.6 mL/kg/hr)]  Weight: 84.1 kg     Relevant Results  Scheduled medications  Scheduled Medications[1]  Continuous medications  Continuous Medications[2]  PRN medications  PRN Medications[3]    Results for orders placed or performed during the hospital encounter of 07/11/25 (from the past 24 hours)   Magnesium   Result Value Ref Range    Magnesium 2.04 1.60 - 2.40 mg/dL   Renal Function Panel   Result Value Ref Range    Glucose 91 74 - 99 mg/dL    Sodium 144 136 - 145 mmol/L    Potassium 4.1 3.5 - 5.3 mmol/L    Chloride 98 98 - 107 mmol/L    Bicarbonate 40 (HH) 21 - 32 mmol/L    Anion Gap 10 10 - 20 mmol/L    Urea Nitrogen 12 6 - 23 mg/dL    Creatinine 0.73 0.50 - 1.05 mg/dL    eGFR 87 >60 mL/min/1.73m*2    Calcium 8.5 (L) 8.6 - 10.3 mg/dL    Phosphorus 2.9 2.5 - 4.9 mg/dL    Albumin 3.2 (L) 3.4 - 5.0 g/dL   CBC   Result Value Ref Range    WBC 7.1 4.4 - 11.3 x10*3/uL    nRBC 0.0 0.0 - 0.0 /100 WBCs    RBC 3.72 (L) 4.00 - 5.20  x10*6/uL    Hemoglobin 10.5 (L) 12.0 - 16.0 g/dL    Hematocrit 35.6 (L) 36.0 - 46.0 %    MCV 96 80 - 100 fL    MCH 28.2 26.0 - 34.0 pg    MCHC 29.5 (L) 32.0 - 36.0 g/dL    RDW 15.1 (H) 11.5 - 14.5 %    Platelets 188 150 - 450 x10*3/uL   Blood Gas Venous   Result Value Ref Range    POCT pH, Venous 7.37 7.33 - 7.43 pH    POCT pCO2, Venous 72 (HH) 41 - 51 mm Hg    POCT pO2, Venous 48 (H) 35 - 45 mm Hg    POCT SO2, Venous 79 (H) 45 - 75 %    POCT Oxy Hemoglobin, Venous 77.1 (H) 45.0 - 75.0 %    POCT Base Excess, Venous 12.9 (H) -2.0 - 3.0 mmol/L    POCT HCO3 Calculated, Venous 41.6 (H) 22.0 - 26.0 mmol/L    Patient Temperature      FiO2 2 %   Transthoracic Echo Complete   Result Value Ref Range    AV pk armando 1.69 m/s    AV mn grad 5 mmHg    LVOT diam 2.23 cm    LA vol index A/L 19.2 ml/m2    Tricuspid annular plane systolic excursion 1.2 cm    LV EF 73 %    RV free wall pk S' 12.00 cm/s    LVIDd 4.42 cm    RVSP 10 mmHg    Aortic Valve Area by Continuity of VTI 2.68 cm2    Aortic Valve Area by Continuity of Peak Velocity 2.83 cm2    AV pk grad 11 mmHg    LV A4C EF 85.9      Electrocardiogram, 12-lead PRN ACS symptoms  Result Date: 7/14/2025  Marked sinus bradycardia with Premature atrial complexes with Aberrant conduction Left axis deviation Low voltage QRS Inferior infarct , age undetermined Cannot rule out Anterior infarct (cited on or before 28-JUL-2024) Abnormal ECG When compared with ECG of 28-JUL-2024 08:28, Aberrant conduction is now Present Questionable change in initial forces of Septal leads    Electrocardiogram, 12-lead PRN ACS symptoms  Result Date: 7/14/2025  Atrial fibrillation with rapid ventricular response Right superior axis deviation Pulmonary disease pattern Right ventricular hypertrophy Inferior infarct (cited on or before 11-JUL-2025) Prolonged QT Abnormal ECG When compared with ECG of 11-JUL-2025 10:19, (unconfirmed) Significant changes have occurred    ECG 12 lead  Result Date: 7/14/2025  Atrial  fibrillation with slow ventricular response with a competing junctional pacemaker Left axis deviation Low voltage QRS Septal infarct , age undetermined Inferior infarct (cited on or before 11-JUL-2025) ST & T wave abnormality, consider anterior ischemia Abnormal ECG When compared with ECG of 11-JUL-2025 18:15, (unconfirmed) Significant changes have occurred    XR chest 1 view  Result Date: 7/12/2025  Interpreted By:  Jaleel Godinez, STUDY: XR CHEST 1 VIEW; 7/12/2025 2:09 pm   INDICATION: Signs/Symptoms:rule outt pneumothorax s/p thora.   COMPARISON: Chest CT scan 07/11/2025   ACCESSION NUMBER(S): [id number]   ORDERING CLINICIAN: DAVID JAMES   TECHNIQUE: 1 view of the chest was performed.   FINDINGS: Perihilar vascular congestion. Trace bibasal atelectasis. Small bilateral pleural effusion. No pneumothorax.  The cardiomediastinal silhouette is enlarged.       1. Cardiomegaly with perihilar vascular congestion, small bilateral pleural effusion and bibasal atelectasis.      CT chest abdomen pelvis w IV contrast  Result Date: 7/11/2025  STUDY: CT Chest, Abdomen, and Pelvis with IV Contrast, CT Thoracic Spine and Lumbar Spine without IV Contrast; 7/11/2025 10:53 AM INDICATION: Hypoxia.  Back pain.  Additional History:  Dementia. COMPARISON: None Available. ACCESSION NUMBER(S): [id number], [id number], [id number] ORDERING CLINICIAN: DANYEL TERRAZAS TECHNIQUE: CT of the chest, abdomen, and pelvis was performed.  Contiguous axial images were obtained at 3 mm slice thickness through the chest, abdomen, and pelvis.  Coronal and sagittal reconstructions at 3 mm slice thickness were performed.  Omnipaque 350 100 mL was administered intravenously.  Please note that spinal images were generated from the original CT abdomen and pelvis imaging.  FINDINGS: CHEST: MEDIASTINUM: The heart is enlarged with multichamber enlargement.  There is no evidence for pericardial effusion.  Coronary artery calcifications are present.   Central vascular structures opacify normally.  In the retrocardiac region, there is a small sliding hiatal hernia. LUNGS/PLEURA: Large right and small left pleural effusions layer dependently in the bilateral hemithoraces.  There is no pleural thickening or pneumothorax.  The airways are patent. Near-complete collapse and compressive atelectasis identified at the right lower lobe.  Subsegmental atelectasis identified at the right middle lobe and left lower lobe.  The lungs are otherwise clear without consolidation, interstitial disease, or suspicious nodules. LYMPH NODES: Thoracic lymph nodes are not enlarged. ABDOMEN:  LIVER: No hepatomegaly.  Smooth surface contour.  Normal attenuation.  BILE DUCTS: No intrahepatic or extrahepatic biliary ductal dilatation.  GALLBLADDER: The gallbladder is remarkable. STOMACH: No abnormalities identified.  PANCREAS: No masses or ductal dilatation.  SPLEEN: No splenomegaly or focal splenic lesion.  ADRENAL GLANDS: No thickening or nodules.  KIDNEYS AND URETERS: Kidneys are normal in size and location.  No renal or ureteral calculi.  PELVIS:  BLADDER: No abnormalities identified.  REPRODUCTIVE ORGANS: No abnormalities identified.  BOWEL: No abnormalities identified.  Appendix is well-visualized and is within normal limits.  VESSELS: No abnormalities identified.  Abdominal aorta is normal in caliber.  PERITONEUM/RETROPERITONEUM/LYMPH NODES: Trace free fluid in the pelvis represents peritoneal ascites.  No pneumoperitoneum. No lymphadenopathy.  ABDOMINAL WALL: No abnormalities identified. SOFT TISSUES: No abnormalities identified.  BONES: No acute fracture or aggressive osseous lesion. THORACIC SPINE: The alignment is anatomic.  There is no fracture or traumatic subluxation. The vertebral body heights are well maintained.  Disc spaces are preserved.  No significant central canal stenosis is demonstrated. The neural foramina are patent throughout.  The paravertebral soft tissues are  within normal limits. LUMBAR SPINE: Rightward convexity of the lumbar spine represents dextroscoliosis. There is no fracture or traumatic subluxation. The vertebral body heights are well maintained.  Intervertebral disc narrowing with vacuum disc phenomenon extends from T12-L1 through L5-S1.  Moderate right and mild left neural foraminal stenosis identified at L3-L4, L4-L5 and L5-S1. The paravertebral soft tissues are within normal limits.    1.  Large right and small left pleural effusions. 2.  Near-complete collapse and compressive atelectasis of the right lower lobe. 3.  Cardiomegaly with multichamber enlargement. 4.  Coronary artery calcifications. 5.  No evidence for acute fracture or traumatic subluxation of the thoracic or lumbar spine. 6.  Multilevel discogenic degenerative changes with mild scoliosis of the lumbar spine. 7.  Trace peritoneal ascites.     CT lumbar spine retrospective reconstruction protocol  Result Date: 7/11/2025  STUDY: CT Chest, Abdomen, and Pelvis with IV Contrast, CT Thoracic Spine and Lumbar Spine without IV Contrast; 7/11/2025 10:53 AM INDICATION: Hypoxia.  Back pain.  Additional History:  Dementia. COMPARISON: None Available. ACCESSION NUMBER(S): [id number], [id number], [id number] ORDERING CLINICIAN: DANYEL TERRAZAS TECHNIQUE: CT of the chest, abdomen, and pelvis was performed.  Contiguous axial images were obtained at 3 mm slice thickness through the chest, abdomen, and pelvis.  Coronal and sagittal reconstructions at 3 mm slice thickness were performed.  Omnipaque 350 100 mL was administered intravenously.  Please note that spinal images were generated from the original CT abdomen and pelvis imaging.  FINDINGS: CHEST: MEDIASTINUM: The heart is enlarged with multichamber enlargement.  There is no evidence for pericardial effusion.  Coronary artery calcifications are present.  Central vascular structures opacify normally.  In the retrocardiac region, there is a small sliding  hiatal hernia. LUNGS/PLEURA: Large right and small left pleural effusions layer dependently in the bilateral hemithoraces.  There is no pleural thickening or pneumothorax.  The airways are patent. Near-complete collapse and compressive atelectasis identified at the right lower lobe.  Subsegmental atelectasis identified at the right middle lobe and left lower lobe.  The lungs are otherwise clear without consolidation, interstitial disease, or suspicious nodules. LYMPH NODES: Thoracic lymph nodes are not enlarged. ABDOMEN:  LIVER: No hepatomegaly.  Smooth surface contour.  Normal attenuation.  BILE DUCTS: No intrahepatic or extrahepatic biliary ductal dilatation.  GALLBLADDER: The gallbladder is remarkable. STOMACH: No abnormalities identified.  PANCREAS: No masses or ductal dilatation.  SPLEEN: No splenomegaly or focal splenic lesion.  ADRENAL GLANDS: No thickening or nodules.  KIDNEYS AND URETERS: Kidneys are normal in size and location.  No renal or ureteral calculi.  PELVIS:  BLADDER: No abnormalities identified.  REPRODUCTIVE ORGANS: No abnormalities identified.  BOWEL: No abnormalities identified.  Appendix is well-visualized and is within normal limits.  VESSELS: No abnormalities identified.  Abdominal aorta is normal in caliber.  PERITONEUM/RETROPERITONEUM/LYMPH NODES: Trace free fluid in the pelvis represents peritoneal ascites.  No pneumoperitoneum. No lymphadenopathy.  ABDOMINAL WALL: No abnormalities identified. SOFT TISSUES: No abnormalities identified.  BONES: No acute fracture or aggressive osseous lesion. THORACIC SPINE: The alignment is anatomic.  There is no fracture or traumatic subluxation. The vertebral body heights are well maintained.  Disc spaces are preserved.  No significant central canal stenosis is demonstrated. The neural foramina are patent throughout.  The paravertebral soft tissues are within normal limits. LUMBAR SPINE: Rightward convexity of the lumbar spine represents  dextroscoliosis. There is no fracture or traumatic subluxation. The vertebral body heights are well maintained.  Intervertebral disc narrowing with vacuum disc phenomenon extends from T12-L1 through L5-S1.  Moderate right and mild left neural foraminal stenosis identified at L3-L4, L4-L5 and L5-S1. The paravertebral soft tissues are within normal limits.    1.  Large right and small left pleural effusions. 2.  Near-complete collapse and compressive atelectasis of the right lower lobe. 3.  Cardiomegaly with multichamber enlargement. 4.  Coronary artery calcifications. 5.  No evidence for acute fracture or traumatic subluxation of the thoracic or lumbar spine. 6.  Multilevel discogenic degenerative changes with mild scoliosis of the lumbar spine. 7.  Trace peritoneal ascites.     CT thoracic spine retrospective reconstruction protocol  Result Date: 7/11/2025  STUDY: CT Chest, Abdomen, and Pelvis with IV Contrast, CT Thoracic Spine and Lumbar Spine without IV Contrast; 7/11/2025 10:53 AM INDICATION: Hypoxia.  Back pain.  Additional History:  Dementia. COMPARISON: None Available. ACCESSION NUMBER(S): [id number], [id number], [id number] ORDERING CLINICIAN: DANYEL TERRAZAS TECHNIQUE: CT of the chest, abdomen, and pelvis was performed.  Contiguous axial images were obtained at 3 mm slice thickness through the chest, abdomen, and pelvis.  Coronal and sagittal reconstructions at 3 mm slice thickness were performed.  Omnipaque 350 100 mL was administered intravenously.  Please note that spinal images were generated from the original CT abdomen and pelvis imaging.  FINDINGS: CHEST: MEDIASTINUM: The heart is enlarged with multichamber enlargement.  There is no evidence for pericardial effusion.  Coronary artery calcifications are present.  Central vascular structures opacify normally.  In the retrocardiac region, there is a small sliding hiatal hernia. LUNGS/PLEURA: Large right and small left pleural effusions layer  dependently in the bilateral hemithoraces.  There is no pleural thickening or pneumothorax.  The airways are patent. Near-complete collapse and compressive atelectasis identified at the right lower lobe.  Subsegmental atelectasis identified at the right middle lobe and left lower lobe.  The lungs are otherwise clear without consolidation, interstitial disease, or suspicious nodules. LYMPH NODES: Thoracic lymph nodes are not enlarged. ABDOMEN:  LIVER: No hepatomegaly.  Smooth surface contour.  Normal attenuation.  BILE DUCTS: No intrahepatic or extrahepatic biliary ductal dilatation.  GALLBLADDER: The gallbladder is remarkable. STOMACH: No abnormalities identified.  PANCREAS: No masses or ductal dilatation.  SPLEEN: No splenomegaly or focal splenic lesion.  ADRENAL GLANDS: No thickening or nodules.  KIDNEYS AND URETERS: Kidneys are normal in size and location.  No renal or ureteral calculi.  PELVIS:  BLADDER: No abnormalities identified.  REPRODUCTIVE ORGANS: No abnormalities identified.  BOWEL: No abnormalities identified.  Appendix is well-visualized and is within normal limits.  VESSELS: No abnormalities identified.  Abdominal aorta is normal in caliber.  PERITONEUM/RETROPERITONEUM/LYMPH NODES: Trace free fluid in the pelvis represents peritoneal ascites.  No pneumoperitoneum. No lymphadenopathy.  ABDOMINAL WALL: No abnormalities identified. SOFT TISSUES: No abnormalities identified.  BONES: No acute fracture or aggressive osseous lesion. THORACIC SPINE: The alignment is anatomic.  There is no fracture or traumatic subluxation. The vertebral body heights are well maintained.  Disc spaces are preserved.  No significant central canal stenosis is demonstrated. The neural foramina are patent throughout.  The paravertebral soft tissues are within normal limits. LUMBAR SPINE: Rightward convexity of the lumbar spine represents dextroscoliosis. There is no fracture or traumatic subluxation. The vertebral body heights are  well maintained.  Intervertebral disc narrowing with vacuum disc phenomenon extends from T12-L1 through L5-S1.  Moderate right and mild left neural foraminal stenosis identified at L3-L4, L4-L5 and L5-S1. The paravertebral soft tissues are within normal limits.    1.  Large right and small left pleural effusions. 2.  Near-complete collapse and compressive atelectasis of the right lower lobe. 3.  Cardiomegaly with multichamber enlargement. 4.  Coronary artery calcifications. 5.  No evidence for acute fracture or traumatic subluxation of the thoracic or lumbar spine. 6.  Multilevel discogenic degenerative changes with mild scoliosis of the lumbar spine. 7.  Trace peritoneal ascites.     CT cervical spine wo IV contrast  Result Date: 7/11/2025  Interpreted By:  Charbel Smith, STUDY: CT CERVICAL SPINE WO IV CONTRAST;  7/11/2025 10:52 am   INDICATION: Signs/Symptoms:HIA.     COMPARISON: Prior exam is from 07/28/2024.   ACCESSION NUMBER(S): [id number]   ORDERING CLINICIAN: DANYEL TERRAZAS   TECHNIQUE: Thin section axial images were obtained from the skull base down through the thoracic inlet. Sagittal and coronal reconstruction images were generated. Soft tissue, lung, and bone windows were reviewed.   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 There is moderate disc space narrowing with endplate spurring at C3-4 and C6-7 with more mild changes at C4-5 and C5-6. There is trace anterolisthesis of C5 over C6 and mild retrolisthesis of C6 over C7. There is mild-to-moderate disc space narrowing with endplate spurring and vacuum disc phenomenon at C7-T1. There is uncovertebral hypertrophy with spurring at C3-4, on the left at C5-6, and bilaterally at C6-7. Multilevel interfacet hypertrophy with spur formation. 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 No cervical spine compression fracture. No posterior element fracture. No  destructive bone lesion.     SPINAL CANAL: No gross disc herniation.   NECK SOFT TISSUES: Within normal limits.   LUNG APICES: Moderate-sized right pleural effusion. No left pleural effusion. No apical pneumothorax or mass on either side..   SKULL BASE: Within normal limits.       DJD in the cervical spine as described.   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   No CT evidence of cervical spine fracture in this exam.   Moderate-sized right pleural effusion.      CT head wo IV contrast  Result Date: 7/11/2025  Interpreted By:  Charbel Smith, STUDY: CT HEAD WO IV CONTRAST;  7/11/2025 10:38 am   INDICATION: Signs/Symptoms:HIA.   COMPARISON: None.   ACCESSION NUMBER(S): [id number]   ORDERING CLINICIAN: DANYEL TERRAZAS   TECHNIQUE: Routine axial images were obtained from the skull base through the vertex.  Sagittal and coronal reconstruction images were generated. Brain, subdural, and bone windows were reviewed. N/A   N/A   FINDINGS: INTRACRANIAL: Mild prominence of ventricles and sulci.  There is cavum septum pellucidum and cavum vergae, an anatomic variant of normal. No acute intracranial bleed, midline shift, or focal mass effect. No destructive bone lesion. No depressed skull fracture. Skullbase arterial calcifications in the carotid siphons and vertebral arteries.   EXTRACRANIAL: Visualized paranasal sinuses were clear. Visualized mastoid air cells were clear. Stable old right medial wall orbital fracture deformity.       No depressed skull fracture. No acute intracranial bleed or focal mass effect.   Mild volume loss.       Assessment & Plan    Stella Agarwal is a 74 y.o. female w/ Hx of DVT on Eliquis, vascular dementia, multiple falls, HTN, GERD presenting w/ acute hypoxic respiratory failure 2/2 congestive heart failure exacerbation.    Acute Hypoxic Respiratory Failure 2/2 Congestive Heart Failure Exacerbation  :: s/p  thoracentesis 7/12 (700 mL removed)  :: No longer require supplemental O2; sat 94% on RA  Recommendations:  c/w incentive spirometry to improve lung function  Eliquis on hold per cardiology?  Rest of care per medicine team     Pulmonology will sign off now. Please reach out or contact us if needed/anything changes.    Kelli Peterson, DO  PGY-1         [1] apixaban, 2.5 mg, oral, BID  aspirin, 81 mg, oral, Daily  FLUoxetine, 30 mg, oral, Daily  furosemide, 40 mg, intravenous, BID  [Held by provider] losartan, 100 mg, oral, Daily  magnesium oxide, 400 mg of magnesium oxide, oral, Daily  nystatin, 1 Application, Topical, BID  OLANZapine, 10 mg, oral, Nightly  oxygen, , inhalation, Continuous - Inhalation  pantoprazole, 40 mg, oral, Daily before breakfast  perflutren lipid microspheres, 0.5-10 mL of dilution, intravenous, Once in imaging  polyethylene glycol, 17 g, oral, Daily     [2]    [3] PRN medications: levalbuterol

## 2025-07-15 NOTE — NURSING NOTE
Assumed care of patient. Patient is A&Ox2 and is resting in bed. Call light in reach. Patient is afib on monitor @ 73bpm. Patient denies pain.

## 2025-07-15 NOTE — CARE PLAN
Problem: Heart Failure  Goal: Improved gas exchange this shift  Outcome: Progressing  Goal: Improved urinary output this shift  Outcome: Progressing  Goal: Reduction in peripheral edema within 24 hours  Outcome: Progressing  Goal: Report improvement of dyspnea/breathlessness this shift  Outcome: Progressing  Goal: Weight from fluid excess reduced over 2-3 days, then stabilize  Outcome: Progressing  Goal: Increase self care and/or family involvement in 24 hours  Outcome: Progressing     Problem: Pain - Adult  Goal: Verbalizes/displays adequate comfort level or baseline comfort level  Outcome: Progressing     Problem: Safety - Adult  Goal: Free from fall injury  Outcome: Progressing     Problem: Discharge Planning  Goal: Discharge to home or other facility with appropriate resources  Outcome: Progressing     Problem: Chronic Conditions and Co-morbidities  Goal: Patient's chronic conditions and co-morbidity symptoms are monitored and maintained or improved  Outcome: Progressing     Problem: Nutrition  Goal: Nutrient intake appropriate for maintaining nutritional needs  Outcome: Progressing     Problem: Skin  Goal: Decreased wound size/increased tissue granulation at next dressing change  Outcome: Progressing  Goal: Participates in plan/prevention/treatment measures  Outcome: Progressing  Flowsheets (Taken 7/15/2025 1430)  Participates in plan/prevention/treatment measures:   Discuss with provider PT/OT consult   Elevate heels   Increase activity/out of bed for meals  Goal: Prevent/manage excess moisture  Outcome: Progressing  Goal: Prevent/minimize sheer/friction injuries  Outcome: Progressing  Goal: Promote/optimize nutrition  Outcome: Progressing  Goal: Promote skin healing  Outcome: Progressing   The patient's goals for the shift include      The clinical goals for the shift include pt will remain hds during shift and HR will be controlled    Over the shift, the patient did not make progress toward the following  goals. Barriers to progression include unstable heart rate. Recommendations to address these barriers include medication manamgement.

## 2025-07-15 NOTE — PROGRESS NOTES
Department of Hospital Medicine  Progress Note    Subjective     Stella Agarwal is a 74 y.o. female on Hospital Day: 5 of admission presenting with Acute systolic heart failure.    She is resting well today. Denies chest pain, SOB, abdominal pain, bowel issues, and urinary issues.     Objective   Vitals:  Vitals:    07/14/25 2036 07/14/25 2304 07/15/25 0347 07/15/25 0755   BP:  141/62 102/73 162/70   BP Location:    Left arm   Patient Position:    Lying   Pulse:  68 53 63   Resp:  20 20 18   Temp:  36.5 °C (97.7 °F) 36.3 °C (97.3 °F) 36.3 °C (97.3 °F)   TempSrc:    Temporal   SpO2: 98% 95% 97% 99%   Weight:   84.1 kg (185 lb 6.5 oz)    Height:           Intake/Output last 3 Shifts:  I/O last 3 completed shifts:  In: 300 (3.6 mL/kg) [P.O.:300]  Out: 1900 (22.6 mL/kg) [Urine:1900 (0.6 mL/kg/hr)]  Weight: 84.1 kg   No intake/output data recorded.    Last stool output         Physical Exam  Constitutional:       Appearance: Normal appearance. She is obese.   Cardiovascular:      Rate and Rhythm: Regular rhythm. Bradycardia present.      Pulses: Normal pulses.      Heart sounds: Normal heart sounds.   Pulmonary:      Breath sounds: Rhonchi present.   Abdominal:      General: There is distension.      Tenderness: There is abdominal tenderness.   Musculoskeletal:      Right lower leg: Edema present.      Left lower leg: Edema present.   Skin:     General: Skin is warm and dry.      Capillary Refill: Capillary refill takes less than 2 seconds.   Neurological:      General: No focal deficit present.      Mental Status: She is alert and oriented to person, place, and time.   Psychiatric:         Mood and Affect: Mood normal.         Behavior: Behavior normal.     ROS: 12 point review of systems is negative    LABS:    CBC:  Results from last 72 hours   Lab Units 07/15/25  0609 07/14/25  0656 07/13/25  0608   WBC AUTO x10*3/uL 7.1 7.8 8.4   RBC AUTO x10*6/uL 3.72* 3.80* 3.80*   HEMOGLOBIN g/dL 10.5* 10.8* 10.7*   HEMATOCRIT  "% 35.6* 35.7* 36.5   MCV fL 96 94 96   MCH pg 28.2 28.4 28.2   MCHC g/dL 29.5* 30.3* 29.3*   RDW % 15.1* 15.0* 15.1*   PLATELETS AUTO x10*3/uL 188 190 185     CMP:  Results from last 72 hours   Lab Units 07/15/25  0609 07/14/25  0655 07/13/25  0608   SODIUM mmol/L 144 142 141   POTASSIUM mmol/L 4.1 4.0 3.8   CHLORIDE mmol/L 98 97* 97*   CO2 mmol/L 40* 39* 37*   ANION GAP mmol/L 10 10 11   BUN mg/dL 12 15 17   CREATININE mg/dL 0.73 0.90 1.11*   EGFR mL/min/1.73m*2 87 68 53*   GLUCOSE mg/dL 91 96 96     Results from last 72 hours   Lab Units 07/15/25  0609 07/14/25  0655 07/13/25  0608   ALBUMIN g/dL 3.2* 3.4 3.2*     Calcium/Phos:   Results from last 72 hours   Lab Units 07/15/25  0609 07/14/25  0655 07/13/25  0608   CALCIUM mg/dL 8.5* 8.5* 8.0*   PHOSPHORUS mg/dL 2.9 2.8 4.3      COAG:     CRP:   Lab Results   Component Value Date    CRP 0.41 01/12/2022       ENDO:  Recent Labs     09/04/24  0723   TSH 2.51   HGBA1C 5.5      CARDIAC:       No lab exists for component: \"CK\", \"CKMBP\"    No data recorded    MICRO/ID:   No results found for the last 90 days.             Results from last 7 days   Lab Units 07/12/25  1348   FUNGAL CULTURE/SM, MISC  Culture in progress, a report will be issued when positive or after 2 weeks of incubation.   FUNGAL STAIN  No fungal elements seen      No results found for: \"URINECULTURE\", \"BLOODCULT\", \"CSFCULTSMEAR\"    IMAGING RESULTS:   No results found.    ALLERGIES PAST MEDICAL HISTORY PAST SURGICAL SURGERY FAMILY HISTORY SOCIAL HISTORY   Allergies[1] Medical History[2] Surgical History[3] Family History[4] Social History[5]         MEDS:  HOME MEDS SCHEDULED MEDS PRN IV MEDS   Current Outpatient Medications   Medication Instructions    acetaminophen (TYLENOL) 650 mg, oral, Every 4 hours PRN    alum-mag hydroxide-simeth (Mylanta) 200-200-20 mg/5 mL oral suspension 15 mL, oral, 2 times daily PRN    aspirin 81 mg EC tablet (1) TABLET BY MOUTH ONCE DAILY    benzonatate (TESSALON) 100 mg, " oral, 3 times daily PRN, Do not crush or chew.    bisacodyl (ONELAX BISACODYL) 10 mg, rectal, Daily PRN    diphenhydrAMINE (BENADRYL) 25 mg, oral, Every 6 hours PRN    donepezil (ARICEPT) 5 mg, oral, Nightly    Eliquis 2.5 mg, oral, 2 times daily    FLUoxetine (PROzac) 20 mg capsule (1) CAPSULE BY MOUTH EVERY MORNING FOR ANXIETY / DEPRESSION    FLUoxetine (PROZAC) 10 mg, oral, Every morning, Take with 20mg to equal 30mg    furosemide (Lasix) 20 mg tablet (1) TABLET BY MOUTH ONCE DAILY    hydrocortisone 1 % cream 1 Application, Topical, 4 times daily PRN    ibuprofen 400 mg, oral, Every 4 hours PRN    loperamide (IMODIUM) 2 mg, oral, Daily PRN    loratadine (CLARITIN) 10 mg, oral, Daily PRN    losartan (Cozaar) 100 mg tablet (1) TABLET BY MOUTH ONCE DAILY    magnesium hydroxide (Milk of Magnesia) 400 mg/5 mL suspension 30 mL, oral, Daily PRN    metoprolol succinate XL (Toprol-XL) 50 mg 24 hr tablet (1) TABLET BY MOUTH ONCE DAILY    OLANZapine (ZyPREXA) 10 mg tablet (1) TABLET BY MOUTH AT BEDTIME PSYCHOSIS    omeprazole (PRILOSEC) 40 mg, oral, Daily    sennosides (Senokot) 8.6 mg tablet 2 tablets, oral, 2 times daily PRN    sodium phosphates (Fleet Enema) 19-7 gram/118 mL enema enema 1 enema, rectal, Daily PRN    Scheduled Medications[6] PRN Medications[7] Continuous Medications[8]          Assessment/Plan   ASSESSMENT & PLAN  Stella Agarwal is a very pleasant 74 year old female with a history of DVT (Eliquis), vascular dementia, multiple falls, HTN and GERD, presents to the ER after a syncopal event. The syncope was not witnessed. Patient is a poor historian. This morning denies pain, unsure why she is in the hospital. Noted in the chart she was walking in her room at the nursing home with her walker and slipped. Her walker hit her in the face and fell backwards hitting her head on the floor. She denies any pain. Denies chest pain, heart palpitations or dizziness. Her daughter has noticed she has had shortness of  breath for the past three days. The daughter states she has had multiple falls over the past three months. The daughter states she has a cardiologist at [facility]. She had a period of worsening bradycardia and hypotension after her thoracentesis. Resting well three days after.     Pt was found in A-fib with RVR and HR of 130's at 1037AM. She was asymptomatic at the time. Her BP was 122/76. Her Chadsvasc score was 5. Will administer 5 mg IV metoprolol and Heparin drip. Will continue to follow.        ACUTE MEDICAL ISSUES  #Acute on chronic CHF exacerbation  #Pleural Effusion  -large right and small left pleural effusion on CT scan thoracentesis yesterday 700 ml of fluid removed chest x-ray showed small bilateral pleural effusion  -Repeat EKG on 07/15 shows afib w/ RVR  -Hold lasix 40 mg daily  -Continue Aspirin 81 mg daily  -Monitor I&O  -07/14 Net -950  -Monitor weight - 190 lbs on 07/11  -Current weight 185 lbs  -Restart Eliquis 2.5 mg daily  -TTE on 07/15 showed the left ventricular systolic function is normal with a visually estimated ejection fraction of 70-75%. There is normal right ventricular global systolic function.     #New onset Afib w/ RVR   #Sick Sinus Syndrome   - Will administer 5 mg IV metoprolol once for rate control, under 110  - 5 mg IV metoprolol PRN for HR >130  - Heparin drip for anticoagulation due to chadsvasc of 5  - Hold eliquis pending pacemaker  - Pacemaker planned for Thursday        #Junctional Bradycardia (Resolved)  -Recommend keeping patient off of AV lazaro blocking agents (adenosine, beta-blockers, calcium channel blockers (non-dihydropyridine), and digoxin if possible.    #Elevated troponin (Resolved)  -elevated troponin secondary to demand ischemia (resolved)      Delano Solano MD  Internal Medicine, PGY-1          [1]   Allergies  Allergen Reactions    Procaine Hcl Shortness of breath    Procaine Unknown   [2]   Past Medical History:  Diagnosis Date    Deep vein thrombosis (DVT) of  proximal vein of left lower extremity 09/18/2020    Schizophrenia 11/01/2013   [3] History reviewed. No pertinent surgical history.  [4] No family history on file.  [5]   Social History  Tobacco Use    Smoking status: Never    Smokeless tobacco: Never   [6] [Held by provider] apixaban, 2.5 mg, oral, BID  aspirin, 81 mg, oral, Daily  [Held by provider] donepezil, 5 mg, oral, Nightly  enoxaparin, 40 mg, subcutaneous, q24h  FLUoxetine, 30 mg, oral, Daily  furosemide, 40 mg, intravenous, Daily  [Held by provider] losartan, 100 mg, oral, Daily  magnesium oxide, 400 mg of magnesium oxide, oral, Daily  nystatin, 1 Application, Topical, BID  OLANZapine, 10 mg, oral, Nightly  oxygen, , inhalation, Continuous - Inhalation  pantoprazole, 40 mg, oral, Daily before breakfast  perflutren lipid microspheres, 0.5-10 mL of dilution, intravenous, Once in imaging  polyethylene glycol, 17 g, oral, Daily  [7] PRN medications: levalbuterol  [8]

## 2025-07-16 ENCOUNTER — APPOINTMENT (OUTPATIENT)
Dept: CARDIOLOGY | Facility: HOSPITAL | Age: 75
DRG: 242 | End: 2025-07-16
Payer: COMMERCIAL

## 2025-07-16 LAB
ACID FAST STN SPEC: NORMAL
ALBUMIN SERPL BCP-MCNC: 3.3 G/DL (ref 3.4–5)
ANION GAP BLDV CALCULATED.4IONS-SCNC: 5 MMOL/L (ref 10–25)
ANION GAP SERPL CALC-SCNC: 12 MMOL/L (ref 10–20)
ATRIAL RATE: 48 BPM
BACTERIA FLD CULT: NORMAL
BASE EXCESS BLDV CALC-SCNC: 12.5 MMOL/L (ref -2–3)
BODY TEMPERATURE: ABNORMAL
BUN SERPL-MCNC: 12 MG/DL (ref 6–23)
CA-I BLDV-SCNC: 1.14 MMOL/L (ref 1.1–1.33)
CALCIUM SERPL-MCNC: 8.7 MG/DL (ref 8.6–10.3)
CHLORIDE BLDV-SCNC: 99 MMOL/L (ref 98–107)
CHLORIDE SERPL-SCNC: 97 MMOL/L (ref 98–107)
CO2 SERPL-SCNC: 37 MMOL/L (ref 21–32)
CREAT SERPL-MCNC: 0.69 MG/DL (ref 0.5–1.05)
EGFRCR SERPLBLD CKD-EPI 2021: >90 ML/MIN/1.73M*2
ERYTHROCYTE [DISTWIDTH] IN BLOOD BY AUTOMATED COUNT: 15.3 % (ref 11.5–14.5)
GLUCOSE BLDV-MCNC: 112 MG/DL (ref 74–99)
GLUCOSE SERPL-MCNC: 95 MG/DL (ref 74–99)
GRAM STN SPEC: NORMAL
GRAM STN SPEC: NORMAL
HCO3 BLDV-SCNC: 37.4 MMOL/L (ref 22–26)
HCT VFR BLD AUTO: 35.5 % (ref 36–46)
HCT VFR BLD EST: 37 % (ref 36–46)
HGB BLD-MCNC: 10.9 G/DL (ref 12–16)
HGB BLDV-MCNC: 12.4 G/DL (ref 12–16)
INHALED O2 CONCENTRATION: 24 %
LACTATE BLDV-SCNC: 1.1 MMOL/L (ref 0.4–2)
MAGNESIUM SERPL-MCNC: 1.95 MG/DL (ref 1.6–2.4)
MCH RBC QN AUTO: 28.7 PG (ref 26–34)
MCHC RBC AUTO-ENTMCNC: 30.7 G/DL (ref 32–36)
MCV RBC AUTO: 93 FL (ref 80–100)
MYCOBACTERIUM SPEC CULT: NORMAL
NRBC BLD-RTO: 0 /100 WBCS (ref 0–0)
OXYHGB MFR BLDV: 88.4 % (ref 45–75)
PCO2 BLDV: 48 MM HG (ref 41–51)
PH BLDV: 7.5 PH (ref 7.33–7.43)
PHOSPHATE SERPL-MCNC: 2.3 MG/DL (ref 2.5–4.9)
PLATELET # BLD AUTO: 205 X10*3/UL (ref 150–450)
PO2 BLDV: 57 MM HG (ref 35–45)
POTASSIUM BLDV-SCNC: 3.9 MMOL/L (ref 3.5–5.3)
POTASSIUM SERPL-SCNC: 3.8 MMOL/L (ref 3.5–5.3)
PR INTERVAL: 152 MS
Q ONSET: 212 MS
QRS COUNT: 13 BEATS
QRS DURATION: 76 MS
QT INTERVAL: 396 MS
QTC CALCULATION(BAZETT): 445 MS
QTC FREDERICIA: 428 MS
R AXIS: -89 DEGREES
RBC # BLD AUTO: 3.8 X10*6/UL (ref 4–5.2)
SAO2 % BLDV: 91 % (ref 45–75)
SODIUM BLDV-SCNC: 137 MMOL/L (ref 136–145)
SODIUM SERPL-SCNC: 142 MMOL/L (ref 136–145)
T AXIS: 3 DEGREES
T OFFSET: 410 MS
UFH PPP CHRO-ACNC: 0.8 IU/ML (ref ?–1.1)
UFH PPP CHRO-ACNC: 0.9 IU/ML (ref ?–1.1)
UFH PPP CHRO-ACNC: 0.9 IU/ML (ref ?–1.1)
UFH PPP CHRO-ACNC: 1 IU/ML (ref ?–1.1)
UFH PPP CHRO-ACNC: 1.5 IU/ML (ref ?–1.1)
VENTRICULAR RATE: 76 BPM
WBC # BLD AUTO: 8 X10*3/UL (ref 4.4–11.3)

## 2025-07-16 PROCEDURE — 83735 ASSAY OF MAGNESIUM: CPT

## 2025-07-16 PROCEDURE — 2500000002 HC RX 250 W HCPCS SELF ADMINISTERED DRUGS (ALT 637 FOR MEDICARE OP, ALT 636 FOR OP/ED)

## 2025-07-16 PROCEDURE — 80069 RENAL FUNCTION PANEL: CPT

## 2025-07-16 PROCEDURE — 97535 SELF CARE MNGMENT TRAINING: CPT | Mod: GO,CO

## 2025-07-16 PROCEDURE — 99232 SBSQ HOSP IP/OBS MODERATE 35: CPT

## 2025-07-16 PROCEDURE — 2500000005 HC RX 250 GENERAL PHARMACY W/O HCPCS: Performed by: STUDENT IN AN ORGANIZED HEALTH CARE EDUCATION/TRAINING PROGRAM

## 2025-07-16 PROCEDURE — 84132 ASSAY OF SERUM POTASSIUM: CPT

## 2025-07-16 PROCEDURE — 2500000001 HC RX 250 WO HCPCS SELF ADMINISTERED DRUGS (ALT 637 FOR MEDICARE OP)

## 2025-07-16 PROCEDURE — 97530 THERAPEUTIC ACTIVITIES: CPT | Mod: GO,CO

## 2025-07-16 PROCEDURE — 2500000004 HC RX 250 GENERAL PHARMACY W/ HCPCS (ALT 636 FOR OP/ED)

## 2025-07-16 PROCEDURE — 85520 HEPARIN ASSAY: CPT | Performed by: STUDENT IN AN ORGANIZED HEALTH CARE EDUCATION/TRAINING PROGRAM

## 2025-07-16 PROCEDURE — 85027 COMPLETE CBC AUTOMATED: CPT

## 2025-07-16 PROCEDURE — 2500000004 HC RX 250 GENERAL PHARMACY W/ HCPCS (ALT 636 FOR OP/ED): Performed by: BEHAVIOR TECHNICIAN

## 2025-07-16 PROCEDURE — 36415 COLL VENOUS BLD VENIPUNCTURE: CPT

## 2025-07-16 PROCEDURE — 85520 HEPARIN ASSAY: CPT

## 2025-07-16 PROCEDURE — 97110 THERAPEUTIC EXERCISES: CPT | Mod: GP

## 2025-07-16 PROCEDURE — 94760 N-INVAS EAR/PLS OXIMETRY 1: CPT

## 2025-07-16 PROCEDURE — 1200000002 HC GENERAL ROOM WITH TELEMETRY DAILY

## 2025-07-16 PROCEDURE — 93005 ELECTROCARDIOGRAM TRACING: CPT

## 2025-07-16 PROCEDURE — 99233 SBSQ HOSP IP/OBS HIGH 50: CPT

## 2025-07-16 RX ORDER — FUROSEMIDE 20 MG/1
20 TABLET ORAL DAILY
Status: DISCONTINUED | OUTPATIENT
Start: 2025-07-16 | End: 2025-07-23 | Stop reason: HOSPADM

## 2025-07-16 RX ORDER — POTASSIUM CHLORIDE 20 MEQ/1
20 TABLET, EXTENDED RELEASE ORAL DAILY
Status: DISCONTINUED | OUTPATIENT
Start: 2025-07-16 | End: 2025-07-23 | Stop reason: HOSPADM

## 2025-07-16 RX ADMIN — ASPIRIN 81 MG: 81 TABLET, DELAYED RELEASE ORAL at 08:25

## 2025-07-16 RX ADMIN — Medication 1 L/MIN: at 08:28

## 2025-07-16 RX ADMIN — HEPARIN SODIUM 1300 UNITS/HR: 10000 INJECTION, SOLUTION INTRAVENOUS at 06:19

## 2025-07-16 RX ADMIN — Medication 1 TABLET: at 08:25

## 2025-07-16 RX ADMIN — PANTOPRAZOLE SODIUM 40 MG: 40 TABLET, DELAYED RELEASE ORAL at 05:22

## 2025-07-16 RX ADMIN — HEPARIN SODIUM 1100 UNITS/HR: 10000 INJECTION, SOLUTION INTRAVENOUS at 11:04

## 2025-07-16 RX ADMIN — FLUOXETINE HYDROCHLORIDE 30 MG: 10 CAPSULE ORAL at 08:26

## 2025-07-16 RX ADMIN — Medication 21 PERCENT: at 19:33

## 2025-07-16 RX ADMIN — METOPROLOL TARTRATE 5 MG: 5 INJECTION INTRAVENOUS at 11:44

## 2025-07-16 RX ADMIN — NYSTATIN 1 APPLICATION: 100000 POWDER TOPICAL at 08:31

## 2025-07-16 RX ADMIN — POTASSIUM CHLORIDE 20 MEQ: 20 TABLET, EXTENDED RELEASE ORAL at 14:06

## 2025-07-16 RX ADMIN — OLANZAPINE 10 MG: 5 TABLET, FILM COATED ORAL at 20:33

## 2025-07-16 RX ADMIN — Medication 1 L/MIN: at 10:54

## 2025-07-16 RX ADMIN — FUROSEMIDE 20 MG: 20 TABLET ORAL at 14:06

## 2025-07-16 RX ADMIN — NYSTATIN 1 APPLICATION: 100000 POWDER TOPICAL at 20:38

## 2025-07-16 ASSESSMENT — PAIN SCALES - GENERAL
PAINLEVEL_OUTOF10: 0 - NO PAIN

## 2025-07-16 ASSESSMENT — COGNITIVE AND FUNCTIONAL STATUS - GENERAL
MOVING FROM LYING ON BACK TO SITTING ON SIDE OF FLAT BED WITH BEDRAILS: A LITTLE
TOILETING: A LITTLE
EATING MEALS: A LITTLE
MOVING TO AND FROM BED TO CHAIR: A LITTLE
STANDING UP FROM CHAIR USING ARMS: A LITTLE
WALKING IN HOSPITAL ROOM: TOTAL
PERSONAL GROOMING: A LITTLE
CLIMB 3 TO 5 STEPS WITH RAILING: A LOT
MOVING TO AND FROM BED TO CHAIR: A LOT
STANDING UP FROM CHAIR USING ARMS: A LOT
HELP NEEDED FOR BATHING: A LITTLE
TURNING FROM BACK TO SIDE WHILE IN FLAT BAD: A LITTLE
MOVING FROM LYING ON BACK TO SITTING ON SIDE OF FLAT BED WITH BEDRAILS: A LITTLE
WALKING IN HOSPITAL ROOM: A LOT
HELP NEEDED FOR BATHING: A LITTLE
EATING MEALS: A LITTLE
TOILETING: A LITTLE
TURNING FROM BACK TO SIDE WHILE IN FLAT BAD: A LITTLE
DRESSING REGULAR LOWER BODY CLOTHING: A LOT
DAILY ACTIVITIY SCORE: 17
TOILETING: A LITTLE
DAILY ACTIVITIY SCORE: 17
MOBILITY SCORE: 16
DRESSING REGULAR UPPER BODY CLOTHING: A LITTLE
DRESSING REGULAR UPPER BODY CLOTHING: A LITTLE
HELP NEEDED FOR BATHING: A LITTLE
MOBILITY SCORE: 12
MOBILITY SCORE: 16
CLIMB 3 TO 5 STEPS WITH RAILING: TOTAL
MOVING FROM LYING ON BACK TO SITTING ON SIDE OF FLAT BED WITH BEDRAILS: A LITTLE
WALKING IN HOSPITAL ROOM: A LOT
STANDING UP FROM CHAIR USING ARMS: A LITTLE
DRESSING REGULAR LOWER BODY CLOTHING: A LOT
PERSONAL GROOMING: A LITTLE
DRESSING REGULAR LOWER BODY CLOTHING: A LOT
CLIMB 3 TO 5 STEPS WITH RAILING: A LOT
MOVING TO AND FROM BED TO CHAIR: A LITTLE
TURNING FROM BACK TO SIDE WHILE IN FLAT BAD: A LITTLE
DAILY ACTIVITIY SCORE: 19
PERSONAL GROOMING: A LITTLE

## 2025-07-16 ASSESSMENT — PAIN - FUNCTIONAL ASSESSMENT
PAIN_FUNCTIONAL_ASSESSMENT: 0-10

## 2025-07-16 ASSESSMENT — ENCOUNTER SYMPTOMS
GASTROINTESTINAL NEGATIVE: 1
RESPIRATORY NEGATIVE: 1
PSYCHIATRIC NEGATIVE: 1
NEUROLOGICAL NEGATIVE: 1
EYES NEGATIVE: 1
CONSTITUTIONAL NEGATIVE: 1
CARDIOVASCULAR NEGATIVE: 1
ENDOCRINE NEGATIVE: 1
MUSCULOSKELETAL NEGATIVE: 1

## 2025-07-16 ASSESSMENT — ACTIVITIES OF DAILY LIVING (ADL): HOME_MANAGEMENT_TIME_ENTRY: 14

## 2025-07-16 NOTE — CARE PLAN
Problem: Heart Failure  Goal: Improved gas exchange this shift  Outcome: Progressing  Goal: Improved urinary output this shift  Outcome: Progressing  Goal: Reduction in peripheral edema within 24 hours  Outcome: Progressing  Goal: Report improvement of dyspnea/breathlessness this shift  Outcome: Progressing  Goal: Weight from fluid excess reduced over 2-3 days, then stabilize  Outcome: Progressing  Goal: Increase self care and/or family involvement in 24 hours  Outcome: Progressing     Problem: Pain - Adult  Goal: Verbalizes/displays adequate comfort level or baseline comfort level  Outcome: Progressing     Problem: Safety - Adult  Goal: Free from fall injury  Outcome: Progressing     Problem: Discharge Planning  Goal: Discharge to home or other facility with appropriate resources  Outcome: Progressing     Problem: Chronic Conditions and Co-morbidities  Goal: Patient's chronic conditions and co-morbidity symptoms are monitored and maintained or improved  Outcome: Progressing     Problem: Nutrition  Goal: Nutrient intake appropriate for maintaining nutritional needs  Outcome: Progressing     Problem: Skin  Goal: Decreased wound size/increased tissue granulation at next dressing change  Outcome: Progressing  Goal: Participates in plan/prevention/treatment measures  Outcome: Progressing  Flowsheets (Taken 7/16/2025 1816)  Participates in plan/prevention/treatment measures:   Elevate heels   Increase activity/out of bed for meals  Goal: Prevent/manage excess moisture  Outcome: Progressing  Goal: Prevent/minimize sheer/friction injuries  Outcome: Progressing  Goal: Promote/optimize nutrition  Outcome: Progressing  Goal: Promote skin healing  Outcome: Progressing

## 2025-07-16 NOTE — PROGRESS NOTES
Occupational Therapy    Occupational Therapy Treatment    Name: Stella Agarwal  MRN: [id number]  Department: [address]  Room: [address] [place] [address]  Date: 07/16/25  Time Calculation  Start Time: 1105  Stop Time: 1129  Time Calculation (min): 24 min    Assessment:  OT Assessment: Pt continues to present with decreased endurance/activity tolerance impacting ADL performance and functional mobility. Continue to recomend low intensity OT services to maximize pt safety and independence after discharge.  Prognosis: Good  Barriers to Discharge Home: No anticipated barriers  Evaluation/Treatment Tolerance: Patient tolerated treatment well  Medical Staff Made Aware: Yes  End of Session Communication: Bedside nurse  End of Session Patient Position: Up in chair, Alarm on  Plan:  Treatment Interventions: ADL retraining, Functional transfer training, Endurance training  OT Frequency: 2 times per week  OT Discharge Recommendations: Low intensity level of continued care  Equipment Recommended upon Discharge: Wheeled walker (owns)  OT Recommended Transfer Status: Assist of 1  OT - OK to Discharge: Yes (In accord with OT POC)    Subjective     OT Visit Info:  OT Received On: 07/16/25  General:  General  Reason for Referral: Impaired ADL and related  mobility; heart failure  Referred By: Mustapha Paz  Past Medical History Relevant to Rehab: DVT on life-long eliquis, vascular dementia, multiple falls, HTN, and GERD  Family/Caregiver Present: No  Prior to Session Communication: Bedside nurse, Care Coordinator  Patient Position Received: Bed, 3 rail up, Alarm on  Preferred Learning Style: verbal  General Comment: Pt pleasant and cooperative, slightly increased time needed for processing instruction provided in course of session.  Precautions:  Medical Precautions: Fall precautions (telemetry, purewick external catheter. 1 liter O2 via nasal cannula, IV.)    Pain Assessment:  Pain Assessment  Pain Assessment: 0-10  0-10 (Numeric) Pain Score: 0 - No  pain    Objective   Cognition:  Overall Cognitive Status: Within Functional Limits  Arousal/Alertness: Appropriate responses to stimuli  Orientation Level: Disoriented to situation  Processing Speed: Delayed  Activities of Daily Living: LE Dressing  LE Dressing: Yes  LE Dressing Adaptive Equipment: Reacher, Sock aide, Dressing stick  Pants Level of Assistance: Setup, Contact guard  Sock Level of Assistance: Setup, Minimum assistance, Minimal verbal cues (Cues needed for tool use with socks and increased time.)  Adult Briefs Level of Assistance: Moderate assistance, Moderate verbal cues  LE Dressing Where Assessed: Edge of bed  LE Dressing Comments: Increased time required for activities with slowed pace d/t fatigue within activity.    Toileting  Toileting Level of Assistance: Minimum assistance  Where Assessed: Other (Comment) (standing bedside)  Toileting Comments: Assist needed for clean-up and for cothing management at edge of bed s/p incont of urine.    Bed Mobility/Transfers: Bed Mobility  Bed Mobility: Yes  Bed Mobility 1  Bed Mobility 1: Rolling right, Supine to sitting  Level of Assistance 1: Contact guard (used bed rail with HOB ~30 degrees.)  Bed Mobility Comments 1: Increased time on task with assist for B LE's needed.    Transfers  Transfer: Yes  Transfer 1  Transfer From 1: Bed to  Transfer to 1: Stand  Technique 1: Sit to stand, Stand to sit  Transfer Device 1: Walker  Transfer Level of Assistance 1: Minimum assistance, Minimal verbal cues  Trials/Comments 1: reminders for safe hand placement in transitions required.  Transfers 2  Transfer From 2: Stand to  Transfer to 2: Chair with arms  Technique 2: Stand to sit  Transfer Device 2: Walker  Transfer Level of Assistance 2: Minimum assistance  Trials/Comments 2: Touch guidance for centering with chair seat prior to sitting, hands to arms of chair from walker and to slow descent when sitting.     Outcome Measures:  [facility] Daily Activity  Putting on and  taking off regular lower body clothing: A lot  Bathing (including washing, rinsing, drying): A little  Putting on and taking off regular upper body clothing: None  Toileting, which includes using toilet, bedpan or urinal: A little  Taking care of personal grooming such as brushing teeth: A little  Eating Meals: None  Daily Activity - Total Score: 19    Education Documentation  Body Mechanics, taught by SAMIA Umaña at 7/16/2025 12:04 PM.  Learner: Patient  Readiness: Acceptance  Method: Explanation, Demonstration  Response: Verbalizes Understanding, Demonstrated Understanding, Needs Reinforcement  Comment: Pt re-educated safety in ADL and mobility components as well as energy conservation and work simplification and use of adaptive techniques. Pt demonstrated good compliance to instruction. Repetition indicated.    Precautions, taught by SAMIA Umaña at 7/16/2025 12:04 PM.  Learner: Patient  Readiness: Acceptance  Method: Explanation, Demonstration  Response: Verbalizes Understanding, Demonstrated Understanding, Needs Reinforcement  Comment: Pt re-educated safety in ADL and mobility components as well as energy conservation and work simplification and use of adaptive techniques. Pt demonstrated good compliance to instruction. Repetition indicated.    ADL Training, taught by SAMIA Umaña at 7/16/2025 12:04 PM.  Learner: Patient  Readiness: Acceptance  Method: Explanation, Demonstration  Response: Verbalizes Understanding, Demonstrated Understanding, Needs Reinforcement  Comment: Pt re-educated safety in ADL and mobility components as well as energy conservation and work simplification and use of adaptive techniques. Pt demonstrated good compliance to instruction. Repetition indicated.    Goals:  Encounter Problems       Encounter Problems (Active)       OT Goals       Pt will tolerate 10min stand during functional task completion with no more than 1 rest break in order to increase endurance for  functional task completion.  (Progressing)       Start:  07/12/25    Expected End:  07/26/25            Pt will complete tdek-gr-wrib transfers using LRD in preparation for ADLs with SBA  (Progressing)       Start:  07/12/25    Expected End:  07/26/25            Pt will increase endurance to tolerate 15min of OOB activity with no more than 1 rest break in order to increase ability to engage in ADL completion.  (Progressing)       Start:  07/12/25    Expected End:  07/26/25            Pt will demo LE ADL completion with supervision, using AE if needed.  (Progressing)       Start:  07/12/25    Expected End:  07/26/25            Pt will complete ileana hygiene and clothing management during toileting ADL with SBA, using DME and adaptive strategies as neccesary  (Progressing)       Start:  07/12/25    Expected End:  07/26/25

## 2025-07-16 NOTE — PROGRESS NOTES
"Subjective    Yesterday patient went in to a-fib with RVR which resolved with 5 mg IV metoprolol.     This morning patient desatted to mid 80s. On evaluation patient was seen apneic during sleep. Patient was placed on 2 L NC and episode resolved.     This morning, patient reported feeling \"fine.\" Patient denies any SOB, chest pain, abdominal pain, constipation, diarrhea, or dysuria.       Objective    Temp:  [36.2 °C (97.2 °F)-36.6 °C (97.9 °F)] 36.2 °C (97.2 °F)  Heart Rate:  [] 116  Resp:  [16-20] 17  BP: (118-156)/(48-98) 141/98    Physical Exam  Constitutional:       Appearance: Normal appearance.     Cardiovascular:      Rate and Rhythm: Normal rate.      Heart sounds: Normal heart sounds. No murmur heard.  Pulmonary:      Effort: Pulmonary effort is normal.      Breath sounds: Normal breath sounds. No wheezing or rales.   Abdominal:      General: Abdomen is flat. There is no distension.      Palpations: Abdomen is soft. There is no mass.      Tenderness: There is no abdominal tenderness.     Musculoskeletal:      Right lower leg: Edema (1+ pitting edema to mid shin) present.      Left lower leg: Edema (1+ pitting edema to mid shin) present.     Neurological:      General: No focal deficit present.      Mental Status: She is alert. Mental status is at baseline.     Psychiatric:         Mood and Affect: Mood normal.         Behavior: Behavior normal.         Scheduled medications  Scheduled Medications[1]  Continuous medications  Continuous Medications[2]  PRN medications  PRN Medications[3]    Results for orders placed or performed during the hospital encounter of 07/11/25 (from the past 24 hours)   Heparin Assay, UFH   Result Value Ref Range    Heparin Unfractionated 2.0 (HH) See Comment Below for Therapeutic Ranges IU/mL   Heparin Assay, UFH   Result Value Ref Range    Heparin Unfractionated 1.4 (HH) See Comment Below for Therapeutic Ranges IU/mL   Heparin Assay, UFH   Result Value Ref Range    Heparin " Unfractionated 0.5 See Comment Below for Therapeutic Ranges IU/mL   Heparin Assay, UFH   Result Value Ref Range    Heparin Unfractionated 1.0 See Comment Below for Therapeutic Ranges IU/mL   Magnesium   Result Value Ref Range    Magnesium 1.95 1.60 - 2.40 mg/dL   Renal Function Panel   Result Value Ref Range    Glucose 95 74 - 99 mg/dL    Sodium 142 136 - 145 mmol/L    Potassium 3.8 3.5 - 5.3 mmol/L    Chloride 97 (L) 98 - 107 mmol/L    Bicarbonate 37 (H) 21 - 32 mmol/L    Anion Gap 12 10 - 20 mmol/L    Urea Nitrogen 12 6 - 23 mg/dL    Creatinine 0.69 0.50 - 1.05 mg/dL    eGFR >90 >60 mL/min/1.73m*2    Calcium 8.7 8.6 - 10.3 mg/dL    Phosphorus 2.3 (L) 2.5 - 4.9 mg/dL    Albumin 3.3 (L) 3.4 - 5.0 g/dL   CBC   Result Value Ref Range    WBC 8.0 4.4 - 11.3 x10*3/uL    nRBC 0.0 0.0 - 0.0 /100 WBCs    RBC 3.80 (L) 4.00 - 5.20 x10*6/uL    Hemoglobin 10.9 (L) 12.0 - 16.0 g/dL    Hematocrit 35.5 (L) 36.0 - 46.0 %    MCV 93 80 - 100 fL    MCH 28.7 26.0 - 34.0 pg    MCHC 30.7 (L) 32.0 - 36.0 g/dL    RDW 15.3 (H) 11.5 - 14.5 %    Platelets 205 150 - 450 x10*3/uL   Heparin Assay   Result Value Ref Range    Heparin Unfractionated 1.5 (HH) See Comment Below for Therapeutic Ranges IU/mL   Blood Gas Venous Full Panel   Result Value Ref Range    POCT pH, Venous 7.50 (H) 7.33 - 7.43 pH    POCT pCO2, Venous 48 41 - 51 mm Hg    POCT pO2, Venous 57 (H) 35 - 45 mm Hg    POCT SO2, Venous 91 (H) 45 - 75 %    POCT Oxy Hemoglobin, Venous 88.4 (H) 45.0 - 75.0 %    POCT Hematocrit Calculated, Venous 37.0 36.0 - 46.0 %    POCT Sodium, Venous 137 136 - 145 mmol/L    POCT Potassium, Venous 3.9 3.5 - 5.3 mmol/L    POCT Chloride, Venous 99 98 - 107 mmol/L    POCT Ionized Calicum, Venous 1.14 1.10 - 1.33 mmol/L    POCT Glucose, Venous 112 (H) 74 - 99 mg/dL    POCT Lactate, Venous 1.1 0.4 - 2.0 mmol/L    POCT Base Excess, Venous 12.5 (H) -2.0 - 3.0 mmol/L    POCT HCO3 Calculated, Venous 37.4 (H) 22.0 - 26.0 mmol/L    POCT Hemoglobin, Venous 12.4 12.0  - 16.0 g/dL    POCT Anion Gap, Venous 5.0 (L) 10.0 - 25.0 mmol/L    Patient Temperature      FiO2 24 %   ECG 12 lead   Result Value Ref Range    Ventricular Rate 76 BPM    Atrial Rate 48 BPM    DE Interval 152 ms    QRS Duration 76 ms    QT Interval 396 ms    QTC Calculation(Bazett) 445 ms    R Axis -89 degrees    T Axis 3 degrees    QRS Count 13 beats    Q Onset 212 ms    T Offset 410 ms    QTC Fredericia 428 ms   Heparin Assay   Result Value Ref Range    Heparin Unfractionated 0.9 See Comment Below for Therapeutic Ranges IU/mL       Imaging  XR chest 1 view  Result Date: 7/12/2025  1. Cardiomegaly with perihilar vascular congestion, small bilateral pleural effusion and bibasal atelectasis.   Signed by: Jaleel Godinez 7/12/2025 3:48 PM Dictation workstation:   [id number]    CT chest abdomen pelvis w IV contrast  Result Date: 7/11/2025  1.  Large right and small left pleural effusions. 2.  Near-complete collapse and compressive atelectasis of the right lower lobe. 3.  Cardiomegaly with multichamber enlargement. 4.  Coronary artery calcifications. 5.  No evidence for acute fracture or traumatic subluxation of the thoracic or lumbar spine. 6.  Multilevel discogenic degenerative changes with mild scoliosis of the lumbar spine. 7.  Trace peritoneal ascites. Signed by Gabino Chavez MD    CT lumbar spine retrospective reconstruction protocol  Result Date: 7/11/2025  1.  Large right and small left pleural effusions. 2.  Near-complete collapse and compressive atelectasis of the right lower lobe. 3.  Cardiomegaly with multichamber enlargement. 4.  Coronary artery calcifications. 5.  No evidence for acute fracture or traumatic subluxation of the thoracic or lumbar spine. 6.  Multilevel discogenic degenerative changes with mild scoliosis of the lumbar spine. 7.  Trace peritoneal ascites. Signed by Gabino Chavez MD    CT thoracic spine retrospective reconstruction protocol  Result Date: 7/11/2025  1.  Large right and  small left pleural effusions. 2.  Near-complete collapse and compressive atelectasis of the right lower lobe. 3.  Cardiomegaly with multichamber enlargement. 4.  Coronary artery calcifications. 5.  No evidence for acute fracture or traumatic subluxation of the thoracic or lumbar spine. 6.  Multilevel discogenic degenerative changes with mild scoliosis of the lumbar spine. 7.  Trace peritoneal ascites. Signed by Gabino Chavez MD    CT cervical spine wo IV contrast  Result Date: 7/11/2025  DJD in the cervical spine as described.   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   No CT evidence of cervical spine fracture in this exam.   Moderate-sized right pleural effusion.   MACRO: None   Signed by: Charbel Smith 7/11/2025 10:59 AM Dictation workstation:   [id number]    CT head wo IV contrast  Result Date: 7/11/2025  No depressed skull fracture. No acute intracranial bleed or focal mass effect.   Mild volume loss.     MACRO: None   Signed by: Charbel Smith 7/11/2025 10:51 AM Dictation workstation:   [id number]      Cardiology, Vascular, and Other Imaging    ECG 12 lead  Result Date: 7/16/2025  Poor data quality, interpretation may be adversely affected Sinus bradycardia with Premature supraventricular complexes Left axis deviation Possible Inferior infarct (cited on or before 11-JUL-2025) Abnormal ECG When compared with ECG of 15-JUL-2025 12:27, (unconfirmed) Significant changes have occurred Sinus rhythm has replaced Atrial fibrillation Confirmed by Avtar Alves ([id number]) on 7/16/2025 11:05:02 AM    Transthoracic Echo Complete  Result Date: 7/15/2025   [facility], [address] [address]               Tel [phone] and Fax [phone] TRANSTHORACIC ECHOCARDIOGRAM REPORT  Patient Name:       JAKE Muñiz Physician:    [id number] Avtar                                                                Joselyn COLINDRES Study Date:         7/15/2025           Ordering Provider:    [id number] DAVID GONZALEZ                                                               JAMES MRN/PID:            [id number]            Fellow: Accession#:         [id number]        Nurse: Date of Birth/Age:  1950 / 74      Sonographer:          Comfort jarquin RDCS Gender assigned at  F                   Additional Staff: Birth: Height:             152.40 cm           Admit Date:           7/11/2025 Weight:             84.82 kg            Admission Status:     Inpatient -                                                               Routine BSA / BMI:          1.81 m2 / 36.52     Encounter#:           [id number]                     kg/m2 Blood Pressure:     119/68 mmHg         Department Location:  [facility]                                                               Invasive Study Type:    TRANSTHORACIC ECHO (TTE) COMPLETE Diagnosis/ICD: Acute systolic (congestive) heart failure (CHF)-I50.21;                Bradycardia, unspecified-R00.1 Indication:    CHF, Bradycardia CPT Code:      Echo Complete w Full Doppler-20405 Patient History: Pertinent History: Previous DVT, HTN, Syncope and Elev BNP. Study Detail: The following Echo studies were performed: 2D, M-Mode, Doppler and               color flow. The patient was awake.  PHYSICIAN INTERPRETATION: Left Ventricle: The left ventricular systolic function is normal with a visually estimated ejection fraction of 70-75%. The left ventricular cavity size is normal. There is mildly increased septal and normal posterior left ventricular wall thickness. Left ventricular diastolic filling cannot be determined due to E/A wave fusion. Left Atrium: The left atrial size was not well visualized. Right Ventricle: The right ventricle is normal in size. There is normal right ventricular global systolic function. RV free wall is not visualized.  Right Atrium: The right atrial size was not well visualized. Aortic Valve: The aortic valve was not well visualized. The aortic valve area by VTI is 2.68 cmï¿½ with a peak velocity of 1.69 m/s. The peak and mean gradients are 7 mmHg and 5 mmHg, respectively with a dimensionless index of 0.69. There is indeterminate aortic valve regurgitation. Mitral Valve: The mitral valve was not well visualized. The doppler estimated peak and mean diastolic pressure gradients are 4.1 mmHg and 2 mmHg, respectively. The mean gradient of the mitral valve is 2 mmHg. Mitral valve regurgitation was not assessed. The E Vmax is 0.96 m/s. Tricuspid Valve: The tricuspid valve was not well visualized. Tricuspid regurgitation was not assessed. The right ventricular systolic pressure could not be estimated. Pulmonic Valve: The pulmonic valve is not well visualized. The pulmonic valve regurgitation was not well visualized. Pericardium: Trivial to small pericardial effusion. There is a pericardial fat pad present. Aorta: The aortic root is normal. Systemic Veins: The inferior vena cava appears normal in size, with IVC inspiratory collapse greater than 50%.  CONCLUSIONS:  1. Poorly visualized anatomical structures due to suboptimal image quality.  2. The left ventricular systolic function is normal with a visually estimated ejection fraction of 70-75%.  3. There is normal right ventricular global systolic function. QUANTITATIVE DATA SUMMARY:  2D MEASUREMENTS:          Normal Ranges: IVSd:            1.09 cm  (0.6-1.1cm) LVPWd:           0.81 cm  (0.6-1.1cm) LVIDd:           4.42 cm  (3.9-5.9cm) LVIDs:           3.52 cm LV Mass Index:   77 g/m2 LVEDV Index:     29 ml/m2 LV % FS          20.4 %  LEFT ATRIUM:                  Normal Ranges: LA Vol A4C:        37.1 ml    (22+/-6mL/m2) LA Vol A2C:        28.6 ml LA Vol BP:         34.9 ml LA Vol Index A4C:  20.5 ml/m2 LA Vol Index A2C:  15.7 ml/m2 LA Vol Index BP:   19.2 ml/m2 LA Area A4C:       15.5  cm2 LA Area A2C:       12.7 cm2 LA Major Axis A4C: 5.5 cm LA Major Axis A2C: 4.8 cm LA Volume Index:   19.3 ml/m2 LA Vol A4C:        34.1 ml LA Vol A2C:        26.6 ml LA Vol Index BSA:  16.7 ml/m2  RIGHT ATRIUM:         Normal Ranges: RA Area A4C:  9.3 cm2  AORTA MEASUREMENTS:         Normal Ranges: Ao Sinus, d:        3.00 cm (2.1-3.5cm) Asc Ao, d:          3.20 cm (2.1-3.4cm)  LV SYSTOLIC FUNCTION:                      Normal Ranges: EF-A4C View:    86 % (>=55%) EF-A2C View:    73 % EF-Biplane:     80 % EF-Visual:      73 % LV EF Reported: 73 %  LV DIASTOLIC FUNCTION:          Normal Ranges: MV Peak E:             0.96 m/s (0.7-1.2 m/s)  MITRAL VALVE:          Normal Ranges: MV Vmax:      1.01 m/s (<=1.3m/s) MV peak P.1 mmHg (<5mmHg) MV mean P.9 mmHg (<2mmHg) MV VTI:       19.17 cm (10-13cm) MV DT:        124 msec (150-240msec)  AORTIC VALVE:                      Normal Ranges: AoV Vmax:                1.69 m/s  (<=1.7m/s) AoV Peak P.4 mmHg (<20mmHg) AoV Mean P.5 mmHg  (1.7-11.5mmHg) LVOT Max Ronny:            1.22 m/s  (<=1.1m/s) AoV VTI:                 24.89 cm  (18-25cm) LVOT VTI:                17.10 cm LVOT Diameter:           2.23 cm   (1.8-2.4cm) AoV Area, VTI:           2.68 cm2  (2.5-5.5cm2) AoV Area,Vmax:           2.83 cm2  (2.5-4.5cm2) AoV Dimensionless Index: 0.69  RIGHT VENTRICLE: RV Basal 2.80 cm RV Mid   1.80 cm RV Major 6.1 cm TAPSE:   12.0 mm RV s'    0.12 m/s  TRICUSPID VALVE/RVSP:          Normal Ranges: Peak TR Velocity:     1.30 m/s Est. RA Pressure:     3 RV Syst Pressure:     10       (< 30mmHg) IVC Diam:             1.00 cm  PULMONIC VALVE:           Normal Ranges: PV Max Ronny:     1.6 m/s   (0.6-0.9m/s) PV Max PG:      10.1 mmHg  AORTA: Asc Ao Diam 3.17 cm  [id number] Avtar Alves MD Electronically signed on 7/15/2025 at 11:49:16 AM  ** Final **     Electrocardiogram, 12-lead PRN ACS symptoms  Result Date: 2025  Marked sinus bradycardia with  Premature atrial complexes with Aberrant conduction Left axis deviation Low voltage QRS Inferior infarct , age undetermined Cannot rule out Anterior infarct (cited on or before 28-JUL-2024) Abnormal ECG When compared with ECG of 28-JUL-2024 08:28, Aberrant conduction is now Present Questionable change in initial forces of Septal leads    Electrocardiogram, 12-lead PRN ACS symptoms  Result Date: 7/14/2025  Atrial fibrillation with rapid ventricular response Right superior axis deviation Pulmonary disease pattern Right ventricular hypertrophy Inferior infarct (cited on or before 11-JUL-2025) Prolonged QT Abnormal ECG When compared with ECG of 11-JUL-2025 10:19, (unconfirmed) Significant changes have occurred    ECG 12 lead  Result Date: 7/14/2025  Atrial fibrillation with slow ventricular response with a competing junctional pacemaker Left axis deviation Low voltage QRS Septal infarct , age undetermined Inferior infarct (cited on or before 11-JUL-2025) ST & T wave abnormality, consider anterior ischemia Abnormal ECG When compared with ECG of 11-JUL-2025 18:15, (unconfirmed) Significant changes have occurred    Assessment & Plan    Stella Agarwal is a 74 y.o. female with a PMH of DVT on life-long eliquis, vascular dementia, multiple falls, HTN, and GERD who presented to [facility] on 7/11/2025 for syncope after mechanical fall. In ED, patient noted to have significant bradycardia in 40s and found to have mildly elevated troponin with significantly elevated BNP and evidence of hypervolemia on exam and imaging. Admitted to medicine for diuresis iso of suspected CHF and pleural effusion. Patient developed a-fib with RVR however able to convert back to sinus rhythm with metoprolol. Cardio following, plan for pacemaker tomorrow so we can rate control her without worrying about worsening underlying bradycardia.     #sick sinus syndrome  #new onset a-fib with RVR  #syncope  #mechanical fall with head injury on  anticoagulation  #multiple recent falls  - CT head and c/t/l spine showed no acute processes  - EKG on admission sinus bradycardia w/o ischemic change  - 7/15 developed a-fib with RVR   - denying any chest pain, SOB, or other symptoms  Plan:  - telemetry  - Cardiology following, plan for PPM tomorrow. On heparin drip until then due to CHADSVASC of 5. Metoprolol PRN for HR >130.    - NPO at midnight  - Holding eliquis, cont heparin gtt  - discontinued donepezil   - PT recommend moderate intensity on dc  - OT recommend low intensity on dc     #new onset acute CHF exacerbation  #large right and small left pleural effusion s/p thoracentesis  - Thoracentesis done 7/12/25, CXR showed no PTX and hemothorax  - BP stable  - appears hypervolemic on exam  - weight down to 185 lbs from 190 lbs on admission  - TTE showed 70% EF with normal systolic function   - 7/15 CXR pending read however appears to show recurrent right pleural effusion  Plan:  - Cardiology following, continue diuresis with lasix 20 mg daily and supplement potassium 20 mEq daily  - Encourage IS and bronchial hygiene   - monitor I&Os      #intermittent nocturnal hypoxia  - witnessed apneic event, suspect CINDI  - resolved quickly with 1 L NC  Plan:  - consider night time CPAP if persistent  - recommend OP PSG/sleep med fu after discharge        RESOLVED MEDICAL ISSUES  #elevated troponin  #Dyspnea  #Hypotension     CHRONIC PROBLEMS:  #DVTs  - Holding eliquis, cont heparin gtt  #HTN  - HOLD home losartan  #Depression  - continue home fluoxetine  #Dementia   - continue home zyprexa     Access: PIV   Antibiotics: None  Oxygenation: 1 L NC  Diet: NPO at midnight  CODE STATUS: Full code confirmed with sister (POA)     Emergency Contact: Extended Emergency Contact Information  Primary Emergency Contact: EDGARDOGIOVANNI  Mobile Phone: [phone]  Relation: Sister  Preferred language: English   needed? No  Secondary Emergency Contact: VICKIE DAIGLE  Mobile  Phone: [phone]  Relation: Relative  Preferred language: English   needed? No     Dispo: SNF once medically clear. 74 year old female admitted with new-onset CHF requiring diuresis.      Aguilar Burton  MS4           PGY-2 Attestation: I saw and evaluated the patient.  I personally obtained the key and critical portions of the history and physical exam or was physically present for key and  critical portions performed by the resident/student. I reviewed the resident/student's documentation and discussed the patient with the resident/student.  I agree with the documentation as detailed in the note unless stated otherwise in this attestation.     Bhakti Grimaldo DO, PGY-2  Internal Medicine   7/16/25 at 2:07 PM.          [1] aspirin, 81 mg, oral, Daily  FLUoxetine, 30 mg, oral, Daily  [Held by provider] furosemide, 40 mg, intravenous, Daily  [Held by provider] losartan, 100 mg, oral, Daily  magnesium oxide, 400 mg of magnesium oxide, oral, Daily  nystatin, 1 Application, Topical, BID  OLANZapine, 10 mg, oral, Nightly  oxygen, , inhalation, Continuous - Inhalation  pantoprazole, 40 mg, oral, Daily before breakfast  perflutren lipid microspheres, 0.5-10 mL of dilution, intravenous, Once in imaging  polyethylene glycol, 17 g, oral, Daily  [2] heparin, 0-4,500 Units/hr, Last Rate: 1,100 Units/hr (07/16/25 1104)  [3] PRN medications: heparin, levalbuterol, metoprolol

## 2025-07-16 NOTE — PROGRESS NOTES
07/16/25 1154   Discharge Planning   Living Arrangements Other (Comment)  (Patient is from [facility])   Support Systems Family members   Assistance Needed Per [facility] at baseline patient is A&OX2, Needs assistance with ADL's(prompt and standby),  Feeds self and ambulates independently with rollator. No active C agency. PCP Mustapha Billings   Type of Residence Assisted living   Do you have animals or pets at home? No   Care Facility Name [facility]   Who is requesting discharge planning? Provider   Home or Post Acute Services Post acute facilities (Rehab/SNF/etc)   Type of Post Acute Facility Services Skilled nursing   Expected Discharge Disposition SNF  (Plan is for the patient to discharge to [facility] when medically ready, will also require a precert.)   Does the patient need discharge transport arranged? Yes   [facility] coordination needed? Yes   Has discharge transport been arranged? No   Patient Choice   Provider Choice list and CMS website (https://medicare.gov/care-compare#search) for post-acute Quality and Resource Measure Data were provided and reviewed with: Patient;Family   Patient / Family choosing to utilize agency / facility established prior to hospitalization No   Intensity of Service   Intensity of Service 0-30 min

## 2025-07-16 NOTE — PROGRESS NOTES
Physical Therapy    Physical Therapy Treatment    Patient Name: Stella Agarwal  MRN: [id number]  Department: [address]  Room: [address] [place] [address]  Today's Date: 7/16/2025  Time Calculation  Start Time: 1445  Stop Time: 1500  Time Calculation (min): 15 min         Assessment/Plan   PT Assessment  PT Assessment Results: Decreased strength, Decreased endurance, Impaired balance, Decreased mobility  Rehab Prognosis: Good  Barriers to Discharge Home: Caregiver assistance, Physical needs  Caregiver Assistance: Caregiver assistance needed per identified barriers - however, level of patient's required assistance exceeds assistance available at home  Physical Needs: Intermittent mobility assistance needed, Intermittent ADL assistance needed, High falls risk due to function or environment  Evaluation/Treatment Tolerance: Patient tolerated treatment well  Medical Staff Made Aware: Yes  Strengths: Housing layout, Support of Caregivers  Barriers to Participation: Comorbidities  End of Session Communication: Bedside nurse  Assessment Comment: Patient with decreased functional mobility compared to reported baseline. Improved fomr 7/13. Patient would continue to benefit from MOD intensity PT intervention at this time.  End of Session Patient Position: Up in chair, Alarm on  PT Plan  Inpatient/Swing Bed or Outpatient: Inpatient  PT Plan  Treatment/Interventions: Bed mobility, Transfer training, Gait training, Balance training, Strengthening, Endurance training, Therapeutic exercise  PT Plan: Ongoing PT  PT Frequency: 3 times per week (During hospitalization)  PT Discharge Recommendations: Moderate intensity level of continued care  Based on current functional status and rehab potential, patient is anticipated to tolerate and benefit from 5 or more days per week of skilled rehabilitative therapy after discharge from this acute inpatient hospitalization.    Equipment Recommended upon Discharge: Wheeled walker (owns)  PT Recommended Transfer Status:  Assist x1  PT - OK to Discharge: Yes (per PT POC)    PT Visit Info:  PT Received On: 07/16/25  Response to Previous Treatment: Patient with no complaints from previous session.     General Visit Information:   General  Reason for Referral: Impaired functional mobility; heart failure  Referred By: Mustapha Paz  Past Medical History Relevant to Rehab: DVT on life-long eliquis, vascular dementia, multiple falls, HTN, and GERD  Family/Caregiver Present: No  Prior to Session Communication: Bedside nurse  Patient Position Received: Alarm on, Up in chair  General Comment: Patient pleasant, cooperative and agreeable to PT tx.    Subjective   Precautions:  Precautions  Medical Precautions: Fall precautions (telemetry, purewick, IV heparin (therapeutic))     Date/Time Vitals Session Patient Position Pulse Resp SpO2 BP MAP (mmHg)    07/16/25 1409 --  --  126  --  95 %  --  --     07/16/25 1445 --  --  106  --  96 %  --  --            Objective   Pain:  Pain Assessment  Pain Assessment: 0-10  0-10 (Numeric) Pain Score: 0 - No pain  Cognition:  Cognition  Overall Cognitive Status: Within Functional Limits  Orientation Level: Disoriented to situation  Processing Speed: Delayed  Coordination:  Movements are Fluid and Coordinated: Yes  Postural Control:  Postural Control  Postural Control: Within Functional Limits  Static Sitting Balance  Static Sitting-Balance Support: No upper extremity supported, Feet supported  Static Sitting-Level of Assistance: Independent  Dynamic Sitting Balance  Dynamic Sitting-Balance Support: No upper extremity supported, Feet supported  Dynamic Sitting-Level of Assistance: Close supervision  Dynamic Sitting-Balance: Forward lean  Static Standing Balance  Static Standing-Balance Support: Bilateral upper extremity supported  Static Standing-Level of Assistance: Minimum assistance    Activity Tolerance:  Activity Tolerance  Endurance: Tolerates 10 - 20 min exercise with multiple  rests  Treatments:  Therapeutic Exercise  Therapeutic Exercise Performed: Yes  Therapeutic Exercise Activity 1: seated: ankle pumps B 2 x 15, LAQ B 2 x 15, Marching B 2 x 15    Ambulation/Gait Training  Ambulation/Gait Training Performed: Yes  Ambulation/Gait Training 1  Surface 1: Level tile  Device 1: Rolling walker  Assistance 1: Contact guard  Quality of Gait 1:  (L foot supination, decreased step length R)  Comments/Distance (ft) 1: 8' (Patient reports she does not wear brace for L foot, no pain)  Transfers  Transfer: Yes  Transfer 1  Transfer From 1: Sit to, Stand to  Transfer to 1: Sit, Stand  Technique 1: Sit to stand, Stand to sit  Transfer Device 1: Walker  Transfer Level of Assistance 1: Moderate assistance         Outcome Measures:  [facility] Basic Mobility  Turning from your back to your side while in a flat bed without using bedrails: A little  Moving from lying on your back to sitting on the side of a flat bed without using bedrails: A little  Moving to and from bed to chair (including a wheelchair): A lot  Standing up from a chair using your arms (e.g. wheelchair or bedside chair): A lot  To walk in hospital room: Total  Climbing 3-5 steps with railing: Total  Basic Mobility - Total Score: 12    Education Documentation  Precautions, taught by Yamila Mcdaniel PT at 7/16/2025  3:22 PM.  Learner: Patient  Readiness: Acceptance  Method: Explanation  Response: Verbalizes Understanding, Needs Reinforcement  Comment: Importance of functional mobility, staff assist and use of walker    Mobility Training, taught by Yamila Mcdaniel PT at 7/16/2025  3:22 PM.  Learner: Patient  Readiness: Acceptance  Method: Explanation  Response: Verbalizes Understanding, Needs Reinforcement  Comment: Importance of functional mobility, staff assist and use of walker    Education Comments  No comments found.        OP EDUCATION:       Encounter Problems       Encounter Problems (Active)       Balance       STG - Maintains static  standing balance with upper extremity support x 2' supervision  (Progressing)       Start:  07/13/25    Expected End:  07/27/25               Mobility       STG - Patient will ambulate with 2WW 25' CGA  (Progressing)       Start:  07/13/25    Expected End:  07/27/25               PT Transfers       STG - Patient will perform bed mobility independently  (Progressing)       Start:  07/13/25    Expected End:  07/27/25            STG - Patient will transfer sit to and from stand supervision  (Progressing)       Start:  07/13/25    Expected End:  07/27/25

## 2025-07-16 NOTE — PROGRESS NOTES
Department of Hospital Medicine  Progress Note    Subjective     Stella Agarwal is a 74 y.o. female on Hospital Day: 6 of admission presenting with Acute systolic heart failure and a-fib with rapid ventricular response.     She is resting well today. Denies chest pain, SOB, abdominal pain, bowel issues, and urinary issues.     Objective   Vitals:  Vitals:    07/15/25 1609 07/15/25 1919 07/15/25 2327 07/16/25 0355   BP: 139/63 (!) 118/48 138/60 156/58   BP Location: Left arm      Patient Position: Lying      Pulse: 56 62 56 62   Resp: 18 20 16 20   Temp: 36.3 °C (97.3 °F) 36.6 °C (97.9 °F) 36.6 °C (97.9 °F) 36.6 °C (97.9 °F)   TempSrc: Temporal      SpO2: 90% 94% 93% 92%   Weight:    83.8 kg (184 lb 11.9 oz)   Height:           Intake/Output last 3 Shifts:  I/O last 3 completed shifts:  In: 445 (5.3 mL/kg) [P.O.:240; I.V.:205 (2.4 mL/kg)]  Out: 2550 (30.4 mL/kg) [Urine:2550 (0.8 mL/kg/hr)]  Weight: 83.8 kg   No intake/output data recorded.    Last stool output         Physical Exam  Constitutional:       Appearance: Normal appearance. She is obese.     Cardiovascular:      Rate and Rhythm: Regular rhythm. Bradycardia present.      Pulses: Normal pulses.      Heart sounds: Normal heart sounds.   Pulmonary:      Breath sounds: Rhonchi present.   Abdominal:      General: There is distension.      Tenderness: There is abdominal tenderness.     Musculoskeletal:      Right lower leg: Edema present.      Left lower leg: Edema present.      Comments: Compression stockings on     Skin:     General: Skin is warm and dry.      Capillary Refill: Capillary refill takes less than 2 seconds.     Neurological:      General: No focal deficit present.      Mental Status: She is alert and oriented to person, place, and time.     Psychiatric:         Mood and Affect: Mood normal.         Behavior: Behavior normal.       Review of Systems   Constitutional: Negative.   HENT: Negative.     Eyes: Negative.    Cardiovascular: Negative.   "  Respiratory: Negative.     Endocrine: Negative.    Skin: Negative.    Musculoskeletal: Negative.    Gastrointestinal: Negative.    Genitourinary: Negative.    Neurological: Negative.    Psychiatric/Behavioral: Negative.          LABS:    CBC:  Results from last 72 hours   Lab Units 07/16/25  0604 07/15/25  0609 07/14/25  0656   WBC AUTO x10*3/uL 8.0 7.1 7.8   RBC AUTO x10*6/uL 3.80* 3.72* 3.80*   HEMOGLOBIN g/dL 10.9* 10.5* 10.8*   HEMATOCRIT % 35.5* 35.6* 35.7*   MCV fL 93 96 94   MCH pg 28.7 28.2 28.4   MCHC g/dL 30.7* 29.5* 30.3*   RDW % 15.3* 15.1* 15.0*   PLATELETS AUTO x10*3/uL 205 188 190     CMP:  Results from last 72 hours   Lab Units 07/16/25  0604 07/15/25  0609 07/14/25  0655   SODIUM mmol/L 142 144 142   POTASSIUM mmol/L 3.8 4.1 4.0   CHLORIDE mmol/L 97* 98 97*   CO2 mmol/L 37* 40* 39*   ANION GAP mmol/L 12 10 10   BUN mg/dL 12 12 15   CREATININE mg/dL 0.69 0.73 0.90   EGFR mL/min/1.73m*2 >90 87 68   GLUCOSE mg/dL 95 91 96     Results from last 72 hours   Lab Units 07/16/25  0604 07/15/25  0609 07/14/25  0655   ALBUMIN g/dL 3.3* 3.2* 3.4     Calcium/Phos:   Results from last 72 hours   Lab Units 07/16/25  0604 07/15/25  0609 07/14/25  0655   CALCIUM mg/dL 8.7 8.5* 8.5*   PHOSPHORUS mg/dL 2.3* 2.9 2.8      COAG:     CRP:   Lab Results   Component Value Date    CRP 0.41 01/12/2022       ENDO:  Recent Labs     09/04/24  0723   TSH 2.51   HGBA1C 5.5      CARDIAC:       No lab exists for component: \"CK\", \"CKMBP\"    No data recorded    MICRO/ID:   No results found for the last 90 days.             Results from last 7 days   Lab Units 07/12/25  4598   FUNGAL CULTURE/, MISC  Culture in progress, a report will be issued when positive or after 2 weeks of incubation.   FUNGAL STAIN  No fungal elements seen      No results found for: \"URINECULTURE\", \"BLOODCULT\", \"CSFCULTSMEAR\"    IMAGING RESULTS:   No results found.    ALLERGIES PAST MEDICAL HISTORY PAST SURGICAL SURGERY FAMILY HISTORY SOCIAL HISTORY "   Allergies[1] Medical History[2] Surgical History[3] Family History[4] Social History[5]         MEDS:  HOME MEDS SCHEDULED MEDS PRN IV MEDS   Current Outpatient Medications   Medication Instructions    acetaminophen (TYLENOL) 650 mg, oral, Every 4 hours PRN    alum-mag hydroxide-simeth (Mylanta) 200-200-20 mg/5 mL oral suspension 15 mL, oral, 2 times daily PRN    aspirin 81 mg EC tablet (1) TABLET BY MOUTH ONCE DAILY    benzonatate (TESSALON) 100 mg, oral, 3 times daily PRN, Do not crush or chew.    bisacodyl (ONELAX BISACODYL) 10 mg, rectal, Daily PRN    diphenhydrAMINE (BENADRYL) 25 mg, oral, Every 6 hours PRN    donepezil (ARICEPT) 5 mg, oral, Nightly    Eliquis 2.5 mg, oral, 2 times daily    FLUoxetine (PROzac) 20 mg capsule (1) CAPSULE BY MOUTH EVERY MORNING FOR ANXIETY / DEPRESSION    FLUoxetine (PROZAC) 10 mg, oral, Every morning, Take with 20mg to equal 30mg    furosemide (Lasix) 20 mg tablet (1) TABLET BY MOUTH ONCE DAILY    hydrocortisone 1 % cream 1 Application, Topical, 4 times daily PRN    ibuprofen 400 mg, oral, Every 4 hours PRN    loperamide (IMODIUM) 2 mg, oral, Daily PRN    loratadine (CLARITIN) 10 mg, oral, Daily PRN    losartan (Cozaar) 100 mg tablet (1) TABLET BY MOUTH ONCE DAILY    magnesium hydroxide (Milk of Magnesia) 400 mg/5 mL suspension 30 mL, oral, Daily PRN    metoprolol succinate XL (Toprol-XL) 50 mg 24 hr tablet (1) TABLET BY MOUTH ONCE DAILY    OLANZapine (ZyPREXA) 10 mg tablet (1) TABLET BY MOUTH AT BEDTIME PSYCHOSIS    omeprazole (PRILOSEC) 40 mg, oral, Daily    sennosides (Senokot) 8.6 mg tablet 2 tablets, oral, 2 times daily PRN    sodium phosphates (Fleet Enema) 19-7 gram/118 mL enema enema 1 enema, rectal, Daily PRN    Scheduled Medications[6] PRN Medications[7] Continuous Medications[8]          Assessment/Plan   ASSESSMENT & PLAN  Stella Agarwal is a very pleasant 74 year old female with a history of DVT (Eliquis), vascular dementia, multiple falls, HTN and GERD, presents  to the ER after a syncopal event. The syncope was not witnessed. Patient is a poor historian. This morning denies pain, unsure why she is in the hospital. Noted in the chart she was walking in her room at the nursing home with her walker and slipped. Her walker hit her in the face and fell backwards hitting her head on the floor. She denies any pain. Denies chest pain, heart palpitations or dizziness. Her daughter has noticed she has had shortness of breath for the past three days. The daughter states she has had multiple falls over the past three months. The daughter states she has a cardiologist at [facility]. She had a period of worsening bradycardia and hypotension after her thoracentesis. Resting well three days after.     Pt was found in A-fib with RVR and HR of 130's at 1037AM. She was asymptomatic at the time. Her BP was 122/76. Her Chadsvasc score was 5. Will administer 5 mg IV metoprolol and Heparin drip. Will continue to follow. She is not in a-fib on the morning of 07/16 and resting well.         ACUTE MEDICAL ISSUES  #Acute on chronic CHF exacerbation  #Pleural Effusion  -large right and small left pleural effusion on CT scan thoracentesis yesterday 700 ml of fluid removed chest x-ray showed small bilateral pleural effusion  -Start Lasix 20 mg PO daily with Potassium 20 mg PO daily  -Continue Aspirin 81 mg daily  -Monitor I&O  -07/14 Net -950  -07/15 Net -1155  -Monitor weight - 190 lbs on 07/11  -Current weight 184 lbs and 11.9 oz  -TTE on 07/15 showed the left ventricular systolic function is normal with a visually estimated ejection fraction of 70-75%. There is normal right ventricular global systolic function.       #New onset Afib w/ RVR   #Sick Sinus Syndrome   - Repeat EKG on 07/15 shows afib w/ RVR  - Will administer 5 mg IV metoprolol once for rate control, under 110  - 5 mg IV metoprolol PRN for HR >130  - Heparin drip for anticoagulation due to chadsvasc of 5  - Hold eliquis pending pacemaker  -  Pacemaker planned for Thursday  - NPO at midnight        #Junctional Bradycardia (Resolved)  -Recommend keeping patient off of AV lazaro blocking agents (adenosine, beta-blockers, calcium channel blockers (non-dihydropyridine), and digoxin if possible.    #Elevated troponin (Resolved)  -elevated troponin secondary to demand ischemia (resolved)      Delano Solano MD  Internal Medicine, PGY-1              [1]   Allergies  Allergen Reactions    Procaine Hcl Shortness of breath    Procaine Unknown   [2]   Past Medical History:  Diagnosis Date    Deep vein thrombosis (DVT) of proximal vein of left lower extremity 09/18/2020    Schizophrenia 11/01/2013   [3] History reviewed. No pertinent surgical history.  [4] No family history on file.  [5]   Social History  Tobacco Use    Smoking status: Never    Smokeless tobacco: Never   [6] aspirin, 81 mg, oral, Daily  FLUoxetine, 30 mg, oral, Daily  [Held by provider] furosemide, 40 mg, intravenous, Daily  [Held by provider] losartan, 100 mg, oral, Daily  magnesium oxide, 400 mg of magnesium oxide, oral, Daily  nystatin, 1 Application, Topical, BID  OLANZapine, 10 mg, oral, Nightly  oxygen, , inhalation, Continuous - Inhalation  pantoprazole, 40 mg, oral, Daily before breakfast  perflutren lipid microspheres, 0.5-10 mL of dilution, intravenous, Once in imaging  polyethylene glycol, 17 g, oral, Daily     [7] PRN medications: heparin, levalbuterol, metoprolol  [8] heparin, 0-4,500 Units/hr, Last Rate: 1,300 Units/hr (07/16/25 0619)

## 2025-07-17 ENCOUNTER — SURGERY (OUTPATIENT)
Age: 75
End: 2025-07-17
Payer: COMMERCIAL

## 2025-07-17 ENCOUNTER — APPOINTMENT (OUTPATIENT)
Dept: RADIOLOGY | Facility: HOSPITAL | Age: 75
DRG: 242 | End: 2025-07-17
Payer: COMMERCIAL

## 2025-07-17 LAB
ACT BLD: 121 SEC (ref 83–199)
ALBUMIN SERPL BCP-MCNC: 3.1 G/DL (ref 3.4–5)
ANION GAP SERPL CALC-SCNC: 11 MMOL/L (ref 10–20)
BUN SERPL-MCNC: 15 MG/DL (ref 6–23)
CALCIUM SERPL-MCNC: 8.4 MG/DL (ref 8.6–10.3)
CHLORIDE SERPL-SCNC: 100 MMOL/L (ref 98–107)
CO2 SERPL-SCNC: 32 MMOL/L (ref 21–32)
CREAT SERPL-MCNC: 0.75 MG/DL (ref 0.5–1.05)
EGFRCR SERPLBLD CKD-EPI 2021: 84 ML/MIN/1.73M*2
ERYTHROCYTE [DISTWIDTH] IN BLOOD BY AUTOMATED COUNT: 15.5 % (ref 11.5–14.5)
FUNGUS SPEC CULT: NORMAL
FUNGUS SPEC FUNGUS STN: NORMAL
GLUCOSE SERPL-MCNC: 108 MG/DL (ref 74–99)
HCT VFR BLD AUTO: 33 % (ref 36–46)
HGB BLD-MCNC: 10.1 G/DL (ref 12–16)
LABORATORY COMMENT REPORT: NORMAL
LABORATORY COMMENT REPORT: NORMAL
MAGNESIUM SERPL-MCNC: 2.04 MG/DL (ref 1.6–2.4)
MCH RBC QN AUTO: 28.7 PG (ref 26–34)
MCHC RBC AUTO-ENTMCNC: 30.6 G/DL (ref 32–36)
MCV RBC AUTO: 94 FL (ref 80–100)
NRBC BLD-RTO: 0 /100 WBCS (ref 0–0)
PATH REPORT.FINAL DX SPEC: NORMAL
PATH REPORT.GROSS SPEC: NORMAL
PATH REPORT.RELEVANT HX SPEC: NORMAL
PATH REPORT.TOTAL CANCER: NORMAL
PHOSPHATE SERPL-MCNC: 2.6 MG/DL (ref 2.5–4.9)
PLATELET # BLD AUTO: 205 X10*3/UL (ref 150–450)
POTASSIUM SERPL-SCNC: 4.2 MMOL/L (ref 3.5–5.3)
RBC # BLD AUTO: 3.52 X10*6/UL (ref 4–5.2)
SODIUM SERPL-SCNC: 139 MMOL/L (ref 136–145)
UFH PPP CHRO-ACNC: 0.5 IU/ML (ref ?–1.1)
UFH PPP CHRO-ACNC: 0.6 IU/ML (ref ?–1.1)
WBC # BLD AUTO: 9.2 X10*3/UL (ref 4.4–11.3)

## 2025-07-17 PROCEDURE — 85027 COMPLETE CBC AUTOMATED: CPT

## 2025-07-17 PROCEDURE — 2780000003 HC OR 278 NO HCPCS: Performed by: STUDENT IN AN ORGANIZED HEALTH CARE EDUCATION/TRAINING PROGRAM

## 2025-07-17 PROCEDURE — 1200000002 HC GENERAL ROOM WITH TELEMETRY DAILY

## 2025-07-17 PROCEDURE — 2500000002 HC RX 250 W HCPCS SELF ADMINISTERED DRUGS (ALT 637 FOR MEDICARE OP, ALT 636 FOR OP/ED)

## 2025-07-17 PROCEDURE — 2550000001 HC RX 255 CONTRASTS: Mod: JW | Performed by: STUDENT IN AN ORGANIZED HEALTH CARE EDUCATION/TRAINING PROGRAM

## 2025-07-17 PROCEDURE — 02HK3JZ INSERTION OF PACEMAKER LEAD INTO RIGHT VENTRICLE, PERCUTANEOUS APPROACH: ICD-10-PCS | Performed by: STUDENT IN AN ORGANIZED HEALTH CARE EDUCATION/TRAINING PROGRAM

## 2025-07-17 PROCEDURE — 02H63JZ INSERTION OF PACEMAKER LEAD INTO RIGHT ATRIUM, PERCUTANEOUS APPROACH: ICD-10-PCS | Performed by: STUDENT IN AN ORGANIZED HEALTH CARE EDUCATION/TRAINING PROGRAM

## 2025-07-17 PROCEDURE — 2500000001 HC RX 250 WO HCPCS SELF ADMINISTERED DRUGS (ALT 637 FOR MEDICARE OP)

## 2025-07-17 PROCEDURE — 36415 COLL VENOUS BLD VENIPUNCTURE: CPT

## 2025-07-17 PROCEDURE — 2500000004 HC RX 250 GENERAL PHARMACY W/ HCPCS (ALT 636 FOR OP/ED): Performed by: BEHAVIOR TECHNICIAN

## 2025-07-17 PROCEDURE — C1781 MESH (IMPLANTABLE): HCPCS | Performed by: STUDENT IN AN ORGANIZED HEALTH CARE EDUCATION/TRAINING PROGRAM

## 2025-07-17 PROCEDURE — 2750000001 HC OR 275 NO HCPCS: Performed by: STUDENT IN AN ORGANIZED HEALTH CARE EDUCATION/TRAINING PROGRAM

## 2025-07-17 PROCEDURE — 2500000004 HC RX 250 GENERAL PHARMACY W/ HCPCS (ALT 636 FOR OP/ED): Performed by: STUDENT IN AN ORGANIZED HEALTH CARE EDUCATION/TRAINING PROGRAM

## 2025-07-17 PROCEDURE — 85347 COAGULATION TIME ACTIVATED: CPT | Performed by: STUDENT IN AN ORGANIZED HEALTH CARE EDUCATION/TRAINING PROGRAM

## 2025-07-17 PROCEDURE — 33208 INSRT HEART PM ATRIAL & VENT: CPT | Performed by: STUDENT IN AN ORGANIZED HEALTH CARE EDUCATION/TRAINING PROGRAM

## 2025-07-17 PROCEDURE — C1892 INTRO/SHEATH,FIXED,PEEL-AWAY: HCPCS | Performed by: STUDENT IN AN ORGANIZED HEALTH CARE EDUCATION/TRAINING PROGRAM

## 2025-07-17 PROCEDURE — C1894 INTRO/SHEATH, NON-LASER: HCPCS | Performed by: STUDENT IN AN ORGANIZED HEALTH CARE EDUCATION/TRAINING PROGRAM

## 2025-07-17 PROCEDURE — 71045 X-RAY EXAM CHEST 1 VIEW: CPT

## 2025-07-17 PROCEDURE — C1898 LEAD, PMKR, OTHER THAN TRANS: HCPCS | Performed by: STUDENT IN AN ORGANIZED HEALTH CARE EDUCATION/TRAINING PROGRAM

## 2025-07-17 PROCEDURE — 85520 HEPARIN ASSAY: CPT

## 2025-07-17 PROCEDURE — 71045 X-RAY EXAM CHEST 1 VIEW: CPT | Performed by: RADIOLOGY

## 2025-07-17 PROCEDURE — 2500000005 HC RX 250 GENERAL PHARMACY W/O HCPCS: Performed by: STUDENT IN AN ORGANIZED HEALTH CARE EDUCATION/TRAINING PROGRAM

## 2025-07-17 PROCEDURE — 99153 MOD SED SAME PHYS/QHP EA: CPT | Performed by: STUDENT IN AN ORGANIZED HEALTH CARE EDUCATION/TRAINING PROGRAM

## 2025-07-17 PROCEDURE — 99232 SBSQ HOSP IP/OBS MODERATE 35: CPT

## 2025-07-17 PROCEDURE — 2720000007 HC OR 272 NO HCPCS: Performed by: STUDENT IN AN ORGANIZED HEALTH CARE EDUCATION/TRAINING PROGRAM

## 2025-07-17 PROCEDURE — 85347 COAGULATION TIME ACTIVATED: CPT

## 2025-07-17 PROCEDURE — 83735 ASSAY OF MAGNESIUM: CPT

## 2025-07-17 PROCEDURE — 99152 MOD SED SAME PHYS/QHP 5/>YRS: CPT | Performed by: STUDENT IN AN ORGANIZED HEALTH CARE EDUCATION/TRAINING PROGRAM

## 2025-07-17 PROCEDURE — C1785 PMKR, DUAL, RATE-RESP: HCPCS | Performed by: STUDENT IN AN ORGANIZED HEALTH CARE EDUCATION/TRAINING PROGRAM

## 2025-07-17 PROCEDURE — 36005 INJECTION EXT VENOGRAPHY: CPT | Mod: LT | Performed by: STUDENT IN AN ORGANIZED HEALTH CARE EDUCATION/TRAINING PROGRAM

## 2025-07-17 PROCEDURE — 99232 SBSQ HOSP IP/OBS MODERATE 35: CPT | Performed by: STUDENT IN AN ORGANIZED HEALTH CARE EDUCATION/TRAINING PROGRAM

## 2025-07-17 PROCEDURE — 0JH606Z INSERTION OF PACEMAKER, DUAL CHAMBER INTO CHEST SUBCUTANEOUS TISSUE AND FASCIA, OPEN APPROACH: ICD-10-PCS | Performed by: STUDENT IN AN ORGANIZED HEALTH CARE EDUCATION/TRAINING PROGRAM

## 2025-07-17 PROCEDURE — 80069 RENAL FUNCTION PANEL: CPT

## 2025-07-17 DEVICE — LEAD 5076-58 CAPSUREFIX NOVUS US EN
Type: IMPLANTABLE DEVICE | Site: HEART | Status: FUNCTIONAL
Brand: CAPSUREFIX® NOVUS

## 2025-07-17 DEVICE — LEAD 5076-52 CAPSUREFIX NOVUS US EN
Type: IMPLANTABLE DEVICE | Site: HEART | Status: FUNCTIONAL
Brand: CAPSUREFIX® NOVUS

## 2025-07-17 DEVICE — IPG W1DR01 AZURE XT DR MRI WL USA BCP
Type: IMPLANTABLE DEVICE | Site: CHEST | Status: FUNCTIONAL
Brand: AZURE™ XT DR MRI SURESCAN™

## 2025-07-17 RX ORDER — MIDAZOLAM HYDROCHLORIDE 1 MG/ML
INJECTION, SOLUTION INTRAMUSCULAR; INTRAVENOUS AS NEEDED
Status: DISCONTINUED | OUTPATIENT
Start: 2025-07-17 | End: 2025-07-17 | Stop reason: HOSPADM

## 2025-07-17 RX ORDER — SODIUM CHLORIDE 9 MG/ML
INJECTION, SOLUTION INTRAVENOUS CONTINUOUS PRN
Status: COMPLETED | OUTPATIENT
Start: 2025-07-17 | End: 2025-07-17

## 2025-07-17 RX ORDER — CEFAZOLIN 1 G/1
INJECTION, POWDER, FOR SOLUTION INTRAVENOUS AS NEEDED
Status: DISCONTINUED | OUTPATIENT
Start: 2025-07-17 | End: 2025-07-17 | Stop reason: HOSPADM

## 2025-07-17 RX ORDER — FENTANYL CITRATE 50 UG/ML
INJECTION, SOLUTION INTRAMUSCULAR; INTRAVENOUS AS NEEDED
Status: DISCONTINUED | OUTPATIENT
Start: 2025-07-17 | End: 2025-07-17 | Stop reason: HOSPADM

## 2025-07-17 RX ORDER — PROTAMINE SULFATE 10 MG/ML
20 INJECTION, SOLUTION INTRAVENOUS ONCE
Status: DISCONTINUED | OUTPATIENT
Start: 2025-07-17 | End: 2025-07-18

## 2025-07-17 RX ORDER — BUPIVACAINE HYDROCHLORIDE 5 MG/ML
INJECTION, SOLUTION EPIDURAL; INTRACAUDAL; PERINEURAL AS NEEDED
Status: DISCONTINUED | OUTPATIENT
Start: 2025-07-17 | End: 2025-07-17 | Stop reason: HOSPADM

## 2025-07-17 RX ADMIN — SODIUM CHLORIDE 50 ML/HR: 9 INJECTION, SOLUTION INTRAVENOUS at 16:08

## 2025-07-17 RX ADMIN — Medication 1 TABLET: at 09:09

## 2025-07-17 RX ADMIN — FUROSEMIDE 20 MG: 20 TABLET ORAL at 09:09

## 2025-07-17 RX ADMIN — APIXABAN 2.5 MG: 2.5 TABLET, FILM COATED ORAL at 20:35

## 2025-07-17 RX ADMIN — POTASSIUM CHLORIDE 20 MEQ: 20 TABLET, EXTENDED RELEASE ORAL at 09:09

## 2025-07-17 RX ADMIN — BUPIVACAINE HYDROCHLORIDE 20 ML: 5 INJECTION, SOLUTION EPIDURAL; INTRACAUDAL; PERINEURAL at 16:20

## 2025-07-17 RX ADMIN — FLUOXETINE HYDROCHLORIDE 30 MG: 10 CAPSULE ORAL at 09:09

## 2025-07-17 RX ADMIN — NYSTATIN 1 APPLICATION: 100000 POWDER TOPICAL at 20:41

## 2025-07-17 RX ADMIN — OLANZAPINE 10 MG: 5 TABLET, FILM COATED ORAL at 20:34

## 2025-07-17 RX ADMIN — CEFAZOLIN 2 G: 330 INJECTION, POWDER, FOR SOLUTION INTRAMUSCULAR; INTRAVENOUS at 16:08

## 2025-07-17 RX ADMIN — IOHEXOL 15 ML: 350 INJECTION, SOLUTION INTRAVENOUS at 16:34

## 2025-07-17 RX ADMIN — MIDAZOLAM 1 MG: 1 INJECTION INTRAMUSCULAR; INTRAVENOUS at 16:17

## 2025-07-17 RX ADMIN — FENTANYL CITRATE 25 MCG: 50 INJECTION, SOLUTION INTRAMUSCULAR; INTRAVENOUS at 16:17

## 2025-07-17 RX ADMIN — ASPIRIN 81 MG: 81 TABLET, DELAYED RELEASE ORAL at 20:34

## 2025-07-17 RX ADMIN — Medication 2 L/MIN: at 20:40

## 2025-07-17 RX ADMIN — Medication 2 L/MIN: at 08:00

## 2025-07-17 ASSESSMENT — COGNITIVE AND FUNCTIONAL STATUS - GENERAL
MOVING TO AND FROM BED TO CHAIR: A LITTLE
WALKING IN HOSPITAL ROOM: A LOT
WALKING IN HOSPITAL ROOM: A LOT
TURNING FROM BACK TO SIDE WHILE IN FLAT BAD: A LITTLE
DAILY ACTIVITIY SCORE: 17
STANDING UP FROM CHAIR USING ARMS: A LITTLE
DAILY ACTIVITIY SCORE: 17
TURNING FROM BACK TO SIDE WHILE IN FLAT BAD: A LITTLE
DRESSING REGULAR LOWER BODY CLOTHING: A LOT
EATING MEALS: A LITTLE
EATING MEALS: A LITTLE
PERSONAL GROOMING: A LITTLE
HELP NEEDED FOR BATHING: A LITTLE
MOBILITY SCORE: 16
MOVING FROM LYING ON BACK TO SITTING ON SIDE OF FLAT BED WITH BEDRAILS: A LITTLE
TOILETING: A LITTLE
CLIMB 3 TO 5 STEPS WITH RAILING: A LOT
MOBILITY SCORE: 16
STANDING UP FROM CHAIR USING ARMS: A LITTLE
DRESSING REGULAR LOWER BODY CLOTHING: A LOT
CLIMB 3 TO 5 STEPS WITH RAILING: A LOT
PERSONAL GROOMING: A LITTLE
TOILETING: A LITTLE
DRESSING REGULAR UPPER BODY CLOTHING: A LITTLE
HELP NEEDED FOR BATHING: A LITTLE
DRESSING REGULAR UPPER BODY CLOTHING: A LITTLE
MOVING FROM LYING ON BACK TO SITTING ON SIDE OF FLAT BED WITH BEDRAILS: A LITTLE
MOVING TO AND FROM BED TO CHAIR: A LITTLE

## 2025-07-17 ASSESSMENT — ENCOUNTER SYMPTOMS
ENDOCRINE NEGATIVE: 1
CONSTITUTIONAL NEGATIVE: 1
EYES NEGATIVE: 1
NEUROLOGICAL NEGATIVE: 1
GASTROINTESTINAL NEGATIVE: 1
MUSCULOSKELETAL NEGATIVE: 1
RESPIRATORY NEGATIVE: 1
PSYCHIATRIC NEGATIVE: 1
CARDIOVASCULAR NEGATIVE: 1

## 2025-07-17 ASSESSMENT — PAIN SCALES - GENERAL
PAINLEVEL_OUTOF10: 0 - NO PAIN
PAINLEVEL_OUTOF10: 0 - NO PAIN

## 2025-07-17 ASSESSMENT — PAIN - FUNCTIONAL ASSESSMENT: PAIN_FUNCTIONAL_ASSESSMENT: 0-10

## 2025-07-17 NOTE — CARE PLAN
Problem: Heart Failure  Goal: Improved gas exchange this shift  Outcome: Progressing  Goal: Reduction in peripheral edema within 24 hours  Outcome: Progressing     Problem: Pain - Adult  Goal: Verbalizes/displays adequate comfort level or baseline comfort level  Outcome: Progressing    The clinical goals for the shift include Pt will remain HDS during shift

## 2025-07-17 NOTE — CARE PLAN
The patient's goals for the shift include  remain comfortable    The clinical goals for the shift include Pt will remain HDS during shift      Problem: Heart Failure  Goal: Improved gas exchange this shift  Outcome: Progressing     Problem: Heart Failure  Goal: Improved urinary output this shift  Outcome: Progressing     Problem: Heart Failure  Goal: Report improvement of dyspnea/breathlessness this shift  Outcome: Progressing     Problem: Pain - Adult  Goal: Verbalizes/displays adequate comfort level or baseline comfort level  Outcome: Progressing

## 2025-07-17 NOTE — POST-PROCEDURE NOTE
Physician Transition of Care Summary  Invasive Cardiovascular Lab    Procedure Date: 7/17/2025  Attending:    * Beto Burgess - Primary  Resident/Fellow/Other Assistant: Surgeons and Role:  * No surgeons found with a matching role *    Indications:   Pre-op Diagnosis      * Atrial fibrillation with RVR (Multi) [I48.91]     * Sick sinus syndrome (Multi) [I49.5]    Post-procedure diagnosis:   Post-op Diagnosis     * Atrial fibrillation with RVR (Multi) [I48.91]     * Sick sinus syndrome (Multi) [I49.5]    Procedure(s):   PPM IMPLANT DUAL  67839 - AK INS NEW/RPLCMT PRM PM W/TRANSV ELTRD ATRIAL&VENT    AK INS NEW/RPLCMT PRM PM W/TRANSV ELTRD ATRIAL&VENT [03660]  AK MOD SED SAME PHYS/QHP INITIAL 15 MINS 5/> YRS [22963]  AK MOD SED SAME PHYS/QHP EACH ADDL 15 MINS [96387]    Procedure Findings:   See below    Description of the Procedure:   Dual chamber pacemaker implantation    Procedures:  Implant of dual chamber PPM (39440)    Patient history:  Please refer to the detailed history and physical on the patient's medical chart.     Procedure narrative:  The patient was in the fasting state. A grounding pad was placed. The patient was set up for continuous monitoring of surface 12 lead ECG and pulse oximetry. Blood pressure was monitored. The procedure was performed under moderate sedation. The left upper chest was prepped and draped in the usual sterile fashion. Local anesthesia: After preoperative IV antibiotic was completely infused, subcutaneous tissues just medial to the left deltopectoral area, were infiltrated with bupivacaine for local anesthesia.   Using a scalpel, an incision was made, which was extended to the left prepectoral fascia using blunt dissection. A pulse generator pocket was created.  A venogram was obtained using 10cc of contrast via the left arm peripheral IV. The images were used to guide access. Under Fluoroscopic a Micropuncture needle was used to access the Left Axillary vein using  Seldinger technique.   A 7 F sheath was placed over of the guidewires. The RV pacing lead was then advanced to the heart via fluoroscopic guidance. The ventricle was mapped, and the lead was fixed to the right ventricular apex. After lead placement, appropriate sensing and thresholds were obtained. The sheath was peeled away.  A second 7 F sheath was advanced over the guidewire, and the dilator and guidewire were removed. Under fluoroscopic guidance, a pacing lead was advanced to the heart, and the atrium was mapped. The lead was fixed to the right atrial appendage. The sheath was peeled away.  The leads were sutured in place to the pectoralis muscle using 0 Tycron suture. Lead measurements were obtained.  A dual chamber pacemaker pulse generator was attached to the leads and implanted using a Tyrx pouch.  The device was interrogated and its parameters recorded; telemetered electrograms and pacing and sensing thresholds were measured.   The pocket was flushed with Vancomycin solution. The wound was closed in three layers using. a 0 Vicryl interrupted layer, followed by a 3.0 Vicryl  continuous layer, and a 4.0 Vicryl continuous layer for the subcuticular layer. Steri-Strips and a dressing were applied.    Final Implant Settings:  Device: Company - [employer]    Lead Parameters  Atrial: 437ohms, P wave 4mV, threshold 1.23V@0.4msec  RVent: 988ohms, R wave 20mV, threshold 0.75V@0.4msec,    Summary:  Successful implantation of a dual chamber pacemaker.      Patient Instructions:  Please do not lift left arm above shoulder level for 4-5 weeks  No repetitive motion or lifting heavy weight for 4-5 weeks  No driving, alcohol or making legal decisions for 24 hours.  Keep wound completely dry for 7 days; may shower but keep bandage as dry as possible.  No soaking in hot tubs or baths for 10 days. May sponge bath  Keep bandage on incision for 1 week.  Allow steristrips underneath to fall off naturally.    Please call our office  if you notice any discharge or swelling around incision or fever.    Follow up:   The patient should be alert for bleeding, swelling, or signs of infection. The patient should call the electrophysiologist immediately if symptoms recur, or for any problems. The patient and family (with HIPPA consent)  have been instructed accordingly.   Follow up in Clinic for wound check as scheduled. Device clinic follow up will then be scheduled. Remote monitoring will be instituted if possible.     See complete procedural log and parameters.     Complications:   None    Stents/Implants:   Implants          Pacemaker          Lead, Capsurefix Novus, 52 Cm - [id number] - Implanted        Inventory item: LEAD, CAPSUREFIX NOVUS, 52 CM Model/Cat number: 5076-52    Serial number: [id number] : [employer]    Lot number: [id number] Device identifier: [id number]    Implant Date: 7/17/2025        GUDID Information       Request status Successful        Brand name: Capsurefix® Novus Version/Model: 5076-52    Company name: [employer] MRI safety info as of 7/17/25: Labeling does not contain MRI Safety Information    Contains dry or latex rubber: No      GMDN P.T. name: Endocardial/interventricular septal pacing lead                As of 7/17/2025       Status: Implanted                        Lead, Capsurefix Novus, 58 Cm - [id number] - Implanted        Inventory item: LEAD, CAPSUREFIX NOVUS, 58 CM Model/Cat number: 5076-58    Serial number: [id number] : [employer]    Lot number: [id number] Device identifier: [id number]    Implant Date: 7/17/2025        GUDID Information       Request status Successful        Brand name: Capsurefix® Novus Version/Model: 5076-58    Company name: [employer] MRI safety info as of 7/17/25: Labeling does not contain MRI Safety Information    Contains dry or latex rubber: No      GMDN P.T. name: Endocardial/interventricular septal pacing lead                As of  7/17/2025       Status: Implanted                        Pacemaker, Dual Chamber, Motley Mri Xt Dr - [id number] - Implanted        Inventory item: PACEMAKER, DUAL CHAMBER, MIGUEL ANGEL MRI XT DR Model/Cat number: [id number]    : [employer] Lot number: [id number]    Device identifier: [id number] Implant Date: 7/17/2025      GUDID Information       Request status Successful        Brand name: Miguel Angel™ ITZ DR MRI SureScan™ Version/Model: W1DR01    Company name: [employer] MRI safety info as of 7/17/25: MR Conditional    Contains dry or latex rubber: No      GMDN P.T. name: Dual-chamber implantable pacemaker, rate-responsive                As of 7/17/2025       Status: Implanted                                Estimated Blood Loss:   * No values recorded between 7/17/2025  4:05 PM and 7/17/2025  5:24 PM *    Anesthesia: Moderate Sedation Anesthesia Staff: No anesthesia staff entered.    Any Specimen(s) Removed:   No specimens collected during this procedure.      Electronically signed by: Beto Lemus MD, 7/17/2025 5:24 PM

## 2025-07-17 NOTE — PROGRESS NOTES
Department of Hospital Medicine  Progress Note    Subjective     Stella Agarwal is a 74 y.o. female on Hospital Day: 7 of admission presenting with Acute systolic heart failure and a-fib with rapid ventricular response.     She is resting well today. Denies chest pain, SOB, abdominal pain, bowel issues, and urinary issues.  She was brought back from pacemaker placement procedure because heparin was not stopped.  They will complete it later today.    Objective   Vitals:  Vitals:    07/16/25 1933 07/16/25 2310 07/17/25 0357 07/17/25 1148   BP:  153/69 133/59 139/61   BP Location:    Left arm   Pulse:  60 66 74   Resp:  20 20 17   Temp:  36.3 °C (97.3 °F) 36.4 °C (97.5 °F) 36.5 °C (97.7 °F)   TempSrc:    Temporal   SpO2: 94% 94% 94% 98%   Weight:   85.1 kg (187 lb 9.8 oz)    Height:           Intake/Output last 3 Shifts:  I/O last 3 completed shifts:  In: 1737.9 (20.4 mL/kg) [P.O.:1380; I.V.:357.9 (4.2 mL/kg)]  Out: 1550 (18.2 mL/kg) [Urine:1550 (0.5 mL/kg/hr)]  Weight: 85.1 kg   No intake/output data recorded.    Last stool output         Physical Exam  Constitutional:       Appearance: Normal appearance. She is obese.     Cardiovascular:      Rate and Rhythm: Regular rhythm. Bradycardia present.      Pulses: Normal pulses.      Heart sounds: Normal heart sounds.   Pulmonary:      Breath sounds: Rhonchi present.   Abdominal:      General: There is distension.      Tenderness: There is abdominal tenderness.     Musculoskeletal:      Right lower leg: Edema present.      Left lower leg: Edema present.      Comments: Compression stockings on     Skin:     General: Skin is warm and dry.      Capillary Refill: Capillary refill takes less than 2 seconds.     Neurological:      General: No focal deficit present.      Mental Status: She is alert and oriented to person, place, and time.     Psychiatric:         Mood and Affect: Mood normal.         Behavior: Behavior normal.       Review of Systems   Constitutional: Negative.  "  HENT: Negative.     Eyes: Negative.    Cardiovascular: Negative.    Respiratory: Negative.     Endocrine: Negative.    Skin: Negative.    Musculoskeletal: Negative.    Gastrointestinal: Negative.    Genitourinary: Negative.    Neurological: Negative.    Psychiatric/Behavioral: Negative.          LABS:    CBC:  Results from last 72 hours   Lab Units 07/17/25  0638 07/16/25  0604 07/15/25  0609   WBC AUTO x10*3/uL 9.2 8.0 7.1   RBC AUTO x10*6/uL 3.52* 3.80* 3.72*   HEMOGLOBIN g/dL 10.1* 10.9* 10.5*   HEMATOCRIT % 33.0* 35.5* 35.6*   MCV fL 94 93 96   MCH pg 28.7 28.7 28.2   MCHC g/dL 30.6* 30.7* 29.5*   RDW % 15.5* 15.3* 15.1*   PLATELETS AUTO x10*3/uL 205 205 188     CMP:  Results from last 72 hours   Lab Units 07/17/25  0638 07/16/25  0604 07/15/25  0609   SODIUM mmol/L 139 142 144   POTASSIUM mmol/L 4.2 3.8 4.1   CHLORIDE mmol/L 100 97* 98   CO2 mmol/L 32 37* 40*   ANION GAP mmol/L 11 12 10   BUN mg/dL 15 12 12   CREATININE mg/dL 0.75 0.69 0.73   EGFR mL/min/1.73m*2 84 >90 87   GLUCOSE mg/dL 108* 95 91     Results from last 72 hours   Lab Units 07/17/25  0638 07/16/25  0604 07/15/25  0609   ALBUMIN g/dL 3.1* 3.3* 3.2*     Calcium/Phos:   Results from last 72 hours   Lab Units 07/17/25  0638 07/16/25  0604 07/15/25  0609   CALCIUM mg/dL 8.4* 8.7 8.5*   PHOSPHORUS mg/dL 2.6 2.3* 2.9      COAG:     CRP:   Lab Results   Component Value Date    CRP 0.41 01/12/2022       ENDO:  Recent Labs     09/04/24  0723   TSH 2.51   HGBA1C 5.5      CARDIAC:       No lab exists for component: \"CK\", \"CKMBP\"    No data recorded    MICRO/ID:   No results found for the last 90 days.             Results from last 7 days   Lab Units 07/12/25  1348   FUNGAL CULTURE/, MISC  Culture in progress, a report will be issued when positive or after 2 weeks of incubation.   FUNGAL STAIN  No fungal elements seen      No results found for: \"URINECULTURE\", \"BLOODCULT\", \"CSFCULTSMEAR\"    IMAGING RESULTS:   No results found.    ALLERGIES PAST MEDICAL " HISTORY PAST SURGICAL SURGERY FAMILY HISTORY SOCIAL HISTORY   Allergies[1] Medical History[2] Surgical History[3] Family History[4] Social History[5]         MEDS:  HOME MEDS SCHEDULED MEDS PRN IV MEDS   Current Outpatient Medications   Medication Instructions    acetaminophen (TYLENOL) 650 mg, oral, Every 4 hours PRN    alum-mag hydroxide-simeth (Mylanta) 200-200-20 mg/5 mL oral suspension 15 mL, oral, 2 times daily PRN    aspirin 81 mg EC tablet (1) TABLET BY MOUTH ONCE DAILY    benzonatate (TESSALON) 100 mg, oral, 3 times daily PRN, Do not crush or chew.    bisacodyl (ONELAX BISACODYL) 10 mg, rectal, Daily PRN    diphenhydrAMINE (BENADRYL) 25 mg, oral, Every 6 hours PRN    donepezil (ARICEPT) 5 mg, oral, Nightly    Eliquis 2.5 mg, oral, 2 times daily    FLUoxetine (PROzac) 20 mg capsule (1) CAPSULE BY MOUTH EVERY MORNING FOR ANXIETY / DEPRESSION    FLUoxetine (PROZAC) 10 mg, oral, Every morning, Take with 20mg to equal 30mg    furosemide (Lasix) 20 mg tablet (1) TABLET BY MOUTH ONCE DAILY    hydrocortisone 1 % cream 1 Application, Topical, 4 times daily PRN    ibuprofen 400 mg, oral, Every 4 hours PRN    loperamide (IMODIUM) 2 mg, oral, Daily PRN    loratadine (CLARITIN) 10 mg, oral, Daily PRN    losartan (Cozaar) 100 mg tablet (1) TABLET BY MOUTH ONCE DAILY    magnesium hydroxide (Milk of Magnesia) 400 mg/5 mL suspension 30 mL, oral, Daily PRN    metoprolol succinate XL (Toprol-XL) 50 mg 24 hr tablet (1) TABLET BY MOUTH ONCE DAILY    OLANZapine (ZyPREXA) 10 mg tablet (1) TABLET BY MOUTH AT BEDTIME PSYCHOSIS    omeprazole (PRILOSEC) 40 mg, oral, Daily    sennosides (Senokot) 8.6 mg tablet 2 tablets, oral, 2 times daily PRN    sodium phosphates (Fleet Enema) 19-7 gram/118 mL enema enema 1 enema, rectal, Daily PRN    Scheduled Medications[6] PRN Medications[7] Continuous Medications[8]          Assessment/Plan   ASSESSMENT & PLAN  Stella Agarwal is a very pleasant 74 year old female with a history of DVT (Eliquis),  vascular dementia, multiple falls, HTN and GERD, presents to the ER after a syncopal event. The syncope was not witnessed. Patient is a poor historian. This morning denies pain, unsure why she is in the hospital. Noted in the chart she was walking in her room at the nursing home with her walker and slipped. Her walker hit her in the face and fell backwards hitting her head on the floor. She denies any pain. Denies chest pain, heart palpitations or dizziness. Her daughter has noticed she has had shortness of breath for the past three days. The daughter states she has had multiple falls over the past three months. The daughter states she has a cardiologist at [facility]. She had a period of worsening bradycardia and hypotension after her thoracentesis. Resting well three days after.     Pt was found in A-fib with RVR and HR of 130's at 1037AM. She was asymptomatic at the time. Her BP was 122/76. Her Chadsvasc score was 5. Will administer 5 mg IV metoprolol and Heparin drip. Will continue to follow. She is not in a-fib on the morning of 07/16 and resting well. She was planning to have pacemaker today but heparin was not stopped appropriately. Will complete later today.        ACUTE MEDICAL ISSUES  #Acute on chronic CHF exacerbation  #Pleural Effusion  -large right and small left pleural effusion on CT scan thoracentesis yesterday 700 ml of fluid removed chest x-ray showed small bilateral pleural effusion  -Start Lasix 20 mg PO daily with Potassium 20 mg PO daily  -Continue Aspirin 81 mg daily  -Monitor I&O  -07/14 Net -950  -07/15 Net -1155  -Monitor weight - 190 lbs on 07/11  -Current weight 187 lbs   -TTE on 07/15 showed the left ventricular systolic function is normal with a visually estimated ejection fraction of 70-75%. There is normal right ventricular global systolic function.       #New onset Afib w/ RVR   #Sick Sinus Syndrome   - Repeat EKG on 07/15 shows afib w/ RVR  - Will administer 5 mg IV metoprolol once for  rate control, under 110  - 5 mg IV metoprolol PRN for HR >130  - Heparin held for PPD procedure  - Hold eliquis pending pacemaker  - On 7/17 pacemaker procedure was unsuccessful because heparin was not stopped will reattempt later today          #Junctional Bradycardia (Resolved)  -Recommend keeping patient off of AV lazaro blocking agents (adenosine, beta-blockers, calcium channel blockers (non-dihydropyridine), and digoxin if possible.    #Elevated troponin (Resolved)  -elevated troponin secondary to demand ischemia (resolved)      Delano Solano MD  Internal Medicine, PGY-1        [1]   Allergies  Allergen Reactions    Procaine Hcl Shortness of breath    Procaine Unknown   [2]   Past Medical History:  Diagnosis Date    CHF (congestive heart failure)     Deep vein thrombosis (DVT) of proximal vein of left lower extremity 09/18/2020    Hypertension     Schizophrenia 11/01/2013   [3] History reviewed. No pertinent surgical history.  [4] No family history on file.  [5]   Social History  Tobacco Use    Smoking status: Never    Smokeless tobacco: Never   Substance Use Topics    Alcohol use: Defer    Drug use: Defer   [6] aspirin, 81 mg, oral, Daily  FLUoxetine, 30 mg, oral, Daily  [Held by provider] furosemide, 40 mg, intravenous, Daily  furosemide, 20 mg, oral, Daily  [Held by provider] losartan, 100 mg, oral, Daily  magnesium oxide, 400 mg of magnesium oxide, oral, Daily  nystatin, 1 Application, Topical, BID  OLANZapine, 10 mg, oral, Nightly  oxygen, , inhalation, Continuous - Inhalation  pantoprazole, 40 mg, oral, Daily before breakfast  perflutren lipid microspheres, 0.5-10 mL of dilution, intravenous, Once in imaging  polyethylene glycol, 17 g, oral, Daily  potassium chloride CR, 20 mEq, oral, Daily  protamine, 20 mg, intravenous, Once     [7] PRN medications: heparin, levalbuterol, metoprolol  [8] heparin, 0-4,500 Units/hr, Last Rate: Stopped (07/17/25 0738)

## 2025-07-17 NOTE — PROGRESS NOTES
"Subjective    No acute events overnight.    Patient reported feeling \"fine.\" Patient denies any chest pain, SOB, or abdominal pain.      Objective    Temp:  [36.2 °C (97.2 °F)-36.4 °C (97.5 °F)] 36.4 °C (97.5 °F)  Heart Rate:  [] 66  Resp:  [17-20] 20  BP: (109-153)/(42-98) 133/59    Physical Exam  Constitutional:       Appearance: Normal appearance.   HENT:      Head: Normocephalic and atraumatic.     Cardiovascular:      Rate and Rhythm: Normal rate and regular rhythm.      Heart sounds: Normal heart sounds. No murmur heard.  Pulmonary:      Effort: Pulmonary effort is normal.      Breath sounds: Normal breath sounds. No wheezing.   Abdominal:      General: Abdomen is flat. There is no distension.      Palpations: Abdomen is soft. There is no mass.      Tenderness: There is no abdominal tenderness.     Musculoskeletal:      Right lower leg: Edema (trace pitting edema to ankle) present.      Left lower leg: Edema (trace pitting edema to ankle) present.     Neurological:      General: No focal deficit present.      Mental Status: She is alert. Mental status is at baseline.     Psychiatric:         Mood and Affect: Mood normal.         Behavior: Behavior normal.         Scheduled medications  Scheduled Medications[1]  Continuous medications  Continuous Medications[2]  PRN medications  PRN Medications[3]    Results for orders placed or performed during the hospital encounter of 07/11/25 (from the past 24 hours)   Heparin Assay   Result Value Ref Range    Heparin Unfractionated 0.9 See Comment Below for Therapeutic Ranges IU/mL   Heparin Assay   Result Value Ref Range    Heparin Unfractionated 0.8 See Comment Below for Therapeutic Ranges IU/mL   Heparin Assay, UFH   Result Value Ref Range    Heparin Unfractionated 0.6 See Comment Below for Therapeutic Ranges IU/mL   Magnesium   Result Value Ref Range    Magnesium 2.04 1.60 - 2.40 mg/dL   Renal Function Panel   Result Value Ref Range    Glucose 108 (H) 74 - 99 " mg/dL    Sodium 139 136 - 145 mmol/L    Potassium 4.2 3.5 - 5.3 mmol/L    Chloride 100 98 - 107 mmol/L    Bicarbonate 32 21 - 32 mmol/L    Anion Gap 11 10 - 20 mmol/L    Urea Nitrogen 15 6 - 23 mg/dL    Creatinine 0.75 0.50 - 1.05 mg/dL    eGFR 84 >60 mL/min/1.73m*2    Calcium 8.4 (L) 8.6 - 10.3 mg/dL    Phosphorus 2.6 2.5 - 4.9 mg/dL    Albumin 3.1 (L) 3.4 - 5.0 g/dL   CBC   Result Value Ref Range    WBC 9.2 4.4 - 11.3 x10*3/uL    nRBC 0.0 0.0 - 0.0 /100 WBCs    RBC 3.52 (L) 4.00 - 5.20 x10*6/uL    Hemoglobin 10.1 (L) 12.0 - 16.0 g/dL    Hematocrit 33.0 (L) 36.0 - 46.0 %    MCV 94 80 - 100 fL    MCH 28.7 26.0 - 34.0 pg    MCHC 30.6 (L) 32.0 - 36.0 g/dL    RDW 15.5 (H) 11.5 - 14.5 %    Platelets 205 150 - 450 x10*3/uL   Heparin Assay, UFH   Result Value Ref Range    Heparin Unfractionated 0.5 See Comment Below for Therapeutic Ranges IU/mL       Imaging  XR chest 1 view  Result Date: 7/16/2025  1. Redemonstration cardiomegaly with findings suggestive of mild-to-moderate interstitial edema, similar to slightly more pronounced on today's radiograph compared to prior radiograph dated 07/12/2025. 2. Mild interval increase in right basilar airspace opacity compared to prior radiograph dated 07/12/2025, which could be related to interval increase in right-sided pleural effusion versus pneumonia in appropriate clinical setting.   MACRO: None   Signed by: Natasha Fisher 7/16/2025 1:08 PM Dictation workstation:   [id number]    XR chest 1 view  Result Date: 7/12/2025  1. Cardiomegaly with perihilar vascular congestion, small bilateral pleural effusion and bibasal atelectasis.   Signed by: Jaleel Godinez 7/12/2025 3:48 PM Dictation workstation:   [id number]    CT chest abdomen pelvis w IV contrast  Result Date: 7/11/2025  1.  Large right and small left pleural effusions. 2.  Near-complete collapse and compressive atelectasis of the right lower lobe. 3.  Cardiomegaly with multichamber enlargement. 4.  Coronary artery  calcifications. 5.  No evidence for acute fracture or traumatic subluxation of the thoracic or lumbar spine. 6.  Multilevel discogenic degenerative changes with mild scoliosis of the lumbar spine. 7.  Trace peritoneal ascites. Signed by Gabino Chavez MD    CT lumbar spine retrospective reconstruction protocol  Result Date: 7/11/2025  1.  Large right and small left pleural effusions. 2.  Near-complete collapse and compressive atelectasis of the right lower lobe. 3.  Cardiomegaly with multichamber enlargement. 4.  Coronary artery calcifications. 5.  No evidence for acute fracture or traumatic subluxation of the thoracic or lumbar spine. 6.  Multilevel discogenic degenerative changes with mild scoliosis of the lumbar spine. 7.  Trace peritoneal ascites. Signed by Gabino Chavez MD    CT thoracic spine retrospective reconstruction protocol  Result Date: 7/11/2025  1.  Large right and small left pleural effusions. 2.  Near-complete collapse and compressive atelectasis of the right lower lobe. 3.  Cardiomegaly with multichamber enlargement. 4.  Coronary artery calcifications. 5.  No evidence for acute fracture or traumatic subluxation of the thoracic or lumbar spine. 6.  Multilevel discogenic degenerative changes with mild scoliosis of the lumbar spine. 7.  Trace peritoneal ascites. Signed by Gabino Chavez MD    CT cervical spine wo IV contrast  Result Date: 7/11/2025  DJD in the cervical spine as described.   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   No CT evidence of cervical spine fracture in this exam.   Moderate-sized right pleural effusion.   MACRO: None   Signed by: Charbel Smith 7/11/2025 10:59 AM Dictation workstation:   [id number]    CT head wo IV contrast  Result Date: 7/11/2025  No depressed skull fracture. No acute intracranial bleed or focal mass effect.   Mild volume loss.     MACRO: None   Signed by: Charbel  Luis 7/11/2025 10:51 AM Dictation workstation:   [id number]      Cardiology, Vascular, and Other Imaging  ECG 12 lead  Result Date: 7/16/2025  Poor data quality, interpretation may be adversely affected Sinus bradycardia with Premature supraventricular complexes Left axis deviation Possible Inferior infarct (cited on or before 11-JUL-2025) Abnormal ECG When compared with ECG of 15-JUL-2025 12:27, (unconfirmed) Significant changes have occurred Sinus rhythm has replaced Atrial fibrillation Confirmed by Avtar Alves ([id number]) on 7/16/2025 11:05:02 AM    Transthoracic Echo Complete  Result Date: 7/15/2025   [facility], [address] [place] [address]               Tel [phone] and Fax [phone] TRANSTHORACIC ECHOCARDIOGRAM REPORT  Patient Name:       JAKE SHEA      Reading Physician:    [id number] Avtar Alves MD Study Date:         7/15/2025           Ordering Provider:    [id number] DAVID JAMES MRN/PID:            [id number]            Fellow: Accession#:         [id number]        Nurse: Date of Birth/Age:  1950 / 74      Sonographer:          Comfort jarquin                                     RDATIF Gender assigned at  F                   Additional Staff: Birth: Height:             152.40 cm           Admit Date:           7/11/2025 Weight:             84.82 kg            Admission Status:     Inpatient -                                                               Routine BSA / BMI:          1.81 m2 / 36.52     Encounter#:           [id number]                     kg/m2 Blood Pressure:     119/68 mmHg         Department Location:  Critical access hospital Non                                                               Invasive Study Type:    TRANSTHORACIC ECHO (TTE) COMPLETE Diagnosis/ICD: Acute systolic (congestive) heart failure (CHF)-I50.21;                 Bradycardia, unspecified-R00.1 Indication:    CHF, Bradycardia CPT Code:      Echo Complete w Full Doppler-81469 Patient History: Pertinent History: Previous DVT, HTN, Syncope and Elev BNP. Study Detail: The following Echo studies were performed: 2D, M-Mode, Doppler and               color flow. The patient was awake.  PHYSICIAN INTERPRETATION: Left Ventricle: The left ventricular systolic function is normal with a visually estimated ejection fraction of 70-75%. The left ventricular cavity size is normal. There is mildly increased septal and normal posterior left ventricular wall thickness. Left ventricular diastolic filling cannot be determined due to E/A wave fusion. Left Atrium: The left atrial size was not well visualized. Right Ventricle: The right ventricle is normal in size. There is normal right ventricular global systolic function. RV free wall is not visualized. Right Atrium: The right atrial size was not well visualized. Aortic Valve: The aortic valve was not well visualized. The aortic valve area by VTI is 2.68 cmï¿½ with a peak velocity of 1.69 m/s. The peak and mean gradients are 7 mmHg and 5 mmHg, respectively with a dimensionless index of 0.69. There is indeterminate aortic valve regurgitation. Mitral Valve: The mitral valve was not well visualized. The doppler estimated peak and mean diastolic pressure gradients are 4.1 mmHg and 2 mmHg, respectively. The mean gradient of the mitral valve is 2 mmHg. Mitral valve regurgitation was not assessed. The E Vmax is 0.96 m/s. Tricuspid Valve: The tricuspid valve was not well visualized. Tricuspid regurgitation was not assessed. The right ventricular systolic pressure could not be estimated. Pulmonic Valve: The pulmonic valve is not well visualized. The pulmonic valve regurgitation was not well visualized. Pericardium: Trivial to small pericardial effusion. There is a pericardial fat pad present. Aorta: The aortic root is normal. Systemic Veins:  The inferior vena cava appears normal in size, with IVC inspiratory collapse greater than 50%.  CONCLUSIONS:  1. Poorly visualized anatomical structures due to suboptimal image quality.  2. The left ventricular systolic function is normal with a visually estimated ejection fraction of 70-75%.  3. There is normal right ventricular global systolic function. QUANTITATIVE DATA SUMMARY:  2D MEASUREMENTS:          Normal Ranges: IVSd:            1.09 cm  (0.6-1.1cm) LVPWd:           0.81 cm  (0.6-1.1cm) LVIDd:           4.42 cm  (3.9-5.9cm) LVIDs:           3.52 cm LV Mass Index:   77 g/m2 LVEDV Index:     29 ml/m2 LV % FS          20.4 %  LEFT ATRIUM:                  Normal Ranges: LA Vol A4C:        37.1 ml    (22+/-6mL/m2) LA Vol A2C:        28.6 ml LA Vol BP:         34.9 ml LA Vol Index A4C:  20.5 ml/m2 LA Vol Index A2C:  15.7 ml/m2 LA Vol Index BP:   19.2 ml/m2 LA Area A4C:       15.5 cm2 LA Area A2C:       12.7 cm2 LA Major Axis A4C: 5.5 cm LA Major Axis A2C: 4.8 cm LA Volume Index:   19.3 ml/m2 LA Vol A4C:        34.1 ml LA Vol A2C:        26.6 ml LA Vol Index BSA:  16.7 ml/m2  RIGHT ATRIUM:         Normal Ranges: RA Area A4C:  9.3 cm2  AORTA MEASUREMENTS:         Normal Ranges: Ao Sinus, d:        3.00 cm (2.1-3.5cm) Asc Ao, d:          3.20 cm (2.1-3.4cm)  LV SYSTOLIC FUNCTION:                      Normal Ranges: EF-A4C View:    86 % (>=55%) EF-A2C View:    73 % EF-Biplane:     80 % EF-Visual:      73 % LV EF Reported: 73 %  LV DIASTOLIC FUNCTION:          Normal Ranges: MV Peak E:             0.96 m/s (0.7-1.2 m/s)  MITRAL VALVE:          Normal Ranges: MV Vmax:      1.01 m/s (<=1.3m/s) MV peak P.1 mmHg (<5mmHg) MV mean P.9 mmHg (<2mmHg) MV VTI:       19.17 cm (10-13cm) MV DT:        124 msec (150-240msec)  AORTIC VALVE:                      Normal Ranges: AoV Vmax:                1.69 m/s  (<=1.7m/s) AoV Peak P.4 mmHg (<20mmHg) AoV Mean P.5 mmHg  (1.7-11.5mmHg)  LVOT Max Ronny:            1.22 m/s  (<=1.1m/s) AoV VTI:                 24.89 cm  (18-25cm) LVOT VTI:                17.10 cm LVOT Diameter:           2.23 cm   (1.8-2.4cm) AoV Area, VTI:           2.68 cm2  (2.5-5.5cm2) AoV Area,Vmax:           2.83 cm2  (2.5-4.5cm2) AoV Dimensionless Index: 0.69  RIGHT VENTRICLE: RV Basal 2.80 cm RV Mid   1.80 cm RV Major 6.1 cm TAPSE:   12.0 mm RV s'    0.12 m/s  TRICUSPID VALVE/RVSP:          Normal Ranges: Peak TR Velocity:     1.30 m/s Est. RA Pressure:     3 RV Syst Pressure:     10       (< 30mmHg) IVC Diam:             1.00 cm  PULMONIC VALVE:           Normal Ranges: PV Max Ronny:     1.6 m/s   (0.6-0.9m/s) PV Max PG:      10.1 mmHg  AORTA: Asc Ao Diam 3.17 cm  [id number] Avtar Alves MD Electronically signed on 7/15/2025 at 11:49:16 AM  ** Final **     Electrocardiogram, 12-lead PRN ACS symptoms  Result Date: 7/14/2025  Marked sinus bradycardia with Premature atrial complexes with Aberrant conduction Left axis deviation Low voltage QRS Inferior infarct , age undetermined Cannot rule out Anterior infarct (cited on or before 28-JUL-2024) Abnormal ECG When compared with ECG of 28-JUL-2024 08:28, Aberrant conduction is now Present Questionable change in initial forces of Septal leads    Electrocardiogram, 12-lead PRN ACS symptoms  Result Date: 7/14/2025  Atrial fibrillation with rapid ventricular response Right superior axis deviation Pulmonary disease pattern Right ventricular hypertrophy Inferior infarct (cited on or before 11-JUL-2025) Prolonged QT Abnormal ECG When compared with ECG of 11-JUL-2025 10:19, (unconfirmed) Significant changes have occurred    ECG 12 lead  Result Date: 7/14/2025  Atrial fibrillation with slow ventricular response with a competing junctional pacemaker Left axis deviation Low voltage QRS Septal infarct , age undetermined Inferior infarct (cited on or before 11-JUL-2025) ST & T wave abnormality, consider anterior ischemia Abnormal ECG When compared with  ECG of 11-JUL-2025 18:15, (unconfirmed) Significant changes have occurred      Assessment & Plan    Stella Agarwal is a 74 y.o. female with a PMH of DVT on life-long eliquis, vascular dementia, multiple falls, HTN, and GERD who presented to [facility] on 7/11/2025 for syncope after mechanical fall. In ED, patient noted to have significant bradycardia in 40s and found to have mildly elevated troponin with significantly elevated BNP and evidence of hypervolemia on exam and imaging. Admitted to medicine for diuresis iso of suspected CHF and pleural effusion. Patient developed a-fib with RVR however able to convert back to sinus rhythm with metoprolol. Cardio following, plan for pacemaker later today so we can rate control her without worrying about worsening underlying bradycardia.      #sick sinus syndrome  #new onset a-fib with RVR  #syncope  #mechanical fall with head injury on anticoagulation  #multiple recent falls  - CT head and c/t/l spine showed no acute processes  - EKG on admission sinus bradycardia w/o ischemic change  - 7/15 developed a-fib with RVR   - denying any chest pain, SOB, or other symptoms  Plan:  - telemetry  - Cardiology following, plan for PPM later today. On heparin drip until then due to CHADSVASC of 5. Metoprolol PRN for HR >130.    - NPO  - Holding eliquis until after PPM placed, cont heparin gtt  - consider adding scheduled rate control after PPM placed  - discontinued donepezil   - PT recommend moderate intensity on dc  - OT recommend low intensity on dc     #new onset acute CHF exacerbation  #large right and small left pleural effusion s/p thoracentesis  - Thoracentesis done 7/12/25, CXR showed no PTX and hemothorax  - BP stable  - appears hypervolemic on exam  - weight down to 185 lbs from 190 lbs on admission  - TTE showed 70% EF with normal systolic function   - 7/15 CXR shows interval increase in right pleural effusion as well as some interstitial edema  Plan:  - Cardiology following,  continue diuresis with lasix 20 mg daily and supplement potassium 20 mEq daily  - consider starting jardiance after PPM placed   - Encourage IS and bronchial hygiene   - monitor I&Os      #intermittent nocturnal hypoxia  - witnessed apneic event, suspect CINDI  - resolved quickly with 1 L NC  Plan:  - consider night time CPAP if persistent  - recommend OP PSG/sleep med fu after discharge        RESOLVED MEDICAL ISSUES  #elevated troponin  #Dyspnea  #Hypotension     CHRONIC PROBLEMS:  #DVTs  - Holding eliquis, cont heparin gtt  #HTN  - HOLD home losartan  #Depression  - continue home fluoxetine  #Dementia   - continue home zyprexa     Access: PIV   Antibiotics: None  Oxygenation: RA  Diet: NPO  CODE STATUS: Full code confirmed with sister (POA)     Emergency Contact: Extended Emergency Contact Information  Primary Emergency Contact: EDGARDOGIOVANNI  Mobile Phone: [phone]  Relation: Sister  Preferred language: English   needed? No  Secondary Emergency Contact: VICKIE DAIGLE  Mobile Phone: [phone]  Relation: Relative  Preferred language: English   needed? No     Dispo: SNF once medically clear. 74 year old female admitted with new-onset CHF requiring diuresis.      Aguilar Burton  MS4           PGY-2 Attestation: I saw and evaluated the patient.  I personally obtained the key and critical portions of the history and physical exam or was physically present for key and  critical portions performed by the resident/student. I reviewed the resident/student's documentation and discussed the patient with the resident/student.  I agree with the documentation as detailed in the note unless stated otherwise in this attestation.       Bhakti Grimaldo DO, PGY-2  Internal Medicine   7/17/25 at 4:04 PM.        [1] aspirin, 81 mg, oral, Daily  FLUoxetine, 30 mg, oral, Daily  [Held by provider] furosemide, 40 mg, intravenous, Daily  furosemide, 20 mg, oral, Daily  [Held by provider] losartan, 100 mg,  oral, Daily  magnesium oxide, 400 mg of magnesium oxide, oral, Daily  nystatin, 1 Application, Topical, BID  OLANZapine, 10 mg, oral, Nightly  oxygen, , inhalation, Continuous - Inhalation  pantoprazole, 40 mg, oral, Daily before breakfast  perflutren lipid microspheres, 0.5-10 mL of dilution, intravenous, Once in imaging  polyethylene glycol, 17 g, oral, Daily  potassium chloride CR, 20 mEq, oral, Daily  protamine, 20 mg, intravenous, Once  [2] heparin, 0-4,500 Units/hr, Last Rate: Stopped (07/17/25 0738)  [3] PRN medications: heparin, levalbuterol, metoprolol

## 2025-07-18 LAB
ALBUMIN SERPL BCP-MCNC: 3 G/DL (ref 3.4–5)
ANION GAP SERPL CALC-SCNC: 10 MMOL/L (ref 10–20)
BUN SERPL-MCNC: 14 MG/DL (ref 6–23)
CALCIUM SERPL-MCNC: 8.4 MG/DL (ref 8.6–10.3)
CHLORIDE SERPL-SCNC: 100 MMOL/L (ref 98–107)
CO2 SERPL-SCNC: 34 MMOL/L (ref 21–32)
CREAT SERPL-MCNC: 0.69 MG/DL (ref 0.5–1.05)
EGFRCR SERPLBLD CKD-EPI 2021: >90 ML/MIN/1.73M*2
ERYTHROCYTE [DISTWIDTH] IN BLOOD BY AUTOMATED COUNT: 15.7 % (ref 11.5–14.5)
GLUCOSE SERPL-MCNC: 90 MG/DL (ref 74–99)
HCT VFR BLD AUTO: 29 % (ref 36–46)
HGB BLD-MCNC: 8.8 G/DL (ref 12–16)
MAGNESIUM SERPL-MCNC: 1.98 MG/DL (ref 1.6–2.4)
MCH RBC QN AUTO: 28.7 PG (ref 26–34)
MCHC RBC AUTO-ENTMCNC: 30.3 G/DL (ref 32–36)
MCV RBC AUTO: 95 FL (ref 80–100)
NRBC BLD-RTO: 0 /100 WBCS (ref 0–0)
PHOSPHATE SERPL-MCNC: 2.7 MG/DL (ref 2.5–4.9)
PLATELET # BLD AUTO: 168 X10*3/UL (ref 150–450)
POTASSIUM SERPL-SCNC: 4.3 MMOL/L (ref 3.5–5.3)
Q ONSET: 214 MS
QRS COUNT: 20 BEATS
QRS DURATION: 80 MS
QT INTERVAL: 336 MS
QTC CALCULATION(BAZETT): 481 MS
QTC FREDERICIA: 426 MS
R AXIS: 259 DEGREES
RBC # BLD AUTO: 3.07 X10*6/UL (ref 4–5.2)
SODIUM SERPL-SCNC: 140 MMOL/L (ref 136–145)
T AXIS: 83 DEGREES
T OFFSET: 382 MS
VENTRICULAR RATE: 123 BPM
WBC # BLD AUTO: 9.3 X10*3/UL (ref 4.4–11.3)

## 2025-07-18 PROCEDURE — 80069 RENAL FUNCTION PANEL: CPT

## 2025-07-18 PROCEDURE — 2500000002 HC RX 250 W HCPCS SELF ADMINISTERED DRUGS (ALT 637 FOR MEDICARE OP, ALT 636 FOR OP/ED)

## 2025-07-18 PROCEDURE — 99232 SBSQ HOSP IP/OBS MODERATE 35: CPT

## 2025-07-18 PROCEDURE — 2500000001 HC RX 250 WO HCPCS SELF ADMINISTERED DRUGS (ALT 637 FOR MEDICARE OP)

## 2025-07-18 PROCEDURE — 2500000005 HC RX 250 GENERAL PHARMACY W/O HCPCS: Performed by: STUDENT IN AN ORGANIZED HEALTH CARE EDUCATION/TRAINING PROGRAM

## 2025-07-18 PROCEDURE — 85027 COMPLETE CBC AUTOMATED: CPT

## 2025-07-18 PROCEDURE — 97530 THERAPEUTIC ACTIVITIES: CPT | Mod: GP

## 2025-07-18 PROCEDURE — 97535 SELF CARE MNGMENT TRAINING: CPT | Mod: GO,CO

## 2025-07-18 PROCEDURE — 36415 COLL VENOUS BLD VENIPUNCTURE: CPT

## 2025-07-18 PROCEDURE — 83735 ASSAY OF MAGNESIUM: CPT

## 2025-07-18 PROCEDURE — 94760 N-INVAS EAR/PLS OXIMETRY 1: CPT

## 2025-07-18 PROCEDURE — 1100000001 HC PRIVATE ROOM DAILY

## 2025-07-18 PROCEDURE — 2500000004 HC RX 250 GENERAL PHARMACY W/ HCPCS (ALT 636 FOR OP/ED)

## 2025-07-18 PROCEDURE — 97530 THERAPEUTIC ACTIVITIES: CPT | Mod: GO,CO

## 2025-07-18 PROCEDURE — 2500000004 HC RX 250 GENERAL PHARMACY W/ HCPCS (ALT 636 FOR OP/ED): Performed by: BEHAVIOR TECHNICIAN

## 2025-07-18 RX ORDER — LOSARTAN POTASSIUM 50 MG/1
25 TABLET ORAL DAILY
Status: DISCONTINUED | OUTPATIENT
Start: 2025-07-19 | End: 2025-07-23 | Stop reason: HOSPADM

## 2025-07-18 RX ORDER — METOPROLOL SUCCINATE 25 MG/1
25 TABLET, EXTENDED RELEASE ORAL DAILY
Status: DISCONTINUED | OUTPATIENT
Start: 2025-07-18 | End: 2025-07-23 | Stop reason: HOSPADM

## 2025-07-18 RX ADMIN — POLYETHYLENE GLYCOL 3350 17 G: 17 POWDER, FOR SOLUTION ORAL at 08:26

## 2025-07-18 RX ADMIN — APIXABAN 2.5 MG: 2.5 TABLET, FILM COATED ORAL at 08:25

## 2025-07-18 RX ADMIN — NYSTATIN 1 APPLICATION: 100000 POWDER TOPICAL at 08:29

## 2025-07-18 RX ADMIN — METOPROLOL SUCCINATE 25 MG: 25 TABLET, EXTENDED RELEASE ORAL at 15:53

## 2025-07-18 RX ADMIN — Medication 1 TABLET: at 08:25

## 2025-07-18 RX ADMIN — POTASSIUM CHLORIDE 20 MEQ: 20 TABLET, EXTENDED RELEASE ORAL at 08:25

## 2025-07-18 RX ADMIN — Medication 21 PERCENT: at 10:15

## 2025-07-18 RX ADMIN — APIXABAN 5 MG: 5 TABLET, FILM COATED ORAL at 22:10

## 2025-07-18 RX ADMIN — FLUOXETINE HYDROCHLORIDE 30 MG: 10 CAPSULE ORAL at 08:24

## 2025-07-18 RX ADMIN — FUROSEMIDE 20 MG: 20 TABLET ORAL at 08:25

## 2025-07-18 RX ADMIN — ASPIRIN 81 MG: 81 TABLET, DELAYED RELEASE ORAL at 22:10

## 2025-07-18 ASSESSMENT — COGNITIVE AND FUNCTIONAL STATUS - GENERAL
DRESSING REGULAR UPPER BODY CLOTHING: A LITTLE
EATING MEALS: A LITTLE
DAILY ACTIVITIY SCORE: 17
EATING MEALS: A LITTLE
DAILY ACTIVITIY SCORE: 18
TURNING FROM BACK TO SIDE WHILE IN FLAT BAD: A LITTLE
TOILETING: A LITTLE
TURNING FROM BACK TO SIDE WHILE IN FLAT BAD: A LITTLE
MOVING FROM LYING ON BACK TO SITTING ON SIDE OF FLAT BED WITH BEDRAILS: A LITTLE
DRESSING REGULAR LOWER BODY CLOTHING: A LOT
DRESSING REGULAR LOWER BODY CLOTHING: A LOT
MOBILITY SCORE: 16
STANDING UP FROM CHAIR USING ARMS: A LOT
MOVING TO AND FROM BED TO CHAIR: A LITTLE
PERSONAL GROOMING: A LITTLE
MOBILITY SCORE: 14
WALKING IN HOSPITAL ROOM: A LITTLE
MOVING FROM LYING ON BACK TO SITTING ON SIDE OF FLAT BED WITH BEDRAILS: A LITTLE
DRESSING REGULAR UPPER BODY CLOTHING: A LITTLE
TOILETING: A LITTLE
HELP NEEDED FOR BATHING: A LITTLE
TURNING FROM BACK TO SIDE WHILE IN FLAT BAD: A LITTLE
MOVING FROM LYING ON BACK TO SITTING ON SIDE OF FLAT BED WITH BEDRAILS: A LITTLE
WALKING IN HOSPITAL ROOM: A LOT
MOVING TO AND FROM BED TO CHAIR: A LITTLE
DRESSING REGULAR UPPER BODY CLOTHING: A LITTLE
STANDING UP FROM CHAIR USING ARMS: A LITTLE
CLIMB 3 TO 5 STEPS WITH RAILING: TOTAL
DRESSING REGULAR LOWER BODY CLOTHING: A LOT
DAILY ACTIVITIY SCORE: 17
MOVING TO AND FROM BED TO CHAIR: A LOT
WALKING IN HOSPITAL ROOM: A LOT
PERSONAL GROOMING: A LITTLE
PERSONAL GROOMING: A LITTLE
STANDING UP FROM CHAIR USING ARMS: A LITTLE
HELP NEEDED FOR BATHING: A LITTLE
TOILETING: A LITTLE
CLIMB 3 TO 5 STEPS WITH RAILING: A LOT
HELP NEEDED FOR BATHING: A LITTLE
CLIMB 3 TO 5 STEPS WITH RAILING: A LOT
MOBILITY SCORE: 16

## 2025-07-18 ASSESSMENT — PAIN - FUNCTIONAL ASSESSMENT
PAIN_FUNCTIONAL_ASSESSMENT: 0-10

## 2025-07-18 ASSESSMENT — ENCOUNTER SYMPTOMS
PSYCHIATRIC NEGATIVE: 1
GASTROINTESTINAL NEGATIVE: 1
MUSCULOSKELETAL NEGATIVE: 1
CONSTITUTIONAL NEGATIVE: 1
CARDIOVASCULAR NEGATIVE: 1
EYES NEGATIVE: 1
NEUROLOGICAL NEGATIVE: 1
RESPIRATORY NEGATIVE: 1
ENDOCRINE NEGATIVE: 1

## 2025-07-18 ASSESSMENT — PAIN SCALES - GENERAL
PAINLEVEL_OUTOF10: 0 - NO PAIN

## 2025-07-18 NOTE — PROGRESS NOTES
Occupational Therapy    OT Treatment    Patient Name: Stella Agarwal  MRN: [id number]  Department: [address]  Room: [address] [place], [place] [address]  Today's Date: 7/18/2025  Time Calculation  Start Time: 1435  Stop Time: 1458  Time Calculation (min): 23 min      Assessment:  OT Assessment: Pt presents with decreased endurance/activity tolerance impacting ADL performance and functional mobility. Reccomend low intensity OT services to maximize pt safety and independence after discharge.  Prognosis: Good  Barriers to Discharge Home: No anticipated barriers  Evaluation/Treatment Tolerance: Patient tolerated treatment well  Medical Staff Made Aware: Yes  End of Session Communication: Bedside nurse  End of Session Patient Position: Up in chair, Alarm on  OT Assessment Results: Decreased ADL status, Decreased endurance, Decreased functional mobility  Prognosis: Good  Barriers to Discharge: None  Evaluation/Treatment Tolerance: Patient tolerated treatment well  Medical Staff Made Aware: Yes  Strengths: Support of Caregivers  Barriers to Participation: Comorbidities  Plan:  Treatment Interventions: ADL retraining, Functional transfer training, Endurance training  OT Frequency: 2 times per week  OT Discharge Recommendations: Low intensity level of continued care  Equipment Recommended upon Discharge: Wheeled walker  OT Recommended Transfer Status: Assist of 1  OT - OK to Discharge: Yes  Treatment Interventions: ADL retraining, Functional transfer training, Endurance training    Subjective   OT Visit Info:  OT Received On: 07/18/25  General Visit Info:  General  Reason for Referral: Impaired functional mobility; heart failure  Referred By: Mustapha Paz  Past Medical History Relevant to Rehab: DVT on life-long eliquis, vascular dementia, multiple falls, HTN, and GERD  Family/Caregiver Present: No  Prior to Session Communication: Bedside nurse  Patient Position Received: Alarm on, Up in chair  Preferred Learning Style: verbal  General Comment: Pt  "pleasant and cooperative. SOB noted with mobility however recoved well with short break and coached pursed lip breathing. SpO2 ~90-92%  Precautions:  Medical Precautions: Fall precautions  Precautions Comment: Pacemaker placed 7/17/25    Pain:  Pain Assessment  Pain Assessment: 0-10  0-10 (Numeric) Pain Score: 0 - No pain    Objective    Cognition:  Cognition  Overall Cognitive Status: Within Functional Limits  Processing Speed: Delayed     Activities of Daily Living: LE Dressing  LE Dressing: Yes  LE Dressing Comments: While seated in chair with close supervison pt doffed and donned socks with no need of adaptive equipment.  Functional Standing Tolerance:  Functional Standing Tolerance Comments: stood ~2 mins  Bed Mobility/Transfers: Bed Mobility  Bed Mobility: No (not in bed during session)    Transfers  Transfer: Yes  Transfer 1  Transfer From 1: Sit to, Chair with arms to  Transfer to 1: Stand  Technique 1: Sit to stand  Transfer Device 1: Walker  Transfer Level of Assistance 1: Minimum assistance  Trials/Comments 1: Increased time required for initial stand. Pt stood a total of 3x, attempted a 4x however unable to clear buttock from chair on this attempt. Rest breaks provided after each stand secondary to SOB.    Functional Mobility:  Functional Mobility  Functional Mobility Performed: Yes  Functional Mobility 1  Surface 1: Level tile  Device 1: Rolling walker  Assistance 1: Contact guard  Comments 1: Pt completed functional mobility of min household distance with CGA and FWW. Pt verbalized \"i'm so weak\" during activity.  Sitting Balance:  Static Sitting Balance  Static Sitting-Balance Support: Feet supported  Static Sitting-Level of Assistance: Distant supervision  Standing Balance:  Static Standing Balance  Static Standing-Balance Support: Bilateral upper extremity supported  Static Standing-Level of Assistance: Contact guard    Outcome Measures:[facility] Daily Activity  Putting on and taking off regular lower " body clothing: A lot  Bathing (including washing, rinsing, drying): A little  Putting on and taking off regular upper body clothing: A little  Toileting, which includes using toilet, bedpan or urinal: A little  Taking care of personal grooming such as brushing teeth: A little  Eating Meals: None  Daily Activity - Total Score: 18    Education Documentation  Body Mechanics, taught by SAMIA Lee at 7/18/2025  3:49 PM.  Learner: Patient  Readiness: Acceptance  Method: Explanation  Response: Verbalizes Understanding, Demonstrated Understanding    Precautions, taught by SAMIA Lee at 7/18/2025  3:49 PM.  Learner: Patient  Readiness: Acceptance  Method: Explanation  Response: Verbalizes Understanding, Demonstrated Understanding    ADL Training, taught by SAMIA Lee at 7/18/2025  3:49 PM.  Learner: Patient  Readiness: Acceptance  Method: Explanation  Response: Verbalizes Understanding, Demonstrated Understanding    Education Comments  No comments found.           Goals:  Encounter Problems       Encounter Problems (Active)       OT Goals       Pt will tolerate 10min stand during functional task completion with no more than 1 rest break in order to increase endurance for functional task completion.  (Progressing)       Start:  07/12/25    Expected End:  07/26/25            Pt will complete [id number] transfers using LRD in preparation for ADLs with SBA  (Progressing)       Start:  07/12/25    Expected End:  07/26/25            Pt will increase endurance to tolerate 15min of OOB activity with no more than 1 rest break in order to increase ability to engage in ADL completion.  (Progressing)       Start:  07/12/25    Expected End:  07/26/25            Pt will demo LE ADL completion with supervision, using AE if needed.  (Progressing)       Start:  07/12/25    Expected End:  07/26/25            Pt will complete ileana hygiene and clothing management during toileting ADL with SBA, using DME and  adaptive strategies as neccesary  (Progressing)       Start:  07/12/25    Expected End:  07/26/25

## 2025-07-18 NOTE — PROGRESS NOTES
"Subjective    No acute events overnight.    Patient reported feeling \"good.\" Patient denies any chest pain, shortness of breath, or abdominal pain.      Objective    Temp:  [36.2 °C (97.2 °F)-36.7 °C (98.1 °F)] 36.6 °C (97.9 °F)  Heart Rate:  [69-91] 85  Resp:  [16-19] 18  BP: (108-162)/(43-65) 112/58    Physical Exam  Constitutional:       Appearance: Normal appearance.     Cardiovascular:      Rate and Rhythm: Normal rate and regular rhythm.      Heart sounds: Normal heart sounds. No murmur heard.  Pulmonary:      Effort: Pulmonary effort is normal.      Breath sounds: Normal breath sounds. No wheezing or rales.   Abdominal:      General: Abdomen is flat. There is no distension.      Palpations: Abdomen is soft. There is no mass.      Tenderness: There is no abdominal tenderness.     Musculoskeletal:      Right lower leg: No edema.      Left lower leg: No edema.     Neurological:      General: No focal deficit present.      Mental Status: She is alert and oriented to person, place, and time.     Psychiatric:         Mood and Affect: Mood normal.         Behavior: Behavior normal.         Scheduled medications  Scheduled Medications[1]  Continuous medications  Continuous Medications[2]  PRN medications  PRN Medications[3]    Results for orders placed or performed during the hospital encounter of 07/11/25 (from the past 24 hours)   ACTIVATED CLOTTING TIME LOW   Result Value Ref Range    POCT Activated Clotting Time Low Range 121 83 - 199 sec   Magnesium   Result Value Ref Range    Magnesium 1.98 1.60 - 2.40 mg/dL   Renal Function Panel   Result Value Ref Range    Glucose 90 74 - 99 mg/dL    Sodium 140 136 - 145 mmol/L    Potassium 4.3 3.5 - 5.3 mmol/L    Chloride 100 98 - 107 mmol/L    Bicarbonate 34 (H) 21 - 32 mmol/L    Anion Gap 10 10 - 20 mmol/L    Urea Nitrogen 14 6 - 23 mg/dL    Creatinine 0.69 0.50 - 1.05 mg/dL    eGFR >90 >60 mL/min/1.73m*2    Calcium 8.4 (L) 8.6 - 10.3 mg/dL    Phosphorus 2.7 2.5 - 4.9 " mg/dL    Albumin 3.0 (L) 3.4 - 5.0 g/dL   CBC   Result Value Ref Range    WBC 9.3 4.4 - 11.3 x10*3/uL    nRBC 0.0 0.0 - 0.0 /100 WBCs    RBC 3.07 (L) 4.00 - 5.20 x10*6/uL    Hemoglobin 8.8 (L) 12.0 - 16.0 g/dL    Hematocrit 29.0 (L) 36.0 - 46.0 %    MCV 95 80 - 100 fL    MCH 28.7 26.0 - 34.0 pg    MCHC 30.3 (L) 32.0 - 36.0 g/dL    RDW 15.7 (H) 11.5 - 14.5 %    Platelets 168 150 - 450 x10*3/uL       Imaging  XR chest 1 view  Result Date: 7/17/2025  1. No complicating features status post pacemaker implantation.       MACRO: None   Signed by: Nate Platt 7/17/2025 8:00 PM Dictation workstation:   [id number]    XR chest 1 view  Result Date: 7/16/2025  1. Redemonstration cardiomegaly with findings suggestive of mild-to-moderate interstitial edema, similar to slightly more pronounced on today's radiograph compared to prior radiograph dated 07/12/2025. 2. Mild interval increase in right basilar airspace opacity compared to prior radiograph dated 07/12/2025, which could be related to interval increase in right-sided pleural effusion versus pneumonia in appropriate clinical setting.   MACRO: None   Signed by: Natasha Fisher 7/16/2025 1:08 PM Dictation workstation:   [id number]    XR chest 1 view  Result Date: 7/12/2025  1. Cardiomegaly with perihilar vascular congestion, small bilateral pleural effusion and bibasal atelectasis.   Signed by: Jaleel Godinez 7/12/2025 3:48 PM Dictation workstation:   [id number]      Cardiology, Vascular, and Other Imaging  ECG 12 lead  Result Date: 7/18/2025  Atrial fibrillation with rapid ventricular response Right superior axis deviation Inferior infarct (cited on or before 11-JUL-2025) Abnormal ECG When compared with ECG of 12-JUL-2025 14:22, (unconfirmed) Vent. rate has increased BY  81 BPM Questionable change in initial forces of Inferior leads T wave inversion no longer evident in Inferior leads T wave inversion no longer evident in Anterior leads    ECG 12 lead  Result Date:  7/16/2025  Poor data quality, interpretation may be adversely affected Sinus bradycardia with Premature supraventricular complexes Left axis deviation Possible Inferior infarct (cited on or before 11-JUL-2025) Abnormal ECG When compared with ECG of 15-JUL-2025 12:27, (unconfirmed) Significant changes have occurred Sinus rhythm has replaced Atrial fibrillation Confirmed by Avtar Alves ([id number]) on 7/16/2025 11:05:02 AM    Transthoracic Echo Complete  Result Date: 7/15/2025   [facility], [address] [place] [address]               Tel [phone] and Fax [phone] TRANSTHORACIC ECHOCARDIOGRAM REPORT  Patient Name:       JAKE VALDIVIA      Reading Physician:    [id number] Avtar Alves MD Study Date:         7/15/2025           Ordering Provider:    [id number] DAVID JAMES MRN/PID:            [id number]            Fellow: Accession#:         [id number]        Nurse: Date of Birth/Age:  1950 / 74      Sonographer:          Comfort jarquin                                     [facility] Gender assigned at  F                   Additional Staff: Birth: Height:             152.40 cm           Admit Date:           7/11/2025 Weight:             84.82 kg            Admission Status:     Inpatient -                                                               Routine BSA / BMI:          1.81 m2 / 36.52     Encounter#:           [id number]                     kg/m2 Blood Pressure:     119/68 mmHg         Department Location:  [facility] Non                                                               Invasive Study Type:    TRANSTHORACIC ECHO (TTE) COMPLETE Diagnosis/ICD: Acute systolic (congestive) heart failure (CHF)-I50.21;                Bradycardia, unspecified-R00.1 Indication:    CHF, Bradycardia CPT Code:      Echo Complete w Full [id number] Patient  History: Pertinent History: Previous DVT, HTN, Syncope and Elev BNP. Study Detail: The following Echo studies were performed: 2D, M-Mode, Doppler and               color flow. The patient was awake.  PHYSICIAN INTERPRETATION: Left Ventricle: The left ventricular systolic function is normal with a visually estimated ejection fraction of 70-75%. The left ventricular cavity size is normal. There is mildly increased septal and normal posterior left ventricular wall thickness. Left ventricular diastolic filling cannot be determined due to E/A wave fusion. Left Atrium: The left atrial size was not well visualized. Right Ventricle: The right ventricle is normal in size. There is normal right ventricular global systolic function. RV free wall is not visualized. Right Atrium: The right atrial size was not well visualized. Aortic Valve: The aortic valve was not well visualized. The aortic valve area by VTI is 2.68 cmï¿½ with a peak velocity of 1.69 m/s. The peak and mean gradients are 7 mmHg and 5 mmHg, respectively with a dimensionless index of 0.69. There is indeterminate aortic valve regurgitation. Mitral Valve: The mitral valve was not well visualized. The doppler estimated peak and mean diastolic pressure gradients are 4.1 mmHg and 2 mmHg, respectively. The mean gradient of the mitral valve is 2 mmHg. Mitral valve regurgitation was not assessed. The E Vmax is 0.96 m/s. Tricuspid Valve: The tricuspid valve was not well visualized. Tricuspid regurgitation was not assessed. The right ventricular systolic pressure could not be estimated. Pulmonic Valve: The pulmonic valve is not well visualized. The pulmonic valve regurgitation was not well visualized. Pericardium: Trivial to small pericardial effusion. There is a pericardial fat pad present. Aorta: The aortic root is normal. Systemic Veins: The inferior vena cava appears normal in size, with IVC inspiratory collapse greater than 50%.  CONCLUSIONS:  1. Poorly visualized  anatomical structures due to suboptimal image quality.  2. The left ventricular systolic function is normal with a visually estimated ejection fraction of 70-75%.  3. There is normal right ventricular global systolic function. QUANTITATIVE DATA SUMMARY:  2D MEASUREMENTS:          Normal Ranges: IVSd:            1.09 cm  (0.6-1.1cm) LVPWd:           0.81 cm  (0.6-1.1cm) LVIDd:           4.42 cm  (3.9-5.9cm) LVIDs:           3.52 cm LV Mass Index:   77 g/m2 LVEDV Index:     29 ml/m2 LV % FS          20.4 %  LEFT ATRIUM:                  Normal Ranges: LA Vol A4C:        37.1 ml    (22+/-6mL/m2) LA Vol A2C:        28.6 ml LA Vol BP:         34.9 ml LA Vol Index A4C:  20.5 ml/m2 LA Vol Index A2C:  15.7 ml/m2 LA Vol Index BP:   19.2 ml/m2 LA Area A4C:       15.5 cm2 LA Area A2C:       12.7 cm2 LA Major Axis A4C: 5.5 cm LA Major Axis A2C: 4.8 cm LA Volume Index:   19.3 ml/m2 LA Vol A4C:        34.1 ml LA Vol A2C:        26.6 ml LA Vol Index BSA:  16.7 ml/m2  RIGHT ATRIUM:         Normal Ranges: RA Area A4C:  9.3 cm2  AORTA MEASUREMENTS:         Normal Ranges: Ao Sinus, d:        3.00 cm (2.1-3.5cm) Asc Ao, d:          3.20 cm (2.1-3.4cm)  LV SYSTOLIC FUNCTION:                      Normal Ranges: EF-A4C View:    86 % (>=55%) EF-A2C View:    73 % EF-Biplane:     80 % EF-Visual:      73 % LV EF Reported: 73 %  LV DIASTOLIC FUNCTION:          Normal Ranges: MV Peak E:             0.96 m/s (0.7-1.2 m/s)  MITRAL VALVE:          Normal Ranges: MV Vmax:      1.01 m/s (<=1.3m/s) MV peak P.1 mmHg (<5mmHg) MV mean P.9 mmHg (<2mmHg) MV VTI:       19.17 cm (10-13cm) MV DT:        124 msec (150-240msec)  AORTIC VALVE:                      Normal Ranges: AoV Vmax:                1.69 m/s  (<=1.7m/s) AoV Peak P.4 mmHg (<20mmHg) AoV Mean P.5 mmHg  (1.7-11.5mmHg) LVOT Max Ronny:            1.22 m/s  (<=1.1m/s) AoV VTI:                 24.89 cm  (18-25cm) LVOT VTI:                17.10 cm LVOT  Diameter:           2.23 cm   (1.8-2.4cm) AoV Area, VTI:           2.68 cm2  (2.5-5.5cm2) AoV Area,Vmax:           2.83 cm2  (2.5-4.5cm2) AoV Dimensionless Index: 0.69  RIGHT VENTRICLE: RV Basal 2.80 cm RV Mid   1.80 cm RV Major 6.1 cm TAPSE:   12.0 mm RV s'    0.12 m/s  TRICUSPID VALVE/RVSP:          Normal Ranges: Peak TR Velocity:     1.30 m/s Est. RA Pressure:     3 RV Syst Pressure:     10       (< 30mmHg) IVC Diam:             1.00 cm  PULMONIC VALVE:           Normal Ranges: PV Max Ronny:     1.6 m/s   (0.6-0.9m/s) PV Max PG:      10.1 mmHg  AORTA: Asc Ao Diam 3.17 cm  [id number] Avtar Alves MD Electronically signed on 7/15/2025 at 11:49:16 AM  ** Final **     Electrocardiogram, 12-lead PRN ACS symptoms  Result Date: 7/14/2025  Atrial fibrillation with rapid ventricular response Right superior axis deviation Pulmonary disease pattern Right ventricular hypertrophy Inferior infarct (cited on or before 11-JUL-2025) Prolonged QT Abnormal ECG When compared with ECG of 11-JUL-2025 10:19, (unconfirmed) Significant changes have occurred      Assessment & Plan    Stella Agarwal is a 74 y.o. female with a PMH of DVT on life-long eliquis, vascular dementia, multiple falls, HTN, and GERD who presented to [facility] on 7/11/2025 for syncope after mechanical fall. Found to have HFpEF and sick sinus syndrome. Admitted to medicine for diuresis iso of suspected CHF and pleural effusion. Patient developed a-fib with RVR however able to convert back to sinus rhythm with metoprolol. Cardio placed PPM and believe patient is euvolemic. If H&H remains stable likely ready for discharge tomorrow pending SNF placement.     #sick sinus syndrome s/p PPM placement  #new onset a-fib with RVR  #syncope  #mechanical fall with head injury on anticoagulation  #multiple recent falls  - CT head and c/t/l spine showed no acute processes  - EKG on admission sinus bradycardia w/o ischemic change  - 7/15 developed a-fib with RVR   - denying any chest  pain, SOB, or other symptoms  - s/p PPM placement  Plan:  - telemetry  - Cardiology following, placed PPM w/o complications, recommend starting metoprolol succinate 25 mg daily and increasing eliquis to 5 mg BID, also continue to monitor H&H  - follow up with EP on discharge  - discontinued donepezil   - PT recommend moderate intensity on dc  - OT recommend low intensity on dc     #new onset acute HFpEF exacerbation   #large right and small left pleural effusion s/p thoracentesis  - Thoracentesis done 7/12/25, CXR showed no PTX and hemothorax  - BP stable  - appears euvolemic on exam  - weight down to 187 lbs from 190 lbs on admission  - TTE showed 70% EF with normal systolic function   Plan:  - Cardiology following, recommend starting losartan 25 mg daily and continue lasix 20 mg PO daily and potassium 20 mEq PO daily  - consider starting jardiance tomorrow based on BPs  - Encourage IS and bronchial hygiene   - monitor I&Os      #intermittent nocturnal hypoxia  - witnessed apneic event, suspect CINDI  - resolved quickly with 1 L NC  Plan:  - consider night time CPAP if persistent  - recommend OP PSG/sleep med fu after discharge        RESOLVED MEDICAL ISSUES  #elevated troponin  #Dyspnea  #Hypotension     CHRONIC PROBLEMS:  #DVTs  - Resume eliquis  #HTN  - resume home losartan  #Depression  - continue home fluoxetine  #Dementia   - continue home zyprexa     Access: PIV   Antibiotics: None  Oxygenation: RA  Diet: Regular  CODE STATUS: Full code confirmed with sister (POA)     Emergency Contact: Extended Emergency Contact Information  Primary Emergency Contact: GIOVANNI REYNOSO  Mobile Phone: [phone]  Relation: Sister  Preferred language: English   needed? No  Secondary Emergency Contact: VICKIE DAIGLE  Mobile Phone: [phone]  Relation: Relative  Preferred language: English   needed? No     Dispo: SNF once medically clear, possibly tomorrow. 74 year old female admitted with new-onset CHF  requiring diuresis.      Aguilar Burton  MS4             PGY-2 Attestation: I saw and evaluated the patient.  I personally obtained the key and critical portions of the history and physical exam or was physically present for key and  critical portions performed by the resident/student. I reviewed the resident/student's documentation and discussed the patient with the resident/student.  I agree with the documentation as detailed in the note unless stated otherwise in this attestation.     Bhakti Grimaldo DO, PGY-2  Internal Medicine   7/18/25 at 1:30PM        [1] apixaban, 2.5 mg, oral, q12h  aspirin, 81 mg, oral, Daily  FLUoxetine, 30 mg, oral, Daily  [Held by provider] furosemide, 40 mg, intravenous, Daily  furosemide, 20 mg, oral, Daily  [Held by provider] losartan, 100 mg, oral, Daily  magnesium oxide, 400 mg of magnesium oxide, oral, Daily  nystatin, 1 Application, Topical, BID  OLANZapine, 10 mg, oral, Nightly  oxygen, , inhalation, Continuous - Inhalation  pantoprazole, 40 mg, oral, Daily before breakfast  perflutren lipid microspheres, 0.5-10 mL of dilution, intravenous, Once in imaging  polyethylene glycol, 17 g, oral, Daily  potassium chloride CR, 20 mEq, oral, Daily  protamine, 20 mg, intravenous, Once  [2]    [3] PRN medications: levalbuterol, metoprolol

## 2025-07-18 NOTE — PROGRESS NOTES
07/18/25 1128   Discharge Planning   Living Arrangements Other (Comment)  (Patient is from [facility])   Assistance Needed Per [facility] at baseline patient is A&OX2, Needs assistance with ADL's(prompt and standby),  Feeds self and ambulates independently with rollator. No active C agency. PCP Mustapha Billings   Type of Residence Assisted living   Do you have animals or pets at home? No   Care Facility Name [facility]   Who is requesting discharge planning? Provider   Home or Post Acute Services Post acute facilities (Rehab/SNF/etc)   Type of Post Acute Facility Services Skilled nursing   Expected Discharge Disposition SNF  (Plan is for patient to discharge to [facility] for skilled rehab, facility will be responsible for submitting the precert, facility is aware and will notify Care Coordination team when precert is obtained.)   Does the patient need discharge transport arranged? Yes   [facility] coordination needed? Yes   Has discharge transport been arranged? No   Patient Choice   Provider Choice list and CMS website (https://medicare.gov/care-compare#search) for post-acute Quality and Resource Measure Data were provided and reviewed with: Patient;Family   Patient / Family choosing to utilize agency / facility established prior to hospitalization No   Intensity of Service   Intensity of Service 0-30 min

## 2025-07-18 NOTE — CARE PLAN
The patient's goals for the shift include  remain comfortable    The clinical goals for the shift include patient will remain HDS throughout shift    Problem: Heart Failure  Goal: Improved gas exchange this shift  Outcome: Progressing     Problem: Heart Failure  Goal: Reduction in peripheral edema within 24 hours  Outcome: Progressing     Problem: Heart Failure  Goal: Report improvement of dyspnea/breathlessness this shift  Outcome: Progressing

## 2025-07-18 NOTE — PROGRESS NOTES
Department of Hospital Medicine  Progress Note    Subjective     Stella Agarwal is a 74 y.o. female on Hospital Day: 8 of admission presenting with Acute systolic heart failure and a-fib with rapid ventricular response.     She is resting well today. Denies chest pain, SOB, abdominal pain, bowel issues, and urinary issues.  She was brought back from pacemaker placement procedure because heparin was not stopped.  They will complete it later today.    Objective   Vitals:  Vitals:    07/18/25 0832 07/18/25 0955 07/18/25 1015 07/18/25 1213   BP:    112/58   BP Location:    Left arm   Pulse: 78 81  85   Resp:    18   Temp:    36.6 °C (97.9 °F)   TempSrc:    Temporal   SpO2: 96% 97% 95% 95%   Weight:       Height:           Intake/Output last 3 Shifts:  I/O last 3 completed shifts:  In: 717.1 (8.4 mL/kg) [P.O.:480; I.V.:237.1 (2.8 mL/kg)]  Out: 1200 (14 mL/kg) [Urine:1200 (0.4 mL/kg/hr)]  Weight: 85.5 kg   No intake/output data recorded.    Last stool output         Physical Exam  Constitutional:       Appearance: Normal appearance. She is obese.     Cardiovascular:      Rate and Rhythm: Normal rate and regular rhythm.      Pulses: Normal pulses.      Heart sounds: Normal heart sounds.   Pulmonary:      Effort: Pulmonary effort is normal.      Breath sounds: Normal breath sounds.     Musculoskeletal:      Right lower leg: No edema.      Left lower leg: No edema.      Comments: Compression stockings on     Skin:     General: Skin is warm and dry.      Capillary Refill: Capillary refill takes less than 2 seconds.     Neurological:      General: No focal deficit present.      Mental Status: She is alert and oriented to person, place, and time.     Psychiatric:         Mood and Affect: Mood normal.         Behavior: Behavior normal.       Review of Systems   Constitutional: Negative.   HENT: Negative.     Eyes: Negative.    Cardiovascular: Negative.    Respiratory: Negative.     Endocrine: Negative.    Skin: Negative.   "  Musculoskeletal: Negative.    Gastrointestinal: Negative.    Genitourinary: Negative.    Neurological: Negative.    Psychiatric/Behavioral: Negative.          LABS:    CBC:  Results from last 72 hours   Lab Units 07/18/25  0605 07/17/25  0638 07/16/25  0604   WBC AUTO x10*3/uL 9.3 9.2 8.0   RBC AUTO x10*6/uL 3.07* 3.52* 3.80*   HEMOGLOBIN g/dL 8.8* 10.1* 10.9*   HEMATOCRIT % 29.0* 33.0* 35.5*   MCV fL 95 94 93   MCH pg 28.7 28.7 28.7   MCHC g/dL 30.3* 30.6* 30.7*   RDW % 15.7* 15.5* 15.3*   PLATELETS AUTO x10*3/uL 168 205 205     CMP:  Results from last 72 hours   Lab Units 07/18/25  0605 07/17/25  0638 07/16/25  0604   SODIUM mmol/L 140 139 142   POTASSIUM mmol/L 4.3 4.2 3.8   CHLORIDE mmol/L 100 100 97*   CO2 mmol/L 34* 32 37*   ANION GAP mmol/L 10 11 12   BUN mg/dL 14 15 12   CREATININE mg/dL 0.69 0.75 0.69   EGFR mL/min/1.73m*2 >90 84 >90   GLUCOSE mg/dL 90 108* 95     Results from last 72 hours   Lab Units 07/18/25  0605 07/17/25  0638 07/16/25  0604   ALBUMIN g/dL 3.0* 3.1* 3.3*     Calcium/Phos:   Results from last 72 hours   Lab Units 07/18/25  0605 07/17/25  0638 07/16/25  0604   CALCIUM mg/dL 8.4* 8.4* 8.7   PHOSPHORUS mg/dL 2.7 2.6 2.3*      COAG:     CRP:   Lab Results   Component Value Date    CRP 0.41 01/12/2022       ENDO:  Recent Labs     09/04/24  0723   TSH 2.51   HGBA1C 5.5      CARDIAC:       No lab exists for component: \"CK\", \"CKMBP\"    No data recorded    MICRO/ID:   No results found for the last 90 days.             Results from last 7 days   Lab Units 07/12/25  1348   FUNGAL CULTURE/SM, MISC  Culture in progress, a report will be issued when positive or after 2 weeks of incubation.   FUNGAL STAIN  No fungal elements seen      No results found for: \"URINECULTURE\", \"BLOODCULT\", \"CSFCULTSMEAR\"    IMAGING RESULTS:   XR chest 1 view  Result Date: 7/17/2025  1. No complicating features status post pacemaker implantation.       MACRO: None   Signed by: Nate Platt 7/17/2025 8:00 PM Dictation " workstation:   [id number]      ALLERGIES PAST MEDICAL HISTORY PAST SURGICAL SURGERY FAMILY HISTORY SOCIAL HISTORY   Allergies[1] Medical History[2] Surgical History[3] Family History[4] Social History[5]         MEDS:  HOME MEDS SCHEDULED MEDS PRN IV MEDS   Current Outpatient Medications   Medication Instructions    acetaminophen (TYLENOL) 650 mg, oral, Every 4 hours PRN    alum-mag hydroxide-simeth (Mylanta) 200-200-20 mg/5 mL oral suspension 15 mL, oral, 2 times daily PRN    aspirin 81 mg EC tablet (1) TABLET BY MOUTH ONCE DAILY    benzonatate (TESSALON) 100 mg, oral, 3 times daily PRN, Do not crush or chew.    bisacodyl (ONELAX BISACODYL) 10 mg, rectal, Daily PRN    diphenhydrAMINE (BENADRYL) 25 mg, oral, Every 6 hours PRN    donepezil (ARICEPT) 5 mg, oral, Nightly    Eliquis 2.5 mg, oral, 2 times daily    FLUoxetine (PROzac) 20 mg capsule (1) CAPSULE BY MOUTH EVERY MORNING FOR ANXIETY / DEPRESSION    FLUoxetine (PROZAC) 10 mg, oral, Every morning, Take with 20mg to equal 30mg    furosemide (Lasix) 20 mg tablet (1) TABLET BY MOUTH ONCE DAILY    hydrocortisone 1 % cream 1 Application, Topical, 4 times daily PRN    ibuprofen 400 mg, oral, Every 4 hours PRN    loperamide (IMODIUM) 2 mg, oral, Daily PRN    loratadine (CLARITIN) 10 mg, oral, Daily PRN    losartan (Cozaar) 100 mg tablet (1) TABLET BY MOUTH ONCE DAILY    magnesium hydroxide (Milk of Magnesia) 400 mg/5 mL suspension 30 mL, oral, Daily PRN    metoprolol succinate XL (Toprol-XL) 50 mg 24 hr tablet (1) TABLET BY MOUTH ONCE DAILY    OLANZapine (ZyPREXA) 10 mg tablet (1) TABLET BY MOUTH AT BEDTIME PSYCHOSIS    omeprazole (PRILOSEC) 40 mg, oral, Daily    sennosides (Senokot) 8.6 mg tablet 2 tablets, oral, 2 times daily PRN    sodium phosphates (Fleet Enema) 19-7 gram/118 mL enema enema 1 enema, rectal, Daily PRN    Scheduled Medications[6] PRN Medications[7] Continuous Medications[8]          Assessment/Plan   ASSESSMENT & PLAN  Stella Agarwal is a very  pleasant 74 year old female with a history of DVT (Eliquis), vascular dementia, multiple falls, HTN and GERD, presents to the ER after a syncopal event. The syncope was not witnessed. Patient is a poor historian. This morning denies pain, unsure why she is in the hospital. Noted in the chart she was walking in her room at the nursing home with her walker and slipped. Her walker hit her in the face and fell backwards hitting her head on the floor. She denies any pain. Denies chest pain, heart palpitations or dizziness. Her daughter has noticed she has had shortness of breath for the past three days. The daughter states she has had multiple falls over the past three months. The daughter states she has a cardiologist at [facility]. She had a period of worsening bradycardia and hypotension after her thoracentesis. Resting well three days after.     Pt was found in A-fib with RVR and HR of 130's at 1037AM. She was asymptomatic at the time. Her BP was 122/76. Her Chadsvasc score was 5. Will administer 5 mg IV metoprolol and Heparin drip. Will continue to follow. She is not in a-fib on the morning of 07/16 and resting well. She was planning to have pacemaker today but heparin was not stopped appropriately. Will complete later today. Pace maker was completed and she is recovering well. Surgery site is clean and painless.       ACUTE MEDICAL ISSUES  #Acute on chronic CHF exacerbation  #Pleural Effusion  -large right and small left pleural effusion on CT scan thoracentesis 700 ml of fluid removed chest x-ray showed small bilateral pleural effusion  -Recommend continue Lasix 20 mg PO daily with Potassium 20 mg PO daily  -Recommend continue Aspirin 81 mg daily  -On physical exam patient is euvolemic    #New onset Afib w/ RVR   #Sick Sinus Syndrome   - Recommend starting losartan 25 mg daily  - Recommend starting Toprol XL 25 mg daily  - Recommend increasing Eliquis to 5 mg twice daily for A-fib  - Recommend monitoring hemoglobin  because postprocedure her hemoglobin went down to 8.8 from 10.1  - Chest x-ray shows no pneumothorax and proper pacemaker placement  - Pacemaker was checked by cardiology  - Appointment set up for electrophysiologist on August 6    #Junctional Bradycardia (Resolved)  -Recommend keeping patient off of AV lazaro blocking agents (adenosine, beta-blockers, calcium channel blockers (non-dihydropyridine), and digoxin if possible.    #Elevated troponin (Resolved)  -elevated troponin secondary to demand ischemia (resolved)      Delano Solano MD  Internal Medicine, PGY-1        [1]   Allergies  Allergen Reactions    Procaine Hcl Shortness of breath    Procaine Unknown   [2]   Past Medical History:  Diagnosis Date    CHF (congestive heart failure)     Deep vein thrombosis (DVT) of proximal vein of left lower extremity 09/18/2020    Hypertension     Schizophrenia 11/01/2013   [3]   Past Surgical History:  Procedure Laterality Date    CARDIAC ELECTROPHYSIOLOGY PROCEDURE N/A 7/17/2025    Procedure: PPM IMPLANT DUAL;  Surgeon: Beto Burgess MD;  Location[facility];  Service: Electrophysiology;  Laterality: N/A;  [employer]   [4] No family history on file.  [5]   Social History  Tobacco Use    Smoking status: Never    Smokeless tobacco: Never   Substance Use Topics    Alcohol use: Defer    Drug use: Defer   [6] apixaban, 2.5 mg, oral, q12h  aspirin, 81 mg, oral, Daily  FLUoxetine, 30 mg, oral, Daily  [Held by provider] furosemide, 40 mg, intravenous, Daily  furosemide, 20 mg, oral, Daily  [Held by provider] losartan, 100 mg, oral, Daily  magnesium oxide, 400 mg of magnesium oxide, oral, Daily  nystatin, 1 Application, Topical, BID  OLANZapine, 10 mg, oral, Nightly  oxygen, , inhalation, Continuous - Inhalation  pantoprazole, 40 mg, oral, Daily before breakfast  perflutren lipid microspheres, 0.5-10 mL of dilution, intravenous, Once in imaging  polyethylene glycol, 17 g, oral, Daily  potassium chloride CR, 20 mEq,  oral, Daily  protamine, 20 mg, intravenous, Once     [7] PRN medications: levalbuterol, metoprolol  [8]

## 2025-07-18 NOTE — PROGRESS NOTES
Physical Therapy    Physical Therapy Treatment    Patient Name: Stella Agarwal  MRN: [id number]  Department: [address]  Room: [address] [place] [address]  Today's Date: 7/18/2025  Time Calculation  Start Time: 1106  Stop Time: 1119  Time Calculation (min): 13 min         Assessment/Plan   PT Assessment  PT Assessment Results: Decreased strength, Decreased endurance, Impaired balance, Decreased mobility  Rehab Prognosis: Good  Barriers to Discharge Home: Caregiver assistance, Physical needs  Caregiver Assistance: Caregiver assistance needed per identified barriers - however, level of patient's required assistance exceeds assistance available at home  Physical Needs: Intermittent mobility assistance needed, Intermittent ADL assistance needed, High falls risk due to function or environment  Evaluation/Treatment Tolerance: Patient tolerated treatment well  Medical Staff Made Aware: Yes  Strengths: Housing layout, Support of Caregivers  Barriers to Participation: Comorbidities  End of Session Communication: Bedside nurse  Assessment Comment: Pt with slight improvement in mobility from last session. Pacemaker placed yesterday. Would continue to benefit from MOD intensity PT intervention at this time.  End of Session Patient Position: Up in chair, Alarm on  PT Plan  Inpatient/Swing Bed or Outpatient: Inpatient  PT Plan  Treatment/Interventions: Bed mobility, Transfer training, Gait training, Balance training, Strengthening, Endurance training  PT Plan: Ongoing PT  PT Frequency: 3 times per week (During inpatient hospitilization)  PT Discharge Recommendations: Moderate intensity level of continued care (Based on current functional status and rehab potential, patient is anticipated to tolerate and benefit from 5 or more days per week of skilled rehabilitative therapy after discharge from this acute inpatient hospitalization.)  Equipment Recommended upon Discharge: Wheeled walker (owns)  PT Recommended Transfer Status: Assist x1  PT - OK to  Discharge: Yes (Pt PT POC)    PT Visit Info:  PT Received On: 07/18/25  Response to Previous Treatment: Patient with no complaints from previous session.     General Visit Information:   General  Reason for Referral: Impaired functional mobility; heart failure  Referred By: Mustapha Paz  Past Medical History Relevant to Rehab: DVT on life-long eliquis, vascular dementia, multiple falls, HTN, and GERD  Family/Caregiver Present: No  Prior to Session Communication: Bedside nurse  Patient Position Received: Alarm on, Up in chair  Preferred Learning Style: verbal  General Comment: Pt pleasant and cooperative. Some SOB with mobility, SpO2 remained ~90%    Subjective   Precautions:  Precautions  Medical Precautions: Fall precautions  Precautions Comment: Pacemaker placed 7/17/25     Date/Time Vitals Session Patient Position Pulse Resp SpO2 BP MAP (mmHg)    07/18/25 0955 --  --  81  --  97 %  --  --     07/18/25 1015 --  --  --  --  95 %  --  --            Objective   Pain:  Pain Assessment  Pain Assessment: 0-10  0-10 (Numeric) Pain Score: 0 - No pain  Cognition:  Cognition  Overall Cognitive Status: Within Functional Limits       Treatments:       Bed Mobility  Bed Mobility: Yes  Bed Mobility 1  Bed Mobility 1: Supine to sitting  Level of Assistance 1: Close supervision  Bed Mobility Comments 1: HOB elevated  Bed Mobility 2  Bed Mobility  2: Sitting to supine  Level of Assistance 2: Close supervision  Bed Mobility Comments 2: HOB elevated    Ambulation/Gait Training  Ambulation/Gait Training Performed: Yes  Ambulation/Gait Training 1  Surface 1: Level tile  Device 1: Rolling walker  Assistance 1: Contact guard  Quality of Gait 1:  (Forward flexed posture, decreased step length, decreased marcelo)  Comments/Distance (ft) 1: 12'  Transfers  Transfer: Yes  Transfer 1  Transfer From 1: Sit to, Bed to  Transfer to 1: Stand  Technique 1: Sit to stand  Transfer Device 1: Walker  Transfer Level of Assistance 1: Modified  independent  Trials/Comments 1: Increased time required  Transfers 2  Transfer From 2: Stand to  Transfer to 2: Sit, Bed  Technique 2: Stand to sit  Transfer Device 2: Walker  Transfer Level of Assistance 2: Contact guard  Trials/Comments 2: Verbal cues for sequencing  Transfers 3  Transfer From 3: Sit to, Bed to  Transfer to 3: Stand  Technique 3: Sit to stand  Transfer Device 3: Walker  Transfer Level of Assistance 3: Minimum assistance  Transfers 4  Transfer From 4: Stand to  Transfer to 4: Sit, Bed  Technique 4: Stand to sit  Transfer Device 4: Walker  Transfer Level of Assistance 4: Minimum assistance    Outcome Measures:  [facility] Basic Mobility  Turning from your back to your side while in a flat bed without using bedrails: A little  Moving from lying on your back to sitting on the side of a flat bed without using bedrails: A little  Moving to and from bed to chair (including a wheelchair): A lot  Standing up from a chair using your arms (e.g. wheelchair or bedside chair): A lot  To walk in hospital room: A little  Climbing 3-5 steps with railing: Total  Basic Mobility - Total Score: 14    Education Documentation  Precautions, taught by Maricruz Umanzor PT at 7/18/2025 11:50 AM.  Learner: Patient  Readiness: Acceptance  Method: Explanation  Response: Verbalizes Understanding, Demonstrated Understanding    Mobility Training, taught by Maricruz Umanzor PT at 7/18/2025 11:50 AM.  Learner: Patient  Readiness: Acceptance  Method: Explanation  Response: Verbalizes Understanding, Demonstrated Understanding    Education Comments  No comments found.        OP EDUCATION:       Encounter Problems       Encounter Problems (Active)       Balance       STG - Maintains static standing balance with upper extremity support x 2' supervision  (Progressing)       Start:  07/13/25    Expected End:  07/27/25               Mobility       STG - Patient will ambulate with 2WW 25' CGA  (Progressing)       Start:  07/13/25    Expected  End:  07/27/25               PT Transfers       STG - Patient will perform bed mobility independently  (Progressing)       Start:  07/13/25    Expected End:  07/27/25            STG - Patient will transfer sit to and from stand supervision  (Progressing)       Start:  07/13/25    Expected End:  07/27/25

## 2025-07-19 LAB
ALBUMIN SERPL BCP-MCNC: 3.2 G/DL (ref 3.4–5)
ANION GAP SERPL CALC-SCNC: 11 MMOL/L (ref 10–20)
BUN SERPL-MCNC: 14 MG/DL (ref 6–23)
CALCIUM SERPL-MCNC: 8.8 MG/DL (ref 8.6–10.3)
CHLORIDE SERPL-SCNC: 102 MMOL/L (ref 98–107)
CO2 SERPL-SCNC: 30 MMOL/L (ref 21–32)
CREAT SERPL-MCNC: 0.54 MG/DL (ref 0.5–1.05)
EGFRCR SERPLBLD CKD-EPI 2021: >90 ML/MIN/1.73M*2
ERYTHROCYTE [DISTWIDTH] IN BLOOD BY AUTOMATED COUNT: 15.9 % (ref 11.5–14.5)
GLUCOSE SERPL-MCNC: 99 MG/DL (ref 74–99)
HCT VFR BLD AUTO: 31.9 % (ref 36–46)
HGB BLD-MCNC: 9.7 G/DL (ref 12–16)
MAGNESIUM SERPL-MCNC: 2.31 MG/DL (ref 1.6–2.4)
MCH RBC QN AUTO: 28.9 PG (ref 26–34)
MCHC RBC AUTO-ENTMCNC: 30.4 G/DL (ref 32–36)
MCV RBC AUTO: 95 FL (ref 80–100)
NRBC BLD-RTO: 0 /100 WBCS (ref 0–0)
PHOSPHATE SERPL-MCNC: 3.2 MG/DL (ref 2.5–4.9)
PLATELET # BLD AUTO: 174 X10*3/UL (ref 150–450)
POTASSIUM SERPL-SCNC: 4.5 MMOL/L (ref 3.5–5.3)
RBC # BLD AUTO: 3.36 X10*6/UL (ref 4–5.2)
SODIUM SERPL-SCNC: 138 MMOL/L (ref 136–145)
WBC # BLD AUTO: 10.7 X10*3/UL (ref 4.4–11.3)

## 2025-07-19 PROCEDURE — 2500000002 HC RX 250 W HCPCS SELF ADMINISTERED DRUGS (ALT 637 FOR MEDICARE OP, ALT 636 FOR OP/ED)

## 2025-07-19 PROCEDURE — 99232 SBSQ HOSP IP/OBS MODERATE 35: CPT

## 2025-07-19 PROCEDURE — 2500000004 HC RX 250 GENERAL PHARMACY W/ HCPCS (ALT 636 FOR OP/ED)

## 2025-07-19 PROCEDURE — 2500000001 HC RX 250 WO HCPCS SELF ADMINISTERED DRUGS (ALT 637 FOR MEDICARE OP)

## 2025-07-19 PROCEDURE — 2500000004 HC RX 250 GENERAL PHARMACY W/ HCPCS (ALT 636 FOR OP/ED): Performed by: BEHAVIOR TECHNICIAN

## 2025-07-19 PROCEDURE — 2500000005 HC RX 250 GENERAL PHARMACY W/O HCPCS: Performed by: STUDENT IN AN ORGANIZED HEALTH CARE EDUCATION/TRAINING PROGRAM

## 2025-07-19 PROCEDURE — 80069 RENAL FUNCTION PANEL: CPT

## 2025-07-19 PROCEDURE — 83735 ASSAY OF MAGNESIUM: CPT

## 2025-07-19 PROCEDURE — 2500000001 HC RX 250 WO HCPCS SELF ADMINISTERED DRUGS (ALT 637 FOR MEDICARE OP): Performed by: BEHAVIOR TECHNICIAN

## 2025-07-19 PROCEDURE — 85027 COMPLETE CBC AUTOMATED: CPT

## 2025-07-19 PROCEDURE — 1200000002 HC GENERAL ROOM WITH TELEMETRY DAILY

## 2025-07-19 PROCEDURE — 36415 COLL VENOUS BLD VENIPUNCTURE: CPT

## 2025-07-19 RX ADMIN — POTASSIUM CHLORIDE 20 MEQ: 20 TABLET, EXTENDED RELEASE ORAL at 08:51

## 2025-07-19 RX ADMIN — NYSTATIN 1 APPLICATION: 100000 POWDER TOPICAL at 22:19

## 2025-07-19 RX ADMIN — Medication 1 TABLET: at 08:51

## 2025-07-19 RX ADMIN — APIXABAN 5 MG: 5 TABLET, FILM COATED ORAL at 08:50

## 2025-07-19 RX ADMIN — FLUOXETINE HYDROCHLORIDE 30 MG: 10 CAPSULE ORAL at 09:52

## 2025-07-19 RX ADMIN — POLYETHYLENE GLYCOL 3350 17 G: 17 POWDER, FOR SOLUTION ORAL at 08:50

## 2025-07-19 RX ADMIN — METOPROLOL SUCCINATE 25 MG: 25 TABLET, EXTENDED RELEASE ORAL at 08:51

## 2025-07-19 RX ADMIN — FUROSEMIDE 20 MG: 20 TABLET ORAL at 08:51

## 2025-07-19 RX ADMIN — PANTOPRAZOLE SODIUM 40 MG: 40 TABLET, DELAYED RELEASE ORAL at 06:04

## 2025-07-19 RX ADMIN — NYSTATIN 1 APPLICATION: 100000 POWDER TOPICAL at 12:26

## 2025-07-19 RX ADMIN — LOSARTAN POTASSIUM 25 MG: 50 TABLET, FILM COATED ORAL at 08:51

## 2025-07-19 RX ADMIN — APIXABAN 5 MG: 5 TABLET, FILM COATED ORAL at 22:19

## 2025-07-19 RX ADMIN — OLANZAPINE 10 MG: 5 TABLET, FILM COATED ORAL at 22:19

## 2025-07-19 ASSESSMENT — COGNITIVE AND FUNCTIONAL STATUS - GENERAL
MOVING TO AND FROM BED TO CHAIR: A LITTLE
WALKING IN HOSPITAL ROOM: A LOT
MOVING FROM LYING ON BACK TO SITTING ON SIDE OF FLAT BED WITH BEDRAILS: A LITTLE
PERSONAL GROOMING: A LITTLE
MOBILITY SCORE: 16
DAILY ACTIVITIY SCORE: 17
TURNING FROM BACK TO SIDE WHILE IN FLAT BAD: A LITTLE
DRESSING REGULAR LOWER BODY CLOTHING: A LOT
DRESSING REGULAR UPPER BODY CLOTHING: A LITTLE
CLIMB 3 TO 5 STEPS WITH RAILING: A LOT
STANDING UP FROM CHAIR USING ARMS: A LITTLE
TOILETING: A LITTLE
HELP NEEDED FOR BATHING: A LITTLE
EATING MEALS: A LITTLE

## 2025-07-19 ASSESSMENT — PAIN - FUNCTIONAL ASSESSMENT
PAIN_FUNCTIONAL_ASSESSMENT: 0-10
PAIN_FUNCTIONAL_ASSESSMENT: 0-10

## 2025-07-19 ASSESSMENT — PAIN SCALES - GENERAL
PAINLEVEL_OUTOF10: 0 - NO PAIN
PAINLEVEL_OUTOF10: 0 - NO PAIN

## 2025-07-19 NOTE — CARE PLAN
Problem: Heart Failure  Goal: Reduction in peripheral edema within 24 hours  Outcome: Progressing     Problem: Safety - Adult  Goal: Free from fall injury  Outcome: Progressing   The patient's goals for the shift include  to discharge back to Residence today.    The clinical goals for the shift include Pt safety

## 2025-07-19 NOTE — CARE PLAN
The patient's goals for the shift include  rest.    The clinical goals for the shift include Pt safety      Problem: Heart Failure  Goal: Improved gas exchange this shift  Outcome: Progressing  Flowsheets (Taken 7/19/2025 0607)  Improved gas exchange this shift: Position to promote circulation/maximize ventilation     Problem: Safety - Adult  Goal: Free from fall injury  Outcome: Progressing     Problem: Chronic Conditions and Co-morbidities  Goal: Patient's chronic conditions and co-morbidity symptoms are monitored and maintained or improved  Outcome: Progressing  Flowsheets (Taken 7/19/2025 0607)  Care Plan - Patient's Chronic Conditions and Co-Morbidity Symptoms are Monitored and Maintained or Improved: Monitor and assess patient's chronic conditions and comorbid symptoms for stability, deterioration, or improvement

## 2025-07-19 NOTE — PROGRESS NOTES
Subjective    Stella Agarwal is a 74 y.o. female presenting for syncope and falls. Feeling much better today, conversant, pleasantly confused.    Objective    Physical Exam  Constitutional:       General: She is not in acute distress.     Appearance: Normal appearance. She is obese. She is not ill-appearing.   HENT:      Head: Normocephalic and atraumatic.      Nose: Nose normal.      Mouth/Throat:      Mouth: Mucous membranes are moist.      Pharynx: Oropharynx is clear.     Eyes:      Pupils: Pupils are equal, round, and reactive to light.       Cardiovascular:      Rate and Rhythm: Normal rate and regular rhythm.      Heart sounds: Normal heart sounds.   Pulmonary:      Effort: No respiratory distress.      Breath sounds: Normal breath sounds. No wheezing, rhonchi or rales.   Abdominal:      General: Abdomen is flat.      Palpations: Abdomen is soft.     Musculoskeletal:         General: Normal range of motion.      Right lower leg: No edema.      Left lower leg: No edema.     Skin:     General: Skin is warm and dry.      Capillary Refill: Capillary refill takes less than 2 seconds.     Neurological:      Mental Status: She is alert. Mental status is at baseline. She is disoriented.     Temp:  [36.1 °C (97 °F)-36.5 °C (97.7 °F)] 36.1 °C (97 °F)  Heart Rate:  [60-96] 62  Resp:  [16-19] 19  BP: (132-157)/(66-86) 137/70  Vitals:    07/19/25 0507   Weight: 81.5 kg (179 lb 9.6 oz)       I/Os    Intake/Output Summary (Last 24 hours) at 7/19/2025 1506  Last data filed at 7/19/2025 0507  Gross per 24 hour   Intake --   Output 1700 ml   Net -1700 ml     Labs:   CBC:  Recent Labs     07/19/25  0526 07/18/25  0605 07/17/25  0638   WBC 10.7 9.3 9.2   HGB 9.7* 8.8* 10.1*   HCT 31.9* 29.0* 33.0*    168 205   MCV 95 95 94     CMP:  Recent Labs     07/19/25  0526 07/18/25  0605 07/17/25  0638    140 139   K 4.5 4.3 4.2    100 100   CO2 30 34* 32   ANIONGAP 11 10 11   BUN 14 14 15   CREATININE 0.54 0.69 0.75    EGFR >90 >90 84   GLUCOSE 99 90 108*     Recent Labs     07/19/25  0526 07/18/25  0605 07/17/25  0638 07/11/25  1031   ALBUMIN 3.2* 3.0* 3.1* 3.8   ALKPHOS  --   --   --  60   ALT  --   --   --  7   AST  --   --   --  19   BILITOT  --   --   --  0.9    < > = values in this interval not displayed.     Calcium/Phos:   Lab Results   Component Value Date    CALCIUM 8.8 07/19/2025    PHOS 3.2 07/19/2025      COAG:   Recent Labs     07/11/25  1156   INR 2.1*     CARDIAC:   Recent Labs     07/12/25  0820 07/11/25  1649 07/11/25  1317 07/11/25  1031     --   --   --    TROPHS 16* 19* 18* 16*   BNP  --   --   --  1,206*     Micro/ID:   No results found for the last 90 days.    Imaging:  XR chest 1 view  Result Date: 7/17/2025  1. No complicating features status post pacemaker implantation.    XR chest 1 view  Result Date: 7/16/2025  1. Redemonstration cardiomegaly with findings suggestive of mild-to-moderate interstitial edema, similar to slightly more pronounced on today's radiograph compared to prior radiograph dated 07/12/2025.   2. Mild interval increase in right basilar airspace opacity compared to prior radiograph dated 07/12/2025, which could be related to interval increase in right-sided pleural effusion versus pneumonia in appropriate clinical setting.    Meds:  Scheduled medications  Scheduled Medications[1]  Continuous medications  Continuous Medications[2]  PRN medications  PRN Medications[3]     Assessment    Stella Agarwal is a 74 y.o. female     Acute medical issues  # New onset atrial fibrillation with RVR  - Metoprolol 25mg once daily  - Has improved with pacer + beta blockade    # Recurrent falls on anticoagulation  # Sick sinus syndrome s/p PPM  :: Likely secondary to syncope which was caused by sick sinus  - Ongoing physical therapy for deconditioning while inpatient  - Continue anticoagulation  - Follow up EP clinic Aug 6th    # Acute onset diastolic heart failure  - losartan 25mg daily  -  Metoprolol 25mg once daily    Resolved medical issues  # Bilateral pleural effusions s/p thoracentesis  # Dyspnea - s/p ppm  # hypotension - s/p ppm  # Syncope - s/p ppm    Chronic medical issues  # DVT - continue apixaban 5mg twice daily  # HTN - resume home losartan  # MDD - continue home fluoxetine  # Dementia - continue home olanzapine    F PO  E Replete PRN  N Cardiac  G None  DVT: Therapeutic apixaban  Abx:  None  Drips: None  Code status: Full Code  NoK: Mini Simms [phone]    Dispo: SNF pending precert    Bob Sanders MD MS  PGY3 Internal Medicine           [1] apixaban, 5 mg, oral, q12h  aspirin, 81 mg, oral, Daily  FLUoxetine, 30 mg, oral, Daily  furosemide, 20 mg, oral, Daily  losartan, 25 mg, oral, Daily  magnesium oxide, 400 mg of magnesium oxide, oral, Daily  metoprolol succinate XL, 25 mg, oral, Daily  nystatin, 1 Application, Topical, BID  OLANZapine, 10 mg, oral, Nightly  oxygen, , inhalation, Continuous - Inhalation  pantoprazole, 40 mg, oral, Daily before breakfast  perflutren lipid microspheres, 0.5-10 mL of dilution, intravenous, Once in imaging  polyethylene glycol, 17 g, oral, Daily  potassium chloride CR, 20 mEq, oral, Daily  [2]    [3] PRN medications: levalbuterol, metoprolol

## 2025-07-20 VITALS
HEIGHT: 60 IN | SYSTOLIC BLOOD PRESSURE: 124 MMHG | TEMPERATURE: 97.2 F | RESPIRATION RATE: 18 BRPM | DIASTOLIC BLOOD PRESSURE: 77 MMHG | HEART RATE: 75 BPM | OXYGEN SATURATION: 96 % | BODY MASS INDEX: 36.15 KG/M2 | WEIGHT: 184.1 LBS

## 2025-07-20 LAB
ALBUMIN SERPL BCP-MCNC: 3.2 G/DL (ref 3.4–5)
ANION GAP SERPL CALC-SCNC: 14 MMOL/L (ref 10–20)
BUN SERPL-MCNC: 16 MG/DL (ref 6–23)
CALCIUM SERPL-MCNC: 8.5 MG/DL (ref 8.6–10.3)
CHLORIDE SERPL-SCNC: 103 MMOL/L (ref 98–107)
CO2 SERPL-SCNC: 23 MMOL/L (ref 21–32)
CREAT SERPL-MCNC: 0.64 MG/DL (ref 0.5–1.05)
EGFRCR SERPLBLD CKD-EPI 2021: >90 ML/MIN/1.73M*2
ERYTHROCYTE [DISTWIDTH] IN BLOOD BY AUTOMATED COUNT: 16.5 % (ref 11.5–14.5)
GLUCOSE SERPL-MCNC: 91 MG/DL (ref 74–99)
HCT VFR BLD AUTO: 36.5 % (ref 36–46)
HGB BLD-MCNC: 10 G/DL (ref 12–16)
MAGNESIUM SERPL-MCNC: 2.6 MG/DL (ref 1.6–2.4)
MCH RBC QN AUTO: 28.3 PG (ref 26–34)
MCHC RBC AUTO-ENTMCNC: 27.4 G/DL (ref 32–36)
MCV RBC AUTO: 103 FL (ref 80–100)
NRBC BLD-RTO: 0 /100 WBCS (ref 0–0)
PHOSPHATE SERPL-MCNC: 3.8 MG/DL (ref 2.5–4.9)
PLATELET # BLD AUTO: 165 X10*3/UL (ref 150–450)
POTASSIUM SERPL-SCNC: 4.9 MMOL/L (ref 3.5–5.3)
RBC # BLD AUTO: 3.53 X10*6/UL (ref 4–5.2)
SODIUM SERPL-SCNC: 135 MMOL/L (ref 136–145)
WBC # BLD AUTO: 8.9 X10*3/UL (ref 4.4–11.3)

## 2025-07-20 PROCEDURE — 80069 RENAL FUNCTION PANEL: CPT

## 2025-07-20 PROCEDURE — 83735 ASSAY OF MAGNESIUM: CPT

## 2025-07-20 PROCEDURE — 2500000001 HC RX 250 WO HCPCS SELF ADMINISTERED DRUGS (ALT 637 FOR MEDICARE OP)

## 2025-07-20 PROCEDURE — 97116 GAIT TRAINING THERAPY: CPT | Mod: GP | Performed by: PHYSICAL THERAPIST

## 2025-07-20 PROCEDURE — 97530 THERAPEUTIC ACTIVITIES: CPT | Mod: GP | Performed by: PHYSICAL THERAPIST

## 2025-07-20 PROCEDURE — 85027 COMPLETE CBC AUTOMATED: CPT

## 2025-07-20 PROCEDURE — 2500000002 HC RX 250 W HCPCS SELF ADMINISTERED DRUGS (ALT 637 FOR MEDICARE OP, ALT 636 FOR OP/ED)

## 2025-07-20 PROCEDURE — 36415 COLL VENOUS BLD VENIPUNCTURE: CPT

## 2025-07-20 PROCEDURE — 2500000005 HC RX 250 GENERAL PHARMACY W/O HCPCS: Performed by: STUDENT IN AN ORGANIZED HEALTH CARE EDUCATION/TRAINING PROGRAM

## 2025-07-20 PROCEDURE — 1200000002 HC GENERAL ROOM WITH TELEMETRY DAILY

## 2025-07-20 PROCEDURE — 2500000004 HC RX 250 GENERAL PHARMACY W/ HCPCS (ALT 636 FOR OP/ED)

## 2025-07-20 PROCEDURE — 99232 SBSQ HOSP IP/OBS MODERATE 35: CPT

## 2025-07-20 RX ADMIN — FLUOXETINE HYDROCHLORIDE 30 MG: 10 CAPSULE ORAL at 08:47

## 2025-07-20 RX ADMIN — ASPIRIN 81 MG: 81 TABLET, DELAYED RELEASE ORAL at 08:47

## 2025-07-20 RX ADMIN — OLANZAPINE 10 MG: 5 TABLET, FILM COATED ORAL at 20:14

## 2025-07-20 RX ADMIN — APIXABAN 5 MG: 5 TABLET, FILM COATED ORAL at 08:47

## 2025-07-20 RX ADMIN — METOPROLOL SUCCINATE 25 MG: 25 TABLET, EXTENDED RELEASE ORAL at 08:48

## 2025-07-20 RX ADMIN — LOSARTAN POTASSIUM 25 MG: 50 TABLET, FILM COATED ORAL at 08:48

## 2025-07-20 RX ADMIN — NYSTATIN 1 APPLICATION: 100000 POWDER TOPICAL at 20:15

## 2025-07-20 RX ADMIN — Medication 21 PERCENT: at 22:39

## 2025-07-20 RX ADMIN — APIXABAN 5 MG: 5 TABLET, FILM COATED ORAL at 20:14

## 2025-07-20 RX ADMIN — POLYETHYLENE GLYCOL 3350 17 G: 17 POWDER, FOR SOLUTION ORAL at 08:47

## 2025-07-20 RX ADMIN — Medication 0.5 L/MIN: at 08:00

## 2025-07-20 RX ADMIN — FUROSEMIDE 20 MG: 20 TABLET ORAL at 08:48

## 2025-07-20 RX ADMIN — NYSTATIN 1 APPLICATION: 100000 POWDER TOPICAL at 08:47

## 2025-07-20 ASSESSMENT — COGNITIVE AND FUNCTIONAL STATUS - GENERAL
CLIMB 3 TO 5 STEPS WITH RAILING: A LOT
PERSONAL GROOMING: A LITTLE
MOVING FROM LYING ON BACK TO SITTING ON SIDE OF FLAT BED WITH BEDRAILS: A LITTLE
CLIMB 3 TO 5 STEPS WITH RAILING: A LOT
CLIMB 3 TO 5 STEPS WITH RAILING: A LOT
HELP NEEDED FOR BATHING: A LITTLE
WALKING IN HOSPITAL ROOM: A LOT
WALKING IN HOSPITAL ROOM: A LOT
TURNING FROM BACK TO SIDE WHILE IN FLAT BAD: A LITTLE
PERSONAL GROOMING: A LITTLE
DRESSING REGULAR UPPER BODY CLOTHING: A LITTLE
TURNING FROM BACK TO SIDE WHILE IN FLAT BAD: A LITTLE
MOBILITY SCORE: 16
HELP NEEDED FOR BATHING: A LITTLE
TURNING FROM BACK TO SIDE WHILE IN FLAT BAD: A LITTLE
WALKING IN HOSPITAL ROOM: A LOT
MOBILITY SCORE: 16
MOVING TO AND FROM BED TO CHAIR: A LITTLE
DRESSING REGULAR LOWER BODY CLOTHING: A LOT
STANDING UP FROM CHAIR USING ARMS: A LITTLE
STANDING UP FROM CHAIR USING ARMS: A LITTLE
EATING MEALS: A LITTLE
DRESSING REGULAR UPPER BODY CLOTHING: A LITTLE
MOVING FROM LYING ON BACK TO SITTING ON SIDE OF FLAT BED WITH BEDRAILS: A LITTLE
DAILY ACTIVITIY SCORE: 17
TOILETING: A LITTLE
DAILY ACTIVITIY SCORE: 17
EATING MEALS: A LITTLE
MOVING TO AND FROM BED TO CHAIR: A LITTLE
MOVING FROM LYING ON BACK TO SITTING ON SIDE OF FLAT BED WITH BEDRAILS: A LITTLE
MOVING TO AND FROM BED TO CHAIR: A LITTLE
STANDING UP FROM CHAIR USING ARMS: A LITTLE
TOILETING: A LITTLE
MOBILITY SCORE: 16
DRESSING REGULAR LOWER BODY CLOTHING: A LOT

## 2025-07-20 ASSESSMENT — PAIN SCALES - GENERAL
PAINLEVEL_OUTOF10: 0 - NO PAIN

## 2025-07-20 ASSESSMENT — PAIN - FUNCTIONAL ASSESSMENT
PAIN_FUNCTIONAL_ASSESSMENT: 0-10
PAIN_FUNCTIONAL_ASSESSMENT: 0-10

## 2025-07-20 NOTE — CARE PLAN
Problem: Heart Failure  Goal: Report improvement of dyspnea/breathlessness this shift  Outcome: Progressing  Goal: Weight from fluid excess reduced over 2-3 days, then stabilize  Outcome: Progressing     Problem: Pain - Adult  Goal: Verbalizes/displays adequate comfort level or baseline comfort level  Outcome: Progressing     Problem: Safety - Adult  Goal: Free from fall injury  Outcome: Progressing     Problem: Discharge Planning  Goal: Discharge to home or other facility with appropriate resources  Outcome: Progressing     Problem: Chronic Conditions and Co-morbidities  Goal: Patient's chronic conditions and co-morbidity symptoms are monitored and maintained or improved  Outcome: Progressing     Problem: Nutrition  Goal: Nutrient intake appropriate for maintaining nutritional needs  Outcome: Progressing     Problem: Skin  Goal: Prevent/minimize sheer/friction injuries  Outcome: Progressing  Goal: Promote/optimize nutrition  Outcome: Progressing  Goal: Promote skin healing  Outcome: Progressing   The patient's goals for the shift include  discharge back to Residence    The clinical goals for the shift include Pt safety

## 2025-07-20 NOTE — PROGRESS NOTES
Physical Therapy    Physical Therapy Treatment    Patient Name: Stella Agarwal  MRN: [id number]  Department: [address]  Room: [address] [place], [place] [address]  Today's Date: 7/20/2025  Time Calculation  Start Time: 1135  Stop Time: 1203  Time Calculation (min): 28 min         Assessment/Plan   PT Assessment  PT Assessment Results: Decreased strength, Decreased endurance, Impaired balance, Decreased mobility  Rehab Prognosis: Good  Barriers to Discharge Home: Caregiver assistance, Physical needs  Caregiver Assistance: Caregiver assistance needed per identified barriers - however, level of patient's required assistance exceeds assistance available at home  Physical Needs: Intermittent mobility assistance needed, Intermittent ADL assistance needed, High falls risk due to function or environment  Evaluation/Treatment Tolerance: Patient tolerated treatment well  Medical Staff Made Aware: Yes  Strengths: Premorbid level of function  Barriers to Participation: Comorbidities, Insight into problems  End of Session Communication: Bedside nurse  Assessment Comment: Pt demonstrates improved ability to ambulate within room, with some drop in SpO2 during ambulation, recovered quickly with seated rest break. Would continue to benefit from MOD intensity PT intervention at this time.  End of Session Patient Position: Up in chair, Alarm on  PT Plan  Inpatient/Swing Bed or Outpatient: Inpatient  PT Plan  Treatment/Interventions: Bed mobility, Transfer training, Gait training, Endurance training  PT Plan: Ongoing PT  PT Frequency: 3 times per week (During inpatient hospitilization)  PT Discharge Recommendations: Moderate intensity level of continued care (Based on current functional status and rehab potential, patient is anticipated to tolerate and benefit from 5 or more days per week of skilled rehabilitative therapy after discharge from this acute inpatient hospitalization.)  Equipment Recommended upon Discharge: Wheeled walker (owns)  PT Recommended Transfer  Status: Assist x1  PT - OK to Discharge: Yes (Pt PT POC)    PT Visit Info:  Response to Previous Treatment: Patient with no complaints from previous session.     General Visit Information:   General  Reason for Referral: Impaired functional mobility; heart failure  Referred By: Mustapha Paz  Past Medical History Relevant to Rehab: DVT on life-long eliquis, vascular dementia, multiple falls, HTN, and GERD  Family/Caregiver Present: No  Prior to Session Communication: Bedside nurse  Patient Position Received: Alarm on, Up in chair  Preferred Learning Style: verbal  General Comment: Pt pleasant and cooperative. Some dysnpea with exertion.    Subjective   Precautions:  Precautions  Medical Precautions: Fall precautions  Precautions Comment: Pacemaker placed 7/17/25      Vital Signs Comment: With ambulation pt's spO2 dropped to 86% but once returned to seated position nd deep breathing returned to >93%     Objective   Pain:  Pain Assessment  Pain Assessment: 0-10  0-10 (Numeric) Pain Score: 0 - No pain  Cognition:     Coordination:     Postural Control:     Extremity/Trunk Assessments:        Activity Tolerance:  Activity Tolerance  Endurance: Tolerates 10 - 20 min exercise with multiple rests  Treatments:       Bed Mobility  Bed Mobility: Yes  Bed Mobility 1  Bed Mobility 1: Supine to sitting  Level of Assistance 1: Close supervision    Ambulation/Gait Training  Ambulation/Gait Training Performed: Yes  Ambulation/Gait Training 1  Surface 1: Level tile  Device 1: Rolling walker  Assistance 1: Contact guard  Quality of Gait 1:  (Forward flexed posture, decreased step length, decreased marcelo)  Comments/Distance (ft) 1: x15 ft to bathroom, x20 ft bathroom to bedside chair  Transfers  Transfer: Yes  Transfer 1  Transfer From 1: Sit to, Bed to  Transfer to 1: Stand  Technique 1: Sit to stand  Transfer Device 1: Walker  Transfer Level of Assistance 1: Modified independent  Transfers 2  Transfer From 2: Stand to  Transfer to  2: Sit, Toilet  Technique 2: Stand to sit  Transfer Device 2: Walker  Transfer Level of Assistance 2: Contact guard  Trials/Comments 2: use of grab bar in bathroom  Transfers 3  Transfer From 3: Sit to  Transfer to 3: Stand  Technique 3: Sit to stand  Transfer Device 3: Walker  Transfer Level of Assistance 3: Minimum assistance  Trials/Comments 3: use of grab bar in bathroom  Transfers 4  Transfer From 4: Stand to  Transfer to 4: Sit, Bed  Technique 4: Stand to sit  Transfer Device 4: Walker  Transfer Level of Assistance 4: Minimum assistance    Outcome Measures:  [facility] Basic Mobility  Turning from your back to your side while in a flat bed without using bedrails: A little  Moving from lying on your back to sitting on the side of a flat bed without using bedrails: A little  Moving to and from bed to chair (including a wheelchair): A little  Standing up from a chair using your arms (e.g. wheelchair or bedside chair): A little  To walk in hospital room: A lot  Climbing 3-5 steps with railing: A lot  Basic Mobility - Total Score: 16    Education Documentation  Precautions, taught by Dory Ernandez PT at 7/20/2025  4:00 PM.  Learner: Patient  Readiness: Acceptance  Method: Explanation  Response: Verbalizes Understanding, Needs Reinforcement    Mobility Training, taught by Dory Ernandez PT at 7/20/2025  4:00 PM.  Learner: Patient  Readiness: Acceptance  Method: Explanation  Response: Verbalizes Understanding, Needs Reinforcement    Education Comments  No comments found.        OP EDUCATION:       Encounter Problems       Encounter Problems (Active)       Balance       STG - Maintains static standing balance with upper extremity support x 2' supervision  (Progressing)       Start:  07/13/25    Expected End:  07/27/25               Mobility       STG - Patient will ambulate with 2WW 25' CGA  (Progressing)       Start:  07/13/25    Expected End:  07/27/25               PT Transfers       STG - Patient will perform bed  mobility independently  (Progressing)       Start:  07/13/25    Expected End:  07/27/25            STG - Patient will transfer sit to and from stand supervision  (Progressing)       Start:  07/13/25    Expected End:  07/27/25

## 2025-07-20 NOTE — PROGRESS NOTES
Stella Agarwal is a 74 y.o. female on day 9 of admission presenting with Acute systolic heart failure.      Subjective   Patient was seen and examined at bedside. No acute events overnight. Patient denies new or worsening symptoms.    Objective     Last Recorded Vitals  BP (!) 122/96 (BP Location: Left arm, Patient Position: Sitting) Comment: nurse aware  Pulse 61   Temp 36.4 °C (97.5 °F) (Temporal)   Resp 18   Wt 83.5 kg (184 lb 1.6 oz)   SpO2 99%   Intake/Output last 3 Shifts:    Intake/Output Summary (Last 24 hours) at 7/20/2025 1617  Last data filed at 7/20/2025 0355  Gross per 24 hour   Intake 120 ml   Output 1050 ml   Net -930 ml       Admission Weight  Weight: 86.2 kg (190 lb) (07/11/25 1019)    Daily Weight  07/20/25 : 83.5 kg (184 lb 1.6 oz)    Image Results  ECG 12 lead  Atrial fibrillation with rapid ventricular response  Right superior axis deviation  Inferior infarct (cited on or before 11-JUL-2025)  Abnormal ECG  When compared with ECG of 12-JUL-2025 14:22, (unconfirmed)  Vent. rate has increased BY  81 BPM  Questionable change in initial forces of Inferior leads  T wave inversion no longer evident in Inferior leads  T wave inversion no longer evident in Anterior leads  Electrophysiology procedure  Table formatting from the original result was not included.  Description of the Procedure:   Dual chamber pacemaker implantation    Procedures:  Implant of dual chamber PPM (19842)    Patient history:  Please refer to the detailed history and physical on the patient's medical   chart.     Procedure narrative:  The patient was in the fasting state. A grounding pad was placed. The   patient was set up for continuous monitoring of surface 12 lead ECG and   pulse oximetry. Blood pressure was monitored. The procedure was performed   under moderate sedation. The left upper chest was prepped and draped in   the usual sterile fashion. Local anesthesia: After preoperative IV   antibiotic was completely infused,  subcutaneous tissues just medial to the   left deltopectoral area, were infiltrated with bupivacaine for local   anesthesia.   Using a scalpel, an incision was made, which was extended to the left   prepectoral fascia using blunt dissection. A pulse generator pocket was   created.  A venogram was obtained using 10cc of contrast via the left arm peripheral   IV. The images were used to guide access. Under Fluoroscopic a   Micropuncture needle was used to access the Left Axillary vein using   Seldinger technique.   A 7 F sheath was placed over of the guidewires. The RV pacing lead was   then advanced to the heart via fluoroscopic guidance. The ventricle was   mapped, and the lead was fixed to the right ventricular apex. After lead   placement, appropriate sensing and thresholds were obtained. The sheath   was peeled away.  A second 7 F sheath was advanced over the guidewire, and the dilator and   guidewire were removed. Under fluoroscopic guidance, a pacing lead was   advanced to the heart, and the atrium was mapped. The lead was fixed to   the right atrial appendage. The sheath was peeled away.  The leads were sutured in place to the pectoralis muscle using 0 Tycron   suture. Lead measurements were obtained.  A dual chamber pacemaker pulse generator was attached to the leads and   implanted using a Tyrx pouch.  The device was interrogated and its parameters recorded; telemetered   electrograms and pacing and sensing thresholds were measured.   The pocket was flushed with Vancomycin solution. The wound was closed in   three layers using. a 0 Vicryl interrupted layer, followed by a 3.0 Vicryl    continuous layer, and a 4.0 Vicryl continuous layer for the subcuticular   layer. Steri-Strips and a dressing were applied.    Final Implant Settings:  Device: Company - [employer]    Lead Parameters  Atrial: 437ohms, P wave 4mV, threshold 1.23V@0.4msec  RVent: 988ohms, R wave 20mV, threshold  0.75V@0.4msec,    Summary:  Successful implantation of a dual chamber pacemaker.    Patient Instructions:  Please do not lift left arm above shoulder level for 4-5 weeks  No repetitive motion or lifting heavy weight for 4-5 weeks  No driving, alcohol or making legal decisions for 24 hours.  Keep wound completely dry for 7 days; may shower but keep bandage as dry   as possible.  No soaking in hot tubs or baths for 10 days. May sponge bath  Keep bandage on incision for 1 week.  Allow steristrips underneath to fall off naturally.    Please call our office if you notice any discharge or swelling around   incision or fever.    Follow up:   The patient should be alert for bleeding, swelling, or signs of infection.   The patient should call the electrophysiologist immediately if symptoms   recur, or for any problems. The patient and family (with HIPPA consent)    have been instructed accordingly.   Follow up in Clinic for wound check as scheduled. Device clinic follow up   will then be scheduled. Remote monitoring will be instituted if possible.     See complete procedural log and parameters.     Complications:   None    Stents/Implants:   Implants          Pacemaker          Lead, Capsurefix Novus, 52 Cm - [id number] - Implanted        Inventory item: LEAD, CAPSUREFIX NOVUS, 52 CM Model/Cat number: 5076-52    Serial number: [id number] : [employer]    Lot number: [id number] Device identifier: [id number]    Implant Date: 7/17/2025        GUDID Information       Request status Successful        Brand name: Capsurefix® Novus Version/Model: 5076-52    Company name: [employer] MRI safety info as of 7/17/25: Labeling   does not contain MRI Safety Information    Contains dry or latex rubber: No      GMDN P.T. name: Endocardial/interventricular septal pacing lead                As of 7/17/2025       Status: Implanted                        Lead, Capsurefix Novus, 58 Cm - [id number] - Implanted         Inventory item: LEAD, CAPSUREFIX NOVUS, 58 CM Model/Cat number: 5076-58    Serial number: [id number] : [employer]    Lot number: [id number] Device identifier: [id number]    Implant Date: 7/17/2025        GUDID Information       Request status Successful        Brand name: Capsurefix® Novus Version/Model: 5076-58    Company name: [employer] MRI safety info as of 7/17/25: Labeling   does not contain MRI Safety Information    Contains dry or latex rubber: No      GMDN P.T. name: Endocardial/interventricular septal pacing lead                As of 7/17/2025       Status: Implanted                        Pacemaker, Dual Chamber, Miguel Angel Mri Xt Dr - [id number] - Implanted        Inventory item: PACEMAKER, DUAL CHAMBER, MIGUEL ANGEL MRI XT DR Model/Cat   number: W1DR01    : [employer] Lot number: [id number]    Device identifier: [id number] Implant Date: 7/17/2025      GUDID Information       Request status Successful        Brand name: Miguel Angel™ XT DR MRI SureScan™ Version/Model: W1DR01    Company name: [employer] MRI safety info as of 7/17/25: MR   Conditional    Contains dry or latex rubber: No      GMDN P.T. name: Dual-chamber implantable pacemaker, rate-responsive                As of 7/17/2025       Status: Implanted                            Estimated Blood Loss:   * No values recorded between 7/17/2025  4:05 PM and 7/17/2025  5:24 PM *    Anesthesia: Moderate Sedation Anesthesia Staff: No anesthesia staff   entered.    Any Specimen(s) Removed:   No specimens collected during this procedure.    Physical Exam  Constitutional:       General: She is not in acute distress.     Appearance: Normal appearance. She is obese. She is not ill-appearing.   HENT:      Head: Normocephalic and atraumatic.      Nose: Nose normal.      Mouth/Throat:      Mouth: Mucous membranes are moist.      Pharynx: Oropharynx is clear.      Eyes:      Pupils: Pupils are equal, round, and reactive to light.          Cardiovascular:      Rate and Rhythm: Normal rate and regular rhythm.      Heart sounds: Normal heart sounds.   Pulmonary:      Effort: No respiratory distress.      Breath sounds: Normal breath sounds. No wheezing, rhonchi or rales.   Abdominal:      General: Abdomen is flat.      Palpations: Abdomen is soft.      Musculoskeletal:         General: Normal range of motion.      Right lower leg: No edema.      Left lower leg: No edema.      Skin:     General: Skin is warm and dry.      Capillary Refill: Capillary refill takes less than 2 seconds.      Neurological:      Mental Status: She is alert. Mental status is at baseline. She is disoriented.     Relevant Results  Results for orders placed or performed during the hospital encounter of 07/11/25 (from the past 24 hours)   Magnesium   Result Value Ref Range    Magnesium 2.60 (H) 1.60 - 2.40 mg/dL   Renal Function Panel   Result Value Ref Range    Glucose 91 74 - 99 mg/dL    Sodium 135 (L) 136 - 145 mmol/L    Potassium 4.9 3.5 - 5.3 mmol/L    Chloride 103 98 - 107 mmol/L    Bicarbonate 23 21 - 32 mmol/L    Anion Gap 14 10 - 20 mmol/L    Urea Nitrogen 16 6 - 23 mg/dL    Creatinine 0.64 0.50 - 1.05 mg/dL    eGFR >90 >60 mL/min/1.73m*2    Calcium 8.5 (L) 8.6 - 10.3 mg/dL    Phosphorus 3.8 2.5 - 4.9 mg/dL    Albumin 3.2 (L) 3.4 - 5.0 g/dL   CBC   Result Value Ref Range    WBC 8.9 4.4 - 11.3 x10*3/uL    nRBC 0.0 0.0 - 0.0 /100 WBCs    RBC 3.53 (L) 4.00 - 5.20 x10*6/uL    Hemoglobin 10.0 (L) 12.0 - 16.0 g/dL    Hematocrit 36.5 36.0 - 46.0 %     (H) 80 - 100 fL    MCH 28.3 26.0 - 34.0 pg    MCHC 27.4 (L) 32.0 - 36.0 g/dL    RDW 16.5 (H) 11.5 - 14.5 %    Platelets 165 150 - 450 x10*3/uL     No results found.    Assessment & Plan  Acute systolic heart failure    Acute respiratory failure with hypoxemia    Pleural effusion    Stella Any is a 74 y.o. female      Acute medical issues  # New onset atrial fibrillation with RVR  - Metoprolol 25mg once daily  - Has  improved with pacer + beta blockade     # Recurrent falls on anticoagulation  # Sick sinus syndrome s/p PPM  :: Likely secondary to syncope which was caused by sick sinus  - Ongoing physical therapy for deconditioning while inpatient  - Continue anticoagulation  - Follow up EP clinic Aug 6th     # Acute onset diastolic heart failure  - losartan 25mg daily  - Metoprolol 25mg once daily     Resolved medical issues  # Bilateral pleural effusions s/p thoracentesis  # Dyspnea - s/p ppm  # hypotension - s/p ppm  # Syncope - s/p ppm     Chronic medical issues  # DVT - continue apixaban 5mg twice daily  # HTN - resume home losartan  # MDD - continue home fluoxetine  # Dementia - continue home olanzapine     F PO  E Replete PRN  N Cardiac  G None  DVT: Therapeutic apixaban  Abx:  None  Drips: None  Code status: Full Code  NoK: Mini Simms [phone]     Dispo: Medically clear for dc. SNF pending precert    Bhakti Grimaldo DO, PGY-2  Internal Medicine   7/20/25 at 4:18 PM.

## 2025-07-20 NOTE — CARE PLAN
Problem: Heart Failure  Goal: Report improvement of dyspnea/breathlessness this shift  Outcome: Progressing  Goal: Increase self care and/or family involvement in 24 hours  Outcome: Progressing     Problem: Pain - Adult  Goal: Verbalizes/displays adequate comfort level or baseline comfort level  Outcome: Progressing     Problem: Safety - Adult  Goal: Free from fall injury  Outcome: Progressing   The patient's goals for the shift include  get sleep     The clinical goals for the shift include pt will remain safe during shift

## 2025-07-20 NOTE — CARE PLAN
Problem: Heart Failure  Goal: Improved gas exchange this shift  Outcome: Progressing  Goal: Improved urinary output this shift  Outcome: Progressing  Goal: Reduction in peripheral edema within 24 hours  Outcome: Progressing  Goal: Report improvement of dyspnea/breathlessness this shift  Outcome: Progressing  Goal: Weight from fluid excess reduced over 2-3 days, then stabilize  Outcome: Progressing  Goal: Increase self care and/or family involvement in 24 hours  Outcome: Progressing     Problem: Pain - Adult  Goal: Verbalizes/displays adequate comfort level or baseline comfort level  Outcome: Progressing     Problem: Safety - Adult  Goal: Free from fall injury  Outcome: Progressing     Problem: Discharge Planning  Goal: Discharge to home or other facility with appropriate resources  Outcome: Progressing     Problem: Chronic Conditions and Co-morbidities  Goal: Patient's chronic conditions and co-morbidity symptoms are monitored and maintained or improved  Outcome: Progressing     Problem: Nutrition  Goal: Nutrient intake appropriate for maintaining nutritional needs  Outcome: Progressing     Problem: Skin  Goal: Decreased wound size/increased tissue granulation at next dressing change  Outcome: Progressing  Flowsheets (Taken 7/20/2025 1751)  Decreased wound size/increased tissue granulation at next dressing change: Promote sleep for wound healing  Goal: Participates in plan/prevention/treatment measures  Outcome: Progressing  Flowsheets (Taken 7/20/2025 1751)  Participates in plan/prevention/treatment measures: Elevate heels  Goal: Prevent/manage excess moisture  Outcome: Progressing  Flowsheets (Taken 7/20/2025 1751)  Prevent/manage excess moisture: Cleanse incontinence/protect with barrier cream  Goal: Prevent/minimize sheer/friction injuries  Outcome: Progressing  Flowsheets (Taken 7/20/2025 1751)  Prevent/minimize sheer/friction injuries: Complete micro-shifts as needed if patient unable. Adjust patient  position to relieve pressure points, not a full turn  Goal: Promote/optimize nutrition  Outcome: Progressing  Flowsheets (Taken 7/20/2025 1751)  Promote/optimize nutrition: Monitor/record intake including meals  Goal: Promote skin healing  Outcome: Progressing  Flowsheets (Taken 7/20/2025 1751)  Promote skin healing: Assess skin/pad under line(s)/device(s)   The patient's goals for the shift include  to get some rest     The clinical goals for the shift include pt to remain hemodynamically stable throughout the shift    Over the shift, the patient did not make progress toward the following goals. Barriers to progression include none. Recommendations to address these barriers include none.

## 2025-07-21 LAB
ALBUMIN SERPL BCP-MCNC: 3.2 G/DL (ref 3.4–5)
ANION GAP SERPL CALC-SCNC: 12 MMOL/L (ref 10–20)
BUN SERPL-MCNC: 17 MG/DL (ref 6–23)
CALCIUM SERPL-MCNC: 8.8 MG/DL (ref 8.6–10.3)
CHLORIDE SERPL-SCNC: 102 MMOL/L (ref 98–107)
CO2 SERPL-SCNC: 29 MMOL/L (ref 21–32)
CREAT SERPL-MCNC: 0.61 MG/DL (ref 0.5–1.05)
EGFRCR SERPLBLD CKD-EPI 2021: >90 ML/MIN/1.73M*2
FUNGUS SPEC CULT: NORMAL
FUNGUS SPEC FUNGUS STN: NORMAL
GLUCOSE SERPL-MCNC: 86 MG/DL (ref 74–99)
HOLD SPECIMEN: NORMAL
MAGNESIUM SERPL-MCNC: 2.12 MG/DL (ref 1.6–2.4)
PHOSPHATE SERPL-MCNC: 4.1 MG/DL (ref 2.5–4.9)
POTASSIUM SERPL-SCNC: 4.5 MMOL/L (ref 3.5–5.3)
SODIUM SERPL-SCNC: 138 MMOL/L (ref 136–145)

## 2025-07-21 PROCEDURE — 2500000002 HC RX 250 W HCPCS SELF ADMINISTERED DRUGS (ALT 637 FOR MEDICARE OP, ALT 636 FOR OP/ED)

## 2025-07-21 PROCEDURE — 36415 COLL VENOUS BLD VENIPUNCTURE: CPT

## 2025-07-21 PROCEDURE — 1200000002 HC GENERAL ROOM WITH TELEMETRY DAILY

## 2025-07-21 PROCEDURE — 99232 SBSQ HOSP IP/OBS MODERATE 35: CPT

## 2025-07-21 PROCEDURE — 83735 ASSAY OF MAGNESIUM: CPT

## 2025-07-21 PROCEDURE — 2500000001 HC RX 250 WO HCPCS SELF ADMINISTERED DRUGS (ALT 637 FOR MEDICARE OP)

## 2025-07-21 PROCEDURE — 2500000004 HC RX 250 GENERAL PHARMACY W/ HCPCS (ALT 636 FOR OP/ED): Performed by: BEHAVIOR TECHNICIAN

## 2025-07-21 PROCEDURE — 2500000005 HC RX 250 GENERAL PHARMACY W/O HCPCS: Performed by: STUDENT IN AN ORGANIZED HEALTH CARE EDUCATION/TRAINING PROGRAM

## 2025-07-21 PROCEDURE — 80069 RENAL FUNCTION PANEL: CPT

## 2025-07-21 RX ORDER — LOSARTAN POTASSIUM 25 MG/1
25 TABLET ORAL DAILY
Qty: 30 TABLET | Refills: 0 | Status: SHIPPED | OUTPATIENT
Start: 2025-07-21 | End: 2025-08-20

## 2025-07-21 RX ORDER — METOPROLOL SUCCINATE 25 MG/1
25 TABLET, EXTENDED RELEASE ORAL DAILY
Qty: 30 TABLET | Refills: 0 | Status: SHIPPED | OUTPATIENT
Start: 2025-07-21 | End: 2025-08-20

## 2025-07-21 RX ORDER — ASPIRIN 81 MG/1
81 TABLET ORAL DAILY
Qty: 30 TABLET | Refills: 0 | Status: SHIPPED | OUTPATIENT
Start: 2025-07-21

## 2025-07-21 RX ADMIN — APIXABAN 5 MG: 5 TABLET, FILM COATED ORAL at 21:20

## 2025-07-21 RX ADMIN — POTASSIUM CHLORIDE 20 MEQ: 20 TABLET, EXTENDED RELEASE ORAL at 10:53

## 2025-07-21 RX ADMIN — FUROSEMIDE 20 MG: 20 TABLET ORAL at 10:53

## 2025-07-21 RX ADMIN — OLANZAPINE 10 MG: 5 TABLET, FILM COATED ORAL at 21:20

## 2025-07-21 RX ADMIN — PANTOPRAZOLE SODIUM 40 MG: 40 TABLET, DELAYED RELEASE ORAL at 06:33

## 2025-07-21 RX ADMIN — NYSTATIN 1 APPLICATION: 100000 POWDER TOPICAL at 21:21

## 2025-07-21 RX ADMIN — ASPIRIN 81 MG: 81 TABLET, DELAYED RELEASE ORAL at 10:53

## 2025-07-21 RX ADMIN — Medication 1 TABLET: at 10:53

## 2025-07-21 RX ADMIN — APIXABAN 5 MG: 5 TABLET, FILM COATED ORAL at 10:53

## 2025-07-21 RX ADMIN — METOPROLOL SUCCINATE 25 MG: 25 TABLET, EXTENDED RELEASE ORAL at 10:52

## 2025-07-21 RX ADMIN — NYSTATIN 1 APPLICATION: 100000 POWDER TOPICAL at 10:56

## 2025-07-21 RX ADMIN — FLUOXETINE HYDROCHLORIDE 30 MG: 10 CAPSULE ORAL at 10:52

## 2025-07-21 RX ADMIN — LOSARTAN POTASSIUM 25 MG: 50 TABLET, FILM COATED ORAL at 10:52

## 2025-07-21 RX ADMIN — Medication 22 L/MIN: at 08:00

## 2025-07-21 ASSESSMENT — PAIN - FUNCTIONAL ASSESSMENT: PAIN_FUNCTIONAL_ASSESSMENT: 0-10

## 2025-07-21 ASSESSMENT — PAIN SCALES - GENERAL
PAINLEVEL_OUTOF10: 0 - NO PAIN
PAINLEVEL_OUTOF10: 0 - NO PAIN

## 2025-07-21 NOTE — PROGRESS NOTES
07/21/25 1429   Discharge Planning   Living Arrangements Other (Comment)  (Patient lives at [facility])   Support Systems Family members   Assistance Needed Per [facility] at baseline patient is A&OX2, Needs assistance with ADL's(prompt and standby),  Feeds self and ambulates independently with rollator. No active C agency. PCP Mustapha Billings   Type of Residence Assisted living   Do you have animals or pets at home? No   Care Facility Name [facility]   Who is requesting discharge planning? Provider   Home or Post Acute Services Post acute facilities (Rehab/SNF/etc)   Type of Post Acute Facility Services Skilled nursing   Expected Discharge Disposition SNF  (Plan is for the patient to discharge to [facility] awaiting precert.)   Does the patient need discharge transport arranged? Yes   [facility] coordination needed? Yes   Has discharge transport been arranged? No   Patient Choice   Provider Choice list and CMS website (https://medicare.gov/care-compare#search) for post-acute Quality and Resource Measure Data were provided and reviewed with: Patient;Family   Patient / Family choosing to utilize agency / facility established prior to hospitalization No   Intensity of Service   Intensity of Service 0-30 min

## 2025-07-21 NOTE — CONSULTS
Nutrition Note:   Nutrition Assessment       Patient is a 74 y.o. female presenting with recurrent falls on anticoagulation.    Chart reviewed for LOS screening. Pt consistently consuming % of meals. Historical weights indicate no significant weight loss over the past year. Will defer full assessment given no nutritional intervention indicated. Please re-consult if clinical status declines.    Anthropometrics:  Height: (!) 152.4 cm (5')   Weight: 81.5 kg (179 lb 9.6 oz)   BMI (Calculated): 35.08     Dietary Orders (From admission, onward)       Start     Ordered    07/17/25 1754  Adult diet Regular  Diet effective now        Question:  Diet type  Answer:  Regular    07/17/25 1753    07/11/25 1500  May Participate in Room Service With Assistance  Once        Question:  .  Answer:  Yes    07/11/25 1459             Time Spent (min): 30 minutes

## 2025-07-21 NOTE — CARE PLAN
The patient's goals for the shift include  comfort and rest    The clinical goals for the shift include Pt will remain safe and free from injury throuhgout  shift      Problem: Safety - Adult  Goal: Free from fall injury  Outcome: Progressing     Problem: Pain - Adult  Goal: Verbalizes/displays adequate comfort level or baseline comfort level  Outcome: Progressing     Problem: Chronic Conditions and Co-morbidities  Goal: Patient's chronic conditions and co-morbidity symptoms are monitored and maintained or improved  Outcome: Progressing

## 2025-07-21 NOTE — PROGRESS NOTES
"Subjective    No acute events overnight.    Patient reported feeling \"good.\" Patient denies any complaints at this time. Patients nurse has no concerns at this time. Patient had a BM yesterday.       Objective    Temp:  [36.2 °C (97.2 °F)-36.5 °C (97.7 °F)] 36.3 °C (97.3 °F)  Heart Rate:  [61-75] 62  Resp:  [16-20] 19  BP: (101-165)/(64-96) 146/64    Physical Exam  Constitutional:       Appearance: Normal appearance.     Cardiovascular:      Rate and Rhythm: Normal rate.      Heart sounds: Normal heart sounds. No murmur heard.  Pulmonary:      Effort: Pulmonary effort is normal.      Breath sounds: Normal breath sounds. No wheezing or rales.   Abdominal:      General: Abdomen is flat. There is no distension.      Palpations: Abdomen is soft. There is no mass.      Tenderness: There is no abdominal tenderness.     Musculoskeletal:      Right lower leg: No edema.      Left lower leg: No edema.     Neurological:      General: No focal deficit present.      Mental Status: She is alert. Mental status is at baseline. She is disoriented.         Scheduled medications  Scheduled Medications[1]  Continuous medications  Continuous Medications[2]  PRN medications  PRN Medications[3]    Results for orders placed or performed during the hospital encounter of 07/11/25 (from the past 24 hours)   Magnesium   Result Value Ref Range    Magnesium 2.12 1.60 - 2.40 mg/dL   Renal Function Panel   Result Value Ref Range    Glucose 86 74 - 99 mg/dL    Sodium 138 136 - 145 mmol/L    Potassium 4.5 3.5 - 5.3 mmol/L    Chloride 102 98 - 107 mmol/L    Bicarbonate 29 21 - 32 mmol/L    Anion Gap 12 10 - 20 mmol/L    Urea Nitrogen 17 6 - 23 mg/dL    Creatinine 0.61 0.50 - 1.05 mg/dL    eGFR >90 >60 mL/min/1.73m*2    Calcium 8.8 8.6 - 10.3 mg/dL    Phosphorus 4.1 2.5 - 4.9 mg/dL    Albumin 3.2 (L) 3.4 - 5.0 g/dL   Lavender Top   Result Value Ref Range    Extra Tube Hold for add-ons.        Imaging  XR chest 1 view  Result Date: 7/17/2025  1. No " complicating features status post pacemaker implantation.       MACRO: None   Signed by: Nate Platt 7/17/2025 8:00 PM Dictation workstation:   [id number]    XR chest 1 view  Result Date: 7/16/2025  1. Redemonstration cardiomegaly with findings suggestive of mild-to-moderate interstitial edema, similar to slightly more pronounced on today's radiograph compared to prior radiograph dated 07/12/2025. 2. Mild interval increase in right basilar airspace opacity compared to prior radiograph dated 07/12/2025, which could be related to interval increase in right-sided pleural effusion versus pneumonia in appropriate clinical setting.   MACRO: None   Signed by: Natasha Fisher 7/16/2025 1:08 PM Dictation workstation:   [id number]      Cardiology, Vascular, and Other Imaging  ECG 12 lead  Result Date: 7/18/2025  Atrial fibrillation with rapid ventricular response Right superior axis deviation Inferior infarct (cited on or before 11-JUL-2025) Abnormal ECG When compared with ECG of 12-JUL-2025 14:22, (unconfirmed) Vent. rate has increased BY  81 BPM Questionable change in initial forces of Inferior leads T wave inversion no longer evident in Inferior leads T wave inversion no longer evident in Anterior leads    ECG 12 lead  Result Date: 7/16/2025  Poor data quality, interpretation may be adversely affected Sinus bradycardia with Premature supraventricular complexes Left axis deviation Possible Inferior infarct (cited on or before 11-JUL-2025) Abnormal ECG When compared with ECG of 15-JUL-2025 12:27, (unconfirmed) Significant changes have occurred Sinus rhythm has replaced Atrial fibrillation Confirmed by Avtar Alves ([id number]) on 7/16/2025 11:05:02 AM    Transthoracic Echo Complete  Result Date: 7/15/2025   [facility], [address] [address]               Tel [phone] and Fax [phone] TRANSTHORACIC ECHOCARDIOGRAM REPORT  Patient Name:       JAKE VALDIVIA      Reading Physician:     [id number] Avtar Alves MD Study Date:         7/15/2025           Ordering Provider:    [id number] DAVID GONZALEZ                                                               JACOB MRN/PID:            [id number]            Fellow: Accession#:         [id number]        Nurse: Date of Birth/Age:  1950 / 74      Sonographer:          Comfort jarquin RDCS Gender assigned at  F                   Additional Staff: Birth: Height:             152.40 cm           Admit Date:           7/11/2025 Weight:             84.82 kg            Admission Status:     Inpatient -                                                               Routine BSA / BMI:          1.81 m2 / 36.52     Encounter#:           [id number]                     kg/m2 Blood Pressure:     119/68 mmHg         Department Location:  [facility] Non                                                               Invasive Study Type:    TRANSTHORACIC ECHO (TTE) COMPLETE Diagnosis/ICD: Acute systolic (congestive) heart failure (CHF)-I50.21;                Bradycardia, unspecified-R00.1 Indication:    CHF, Bradycardia CPT Code:      Echo Complete w Full Doppler-[id number] Patient History: Pertinent History: Previous DVT, HTN, Syncope and Elev BNP. Study Detail: The following Echo studies were performed: 2D, M-Mode, Doppler and               color flow. The patient was awake.  PHYSICIAN INTERPRETATION: Left Ventricle: The left ventricular systolic function is normal with a visually estimated ejection fraction of 70-75%. The left ventricular cavity size is normal. There is mildly increased septal and normal posterior left ventricular wall thickness. Left ventricular diastolic filling cannot be determined due to E/A wave fusion. Left Atrium: The left atrial size was not well visualized. Right Ventricle: The right ventricle is normal in size. There is normal right  ventricular global systolic function. RV free wall is not visualized. Right Atrium: The right atrial size was not well visualized. Aortic Valve: The aortic valve was not well visualized. The aortic valve area by VTI is 2.68 cmï¿½ with a peak velocity of 1.69 m/s. The peak and mean gradients are 7 mmHg and 5 mmHg, respectively with a dimensionless index of 0.69. There is indeterminate aortic valve regurgitation. Mitral Valve: The mitral valve was not well visualized. The doppler estimated peak and mean diastolic pressure gradients are 4.1 mmHg and 2 mmHg, respectively. The mean gradient of the mitral valve is 2 mmHg. Mitral valve regurgitation was not assessed. The E Vmax is 0.96 m/s. Tricuspid Valve: The tricuspid valve was not well visualized. Tricuspid regurgitation was not assessed. The right ventricular systolic pressure could not be estimated. Pulmonic Valve: The pulmonic valve is not well visualized. The pulmonic valve regurgitation was not well visualized. Pericardium: Trivial to small pericardial effusion. There is a pericardial fat pad present. Aorta: The aortic root is normal. Systemic Veins: The inferior vena cava appears normal in size, with IVC inspiratory collapse greater than 50%.  CONCLUSIONS:  1. Poorly visualized anatomical structures due to suboptimal image quality.  2. The left ventricular systolic function is normal with a visually estimated ejection fraction of 70-75%.  3. There is normal right ventricular global systolic function. QUANTITATIVE DATA SUMMARY:  2D MEASUREMENTS:          Normal Ranges: IVSd:            1.09 cm  (0.6-1.1cm) LVPWd:           0.81 cm  (0.6-1.1cm) LVIDd:           4.42 cm  (3.9-5.9cm) LVIDs:           3.52 cm LV Mass Index:   77 g/m2 LVEDV Index:     29 ml/m2 LV % FS          20.4 %  LEFT ATRIUM:                  Normal Ranges: LA Vol A4C:        37.1 ml    (22+/-6mL/m2) LA Vol A2C:        28.6 ml LA Vol BP:         34.9 ml LA Vol Index A4C:  20.5 ml/m2 LA Vol Index  A2C:  15.7 ml/m2 LA Vol Index BP:   19.2 ml/m2 LA Area A4C:       15.5 cm2 LA Area A2C:       12.7 cm2 LA Major Axis A4C: 5.5 cm LA Major Axis A2C: 4.8 cm LA Volume Index:   19.3 ml/m2 LA Vol A4C:        34.1 ml LA Vol A2C:        26.6 ml LA Vol Index BSA:  16.7 ml/m2  RIGHT ATRIUM:         Normal Ranges: RA Area A4C:  9.3 cm2  AORTA MEASUREMENTS:         Normal Ranges: Ao Sinus, d:        3.00 cm (2.1-3.5cm) Asc Ao, d:          3.20 cm (2.1-3.4cm)  LV SYSTOLIC FUNCTION:                      Normal Ranges: EF-A4C View:    86 % (>=55%) EF-A2C View:    73 % EF-Biplane:     80 % EF-Visual:      73 % LV EF Reported: 73 %  LV DIASTOLIC FUNCTION:          Normal Ranges: MV Peak E:             0.96 m/s (0.7-1.2 m/s)  MITRAL VALVE:          Normal Ranges: MV Vmax:      1.01 m/s (<=1.3m/s) MV peak P.1 mmHg (<5mmHg) MV mean P.9 mmHg (<2mmHg) MV VTI:       19.17 cm (10-13cm) MV DT:        124 msec (150-240msec)  AORTIC VALVE:                      Normal Ranges: AoV Vmax:                1.69 m/s  (<=1.7m/s) AoV Peak P.4 mmHg (<20mmHg) AoV Mean P.5 mmHg  (1.7-11.5mmHg) LVOT Max Ronny:            1.22 m/s  (<=1.1m/s) AoV VTI:                 24.89 cm  (18-25cm) LVOT VTI:                17.10 cm LVOT Diameter:           2.23 cm   (1.8-2.4cm) AoV Area, VTI:           2.68 cm2  (2.5-5.5cm2) AoV Area,Vmax:           2.83 cm2  (2.5-4.5cm2) AoV Dimensionless Index: 0.69  RIGHT VENTRICLE: RV Basal 2.80 cm RV Mid   1.80 cm RV Major 6.1 cm TAPSE:   12.0 mm RV s'    0.12 m/s  TRICUSPID VALVE/RVSP:          Normal Ranges: Peak TR Velocity:     1.30 m/s Est. RA Pressure:     3 RV Syst Pressure:     10       (< 30mmHg) IVC Diam:             1.00 cm  PULMONIC VALVE:           Normal Ranges: PV Max Ronny:     1.6 m/s   (0.6-0.9m/s) PV Max PG:      10.1 mmHg  AORTA: Asc Ao Diam 3.17 cm  [id number] Avtar Alves MD Electronically signed on 7/15/2025 at 11:49:16 AM  ** Final **       Assessment &  Devon Agarwal is a 74 y.o. female. Medically clear for discharge to SNF, precert pending.      Acute medical issues  # Recurrent falls on anticoagulation  # Sick sinus syndrome s/p PPM  :: Likely secondary to syncope which was caused by sick sinus  - Ongoing physical therapy for deconditioning while inpatient  - Continue anticoagulation  - Follow up EP clinic Aug 6th    Resolved medical issues  # Bilateral pleural effusions s/p thoracentesis    # New onset atrial fibrillation with RVR (resolved)  - Metoprolol 25mg once daily  - Has improved with pacer + beta blockade     Chronic medical issues  # DVT - continue apixaban 5mg twice daily  # HTN - resume home losartan  # MDD - continue home fluoxetine  # Dementia - continue home olanzapine    # HFpEF  - losartan 25mg daily  - Metoprolol 25mg once daily       F PO  E Replete PRN  N Cardiac  G None  DVT: Therapeutic apixaban  Abx:  None  Drips: None  Code status: Full Code  NoK: Mini Simms [phone]     Dispo: Medically clear for dc. SNF pending precert    Aguilar Burton  MS4         PGY-2 Attestation: I saw and evaluated the patient.  I personally obtained the key and critical portions of the history and physical exam or was physically present for key and  critical portions performed by the resident/student. I reviewed the resident/student's documentation and discussed the patient with the resident/student.  I agree with the documentation as detailed in the note unless stated otherwise in this attestation.       Bhakti Grimaldo DO, PGY-2  Internal Medicine   7/21/25 at 1:36 PM.           [1] apixaban, 5 mg, oral, q12h  aspirin, 81 mg, oral, Daily  FLUoxetine, 30 mg, oral, Daily  furosemide, 20 mg, oral, Daily  losartan, 25 mg, oral, Daily  magnesium oxide, 400 mg of magnesium oxide, oral, Daily  metoprolol succinate XL, 25 mg, oral, Daily  nystatin, 1 Application, Topical, BID  OLANZapine, 10 mg, oral, Nightly  oxygen, , inhalation, Continuous -  Inhalation  pantoprazole, 40 mg, oral, Daily before breakfast  perflutren lipid microspheres, 0.5-10 mL of dilution, intravenous, Once in imaging  polyethylene glycol, 17 g, oral, Daily  potassium chloride CR, 20 mEq, oral, Daily  [2]    [3] PRN medications: levalbuterol, metoprolol

## 2025-07-21 NOTE — CARE PLAN
Problem: Pain - Adult  Goal: Verbalizes/displays adequate comfort level or baseline comfort level  Outcome: Progressing     Problem: Safety - Adult  Goal: Free from fall injury  Outcome: Progressing   The patient's goals for the shift include      The clinical goals for the shift include participate in care this shift    Over the shift, the patient sat in the chair and got a bath

## 2025-07-21 NOTE — DISCHARGE SUMMARY
Discharge Diagnosis  HFpEF  Pleural Effusion   Sick sinus syndrome s/p PPM placement   pAF  Mechanical fall with head injury on anticoagulation     Issues Requiring Follow-Up  Sick sinus syndrome s/p PPM placement  HFpEF; pAF    Discharge Meds     Medication List      CHANGE how you take these medications     apixaban 5 mg tablet; Commonly known as: Eliquis; Take 1 tablet (5 mg)   by mouth every 12 hours.; What changed: medication strength, how much to   take, when to take this   aspirin 81 mg EC tablet; Take 1 tablet (81 mg) by mouth once daily.;   What changed: See the new instructions.   * FLUoxetine 10 mg capsule; Commonly known as: PROzac; What changed:   Another medication with the same name was changed. Make sure you   understand how and when to take each.   * FLUoxetine 20 mg capsule; Commonly known as: PROzac; (1) CAPSULE BY   MOUTH EVERY MORNING FOR ANXIETY / DEPRESSION; What changed: See the new   instructions.   losartan 25 mg tablet; Commonly known as: Cozaar; Take 1 tablet (25 mg)   by mouth once daily.; What changed: medication strength, See the new   instructions.   metoprolol succinate XL 25 mg 24 hr tablet; Commonly known as:   Toprol-XL; Take 1 tablet (25 mg) by mouth once daily. Do not crush or   chew.; What changed: medication strength, See the new instructions.  * This list has 2 medication(s) that are the same as other medications   prescribed for you. Read the directions carefully, and ask your doctor or   other care provider to review them with you.     CONTINUE taking these medications     acetaminophen 325 mg tablet; Commonly known as: Tylenol   alum-mag hydroxide-simeth 200-200-20 mg/5 mL oral suspension; Commonly   known as: Mylanta   diphenhydrAMINE 25 mg capsule; Commonly known as: BENADryl   donepezil 5 mg tablet; Commonly known as: Aricept   furosemide 20 mg tablet; Commonly known as: Lasix; (1) TABLET BY MOUTH   ONCE DAILY   hydrocortisone 1 % cream   loperamide 2 mg capsule;  Commonly known as: Imodium   loratadine 10 mg tablet; Commonly known as: Claritin   magnesium hydroxide 400 mg/5 mL suspension; Commonly known as: Milk of   Magnesia   OLANZapine 10 mg tablet; Commonly known as: ZyPREXA; (1) TABLET BY MOUTH   AT BEDTIME PSYCHOSIS   omeprazole 40 mg DR capsule; Commonly known as: PriLOSEC; Take 1 capsule   (40 mg) by mouth once daily.   OneLAX bisacodyL 10 mg suppository; Generic drug: bisacodyl   sennosides 8.6 mg tablet; Commonly known as: Senokot       Test Results Pending At Discharge  Pending Labs       Order Current Status    Extra Tubes In process    Lavender Top In process    AFB Culture/Smear Preliminary result    Fungal Culture/Smear Preliminary result            Hospital Course  Brief HPI: Stella Agarwal is a 74 y.o. female with a PMH of DVT on life-long eliquis, vascular dementia, multiple falls, HTN, and GERD who presented to [facility] on 7/11/[facility] for syncope. Of note there were no witnesses of event and patient is unreliable historian. Patient states she was walking in her room at nursing home with walker when she slipped. Her walker hit her in the face and she fell backwards, hitting the back of her head on the floor. She then lost consciousness for what she estimates to be a few minutes. When she awoke, she states she had no confusion. She endorses pain in her left leg which she hit during her fall, but denies any pain in her head, neck, or anywhere else. She denies any chest pain, abdominal pain, confusion, weakness, numbness, constipation, or diarrhea. She also denies any dizziness or precipitating symptoms before her fall.     ED Course: Vitals: T 98.2, /97, HR 46, RR 18, SpO2 84% on RA (resolved with 2 L NC)     Labs: CMP and CBC unremarkable, BNP 1206, trop 16, UA unremarkable     Imaging:  EKG showed sinus bradycardia with single PVC, LAD, low voltage QRS     CTCAP showed large right and small left pleural effusions, near complete collapse of the right  lower lobe, cardiomegaly, and trace ascites     CT head and cervical/thoracic/lumbar spine unremarkable  Interventions: 2 L NC, lasix 40 IV    Floor:  Metoprolol was held and patient was diuresed. Pulmonology was consulted and thoracentesis performed on 7/12. Cardio was consulted given concern of syncope with bradycardia, likely sick sinus syndrome. While diuresing patient, she developed a-fib with RVR requiring rate control with metoprolol. Cardiology elected to place permanent pacemaker given need for rate control and presence of sick sinus syndrome. She tolerated the procedure well and was diuresed until euvolemic. Remained hemodynamically stable for remainder of stay. Follow up with EP for her PPM and a-fib. Follow up with  cardiologist for HFpEF.     Pertinent Physical Exam At Time of Discharge  Physical Exam  Constitutional:       General: She is not in acute distress.     Appearance: Normal appearance. She is obese. She is not ill-appearing.   HENT:      Head: Normocephalic and atraumatic.      Nose: Nose normal.      Mouth/Throat:      Mouth: Mucous membranes are moist.      Pharynx: Oropharynx is clear.   Eyes:      Pupils: Pupils are equal, round, and reactive to light.   Cardiovascular:      Rate and Rhythm: Normal rate and regular rhythm.      Heart sounds: Normal heart sounds.   Pulmonary:      Effort: No respiratory distress.      Breath sounds: Normal breath sounds. No wheezing, rhonchi or rales.   Abdominal:      General: Abdomen is flat.      Palpations: Abdomen is soft.   Musculoskeletal:         General: Normal range of motion.      Right lower leg: No edema.      Left lower leg: No edema.   Skin:     General: Skin is warm and dry.      Capillary Refill: Capillary refill takes less than 2 seconds.   Neurological:      Mental Status: She is alert. Mental status is at baseline. She is disoriented    Outpatient Follow-Up  Future Appointments   Date Time Provider Department Center   8/7/2025  3:20 PM  [facility]         Madaikevin Grimaldo DO

## 2025-07-22 PROCEDURE — 2500000004 HC RX 250 GENERAL PHARMACY W/ HCPCS (ALT 636 FOR OP/ED): Performed by: BEHAVIOR TECHNICIAN

## 2025-07-22 PROCEDURE — 99232 SBSQ HOSP IP/OBS MODERATE 35: CPT

## 2025-07-22 PROCEDURE — 97110 THERAPEUTIC EXERCISES: CPT | Mod: GP,CQ

## 2025-07-22 PROCEDURE — 2500000002 HC RX 250 W HCPCS SELF ADMINISTERED DRUGS (ALT 637 FOR MEDICARE OP, ALT 636 FOR OP/ED)

## 2025-07-22 PROCEDURE — 2500000001 HC RX 250 WO HCPCS SELF ADMINISTERED DRUGS (ALT 637 FOR MEDICARE OP)

## 2025-07-22 PROCEDURE — 97535 SELF CARE MNGMENT TRAINING: CPT | Mod: GO,CO

## 2025-07-22 PROCEDURE — 1200000002 HC GENERAL ROOM WITH TELEMETRY DAILY

## 2025-07-22 PROCEDURE — 2500000005 HC RX 250 GENERAL PHARMACY W/O HCPCS: Performed by: STUDENT IN AN ORGANIZED HEALTH CARE EDUCATION/TRAINING PROGRAM

## 2025-07-22 PROCEDURE — 97116 GAIT TRAINING THERAPY: CPT | Mod: GP,CQ

## 2025-07-22 PROCEDURE — 2500000004 HC RX 250 GENERAL PHARMACY W/ HCPCS (ALT 636 FOR OP/ED)

## 2025-07-22 RX ADMIN — APIXABAN 5 MG: 5 TABLET, FILM COATED ORAL at 08:53

## 2025-07-22 RX ADMIN — POTASSIUM CHLORIDE 20 MEQ: 20 TABLET, EXTENDED RELEASE ORAL at 08:53

## 2025-07-22 RX ADMIN — Medication 0.5 L/MIN: at 09:01

## 2025-07-22 RX ADMIN — PANTOPRAZOLE SODIUM 40 MG: 40 TABLET, DELAYED RELEASE ORAL at 06:16

## 2025-07-22 RX ADMIN — NYSTATIN 1 APPLICATION: 100000 POWDER TOPICAL at 09:01

## 2025-07-22 RX ADMIN — APIXABAN 5 MG: 5 TABLET, FILM COATED ORAL at 20:25

## 2025-07-22 RX ADMIN — FLUOXETINE HYDROCHLORIDE 30 MG: 10 CAPSULE ORAL at 08:52

## 2025-07-22 RX ADMIN — NYSTATIN 1 APPLICATION: 100000 POWDER TOPICAL at 20:27

## 2025-07-22 RX ADMIN — ASPIRIN 81 MG: 81 TABLET, DELAYED RELEASE ORAL at 08:52

## 2025-07-22 RX ADMIN — Medication 21 PERCENT: at 03:42

## 2025-07-22 RX ADMIN — LOSARTAN POTASSIUM 25 MG: 50 TABLET, FILM COATED ORAL at 08:53

## 2025-07-22 RX ADMIN — Medication 1 TABLET: at 08:53

## 2025-07-22 RX ADMIN — METOPROLOL SUCCINATE 25 MG: 25 TABLET, EXTENDED RELEASE ORAL at 08:52

## 2025-07-22 RX ADMIN — FUROSEMIDE 20 MG: 20 TABLET ORAL at 08:53

## 2025-07-22 RX ADMIN — OLANZAPINE 10 MG: 5 TABLET, FILM COATED ORAL at 20:25

## 2025-07-22 RX ADMIN — POLYETHYLENE GLYCOL 3350 17 G: 17 POWDER, FOR SOLUTION ORAL at 08:52

## 2025-07-22 ASSESSMENT — COGNITIVE AND FUNCTIONAL STATUS - GENERAL
HELP NEEDED FOR BATHING: A LITTLE
MOVING TO AND FROM BED TO CHAIR: A LITTLE
MOVING FROM LYING ON BACK TO SITTING ON SIDE OF FLAT BED WITH BEDRAILS: A LITTLE
MOVING TO AND FROM BED TO CHAIR: A LITTLE
STANDING UP FROM CHAIR USING ARMS: A LITTLE
TOILETING: A LOT
CLIMB 3 TO 5 STEPS WITH RAILING: A LOT
EATING MEALS: A LITTLE
WALKING IN HOSPITAL ROOM: A LITTLE
TURNING FROM BACK TO SIDE WHILE IN FLAT BAD: A LITTLE
MOBILITY SCORE: 16
DRESSING REGULAR LOWER BODY CLOTHING: A LOT
DRESSING REGULAR UPPER BODY CLOTHING: A LOT
EATING MEALS: A LITTLE
DAILY ACTIVITIY SCORE: 13
HELP NEEDED FOR BATHING: A LOT
CLIMB 3 TO 5 STEPS WITH RAILING: A LOT
TOILETING: A LOT
STANDING UP FROM CHAIR USING ARMS: A LOT
HELP NEEDED FOR BATHING: A LOT
WALKING IN HOSPITAL ROOM: A LOT
DRESSING REGULAR LOWER BODY CLOTHING: A LITTLE
TOILETING: A LITTLE
MOVING TO AND FROM BED TO CHAIR: A LITTLE
DAILY ACTIVITIY SCORE: 13
DRESSING REGULAR UPPER BODY CLOTHING: A LOT
PERSONAL GROOMING: A LOT
PERSONAL GROOMING: A LOT
WALKING IN HOSPITAL ROOM: A LOT
MOBILITY SCORE: 17
TURNING FROM BACK TO SIDE WHILE IN FLAT BAD: A LITTLE
MOBILITY SCORE: 17
DRESSING REGULAR UPPER BODY CLOTHING: A LITTLE
TURNING FROM BACK TO SIDE WHILE IN FLAT BAD: A LITTLE
PERSONAL GROOMING: A LITTLE
STANDING UP FROM CHAIR USING ARMS: A LITTLE
DAILY ACTIVITIY SCORE: 19
DRESSING REGULAR LOWER BODY CLOTHING: A LOT
CLIMB 3 TO 5 STEPS WITH RAILING: A LOT

## 2025-07-22 ASSESSMENT — PAIN - FUNCTIONAL ASSESSMENT
PAIN_FUNCTIONAL_ASSESSMENT: 0-10

## 2025-07-22 ASSESSMENT — PAIN SCALES - GENERAL
PAINLEVEL_OUTOF10: 0 - NO PAIN

## 2025-07-22 ASSESSMENT — ACTIVITIES OF DAILY LIVING (ADL): HOME_MANAGEMENT_TIME_ENTRY: 15

## 2025-07-22 NOTE — PROGRESS NOTES
Stella Agarwal is a 74 y.o. female on day 11 of admission presenting with Acute systolic heart failure.      Subjective   No acute events overnight. Patient denies any current complaints. Denies CP, SOB, palpitations.     Objective     Last Recorded Vitals  /52 (BP Location: Left arm, Patient Position: Sitting) Comment: nurse aware  Pulse 62   Temp 36.5 °C (97.7 °F) (Temporal)   Resp 16   Wt 81.9 kg (180 lb 8 oz)   SpO2 98%   Intake/Output last 3 Shifts:    Intake/Output Summary (Last 24 hours) at 7/22/2025 1346  Last data filed at 7/22/2025 1032  Gross per 24 hour   Intake 240 ml   Output 2400 ml   Net -2160 ml       Admission Weight  Weight: 86.2 kg (190 lb) (07/11/25 1019)    Daily Weight  07/22/25 : 81.9 kg (180 lb 8 oz)    Image Results  ECG 12 lead  Atrial fibrillation with rapid ventricular response  Right superior axis deviation  Inferior infarct (cited on or before 11-JUL-2025)  Abnormal ECG  When compared with ECG of 12-JUL-2025 14:22, (unconfirmed)  Vent. rate has increased BY  81 BPM  Questionable change in initial forces of Inferior leads  T wave inversion no longer evident in Inferior leads  T wave inversion no longer evident in Anterior leads  Electrophysiology procedure  Table formatting from the original result was not included.  Description of the Procedure:   Dual chamber pacemaker implantation    Procedures:  Implant of dual chamber PPM ([id number])    Patient history:  Please refer to the detailed history and physical on the patient's medical   chart.     Procedure narrative:  The patient was in the fasting state. A grounding pad was placed. The   patient was set up for continuous monitoring of surface 12 lead ECG and   pulse oximetry. Blood pressure was monitored. The procedure was performed   under moderate sedation. The left upper chest was prepped and draped in   the usual sterile fashion. Local anesthesia: After preoperative IV   antibiotic was completely infused, subcutaneous tissues  just medial to the   left deltopectoral area, were infiltrated with bupivacaine for local   anesthesia.   Using a scalpel, an incision was made, which was extended to the left   prepectoral fascia using blunt dissection. A pulse generator pocket was   created.  A venogram was obtained using 10cc of contrast via the left arm peripheral   IV. The images were used to guide access. Under Fluoroscopic a   Micropuncture needle was used to access the Left Axillary vein using   Seldinger technique.   A 7 F sheath was placed over of the guidewires. The RV pacing lead was   then advanced to the heart via fluoroscopic guidance. The ventricle was   mapped, and the lead was fixed to the right ventricular apex. After lead   placement, appropriate sensing and thresholds were obtained. The sheath   was peeled away.  A second 7 F sheath was advanced over the guidewire, and the dilator and   guidewire were removed. Under fluoroscopic guidance, a pacing lead was   advanced to the heart, and the atrium was mapped. The lead was fixed to   the right atrial appendage. The sheath was peeled away.  The leads were sutured in place to the pectoralis muscle using 0 Tycron   suture. Lead measurements were obtained.  A dual chamber pacemaker pulse generator was attached to the leads and   implanted using a Tyrx pouch.  The device was interrogated and its parameters recorded; telemetered   electrograms and pacing and sensing thresholds were measured.   The pocket was flushed with Vancomycin solution. The wound was closed in   three layers using. a 0 Vicryl interrupted layer, followed by a 3.0 Vicryl    continuous layer, and a 4.0 Vicryl continuous layer for the subcuticular   layer. Steri-Strips and a dressing were applied.    Final Implant Settings:  Device: Company - [employer]    Lead Parameters  Atrial: 437ohms, P wave 4mV, threshold 1.23V@0.4msec  RVent: 988ohms, R wave 20mV, threshold 0.75V@0.4msec,    Summary:  Successful implantation of a  dual chamber pacemaker.    Patient Instructions:  Please do not lift left arm above shoulder level for 4-5 weeks  No repetitive motion or lifting heavy weight for 4-5 weeks  No driving, alcohol or making legal decisions for 24 hours.  Keep wound completely dry for 7 days; may shower but keep bandage as dry   as possible.  No soaking in hot tubs or baths for 10 days. May sponge bath  Keep bandage on incision for 1 week.  Allow steristrips underneath to fall off naturally.    Please call our office if you notice any discharge or swelling around   incision or fever.    Follow up:   The patient should be alert for bleeding, swelling, or signs of infection.   The patient should call the electrophysiologist immediately if symptoms   recur, or for any problems. The patient and family (with HIPPA consent)    have been instructed accordingly.   Follow up in Clinic for wound check as scheduled. Device clinic follow up   will then be scheduled. Remote monitoring will be instituted if possible.     See complete procedural log and parameters.     Complications:   None    Stents/Implants:   Implants          Pacemaker          Lead, Capsurefix Novus, 52 Cm - [id number] - Implanted        Inventory item: LEAD, CAPSUREFIX NOVUS, 52 CM Model/Cat number: 5076-52    Serial number: [id number] : [employer]    Lot number: [id number] Device identifier: [id number]    Implant Date: 7/17/2025        GUDID Information       Request status Successful        Brand name: Capsurefix® Novus Version/Model: 5076-52    Company name: [employer] MRI safety info as of 7/17/25: Labeling   does not contain MRI Safety Information    Contains dry or latex rubber: No      GMDN P.T. name: Endocardial/interventricular septal pacing lead                As of 7/17/2025       Status: Implanted                        Lead, Capsurefix Novus, 58 Cm - [id number] - Implanted        Inventory item: LEAD, CAPSUREFIX NOVUS, 58 CM Model/Cat number:  5076-58    Serial number: [id number] : [employer]    Lot number: [id number] Device identifier: [id number]    Implant Date: 7/17/2025        GUDID Information       Request status Successful        Brand name: Berry® Lynne Version/Model: 5076-58    Company name: [employer] MRI safety info as of 7/17/25: Labeling   does not contain MRI Safety Information    Contains dry or latex rubber: No      GMDN P.T. name: Endocardial/interventricular septal pacing lead                As of 7/17/2025       Status: Implanted                        Pacemaker, Dual Chamber, [place] [address] Mri Xt Dr - [id number] - Implanted        Inventory item: PACEMAKER, DUAL CHAMBER, MIGUEL ANGEL MRI XT DR Model/Cat   number: [id number]    : [employer] Lot number: [id number]    Device identifier: [id number] Implant Date: 7/17/2025      GUDID Information       Request status Successful        Brand name: [place]™ XT DR MRI SureScan™ Version/Model: W1DR01    Company name: [employer] MRI safety info as of 7/17/25: MR   Conditional    Contains dry or latex rubber: No      GMDN P.T. name: Dual-chamber implantable pacemaker, rate-responsive                As of 7/17/2025       Status: Implanted                            Estimated Blood Loss:   * No values recorded between 7/17/2025  4:05 PM and 7/17/2025  5:24 PM *    Anesthesia: Moderate Sedation Anesthesia Staff: No anesthesia staff   entered.    Any Specimen(s) Removed:   No specimens collected during this procedure.    Physical Exam  Constitutional:       Appearance: Normal appearance.      Cardiovascular:      Rate and Rhythm: Normal rate.      Heart sounds: Normal heart sounds. No murmur heard.  Pulmonary:      Effort: Pulmonary effort is normal.      Breath sounds: Normal breath sounds. No wheezing or rales.   Abdominal:      General: Abdomen is flat. There is no distension.      Palpations: Abdomen is soft. There is no mass.      Tenderness: There is no abdominal  tenderness.      Musculoskeletal:      Right lower leg: No edema.      Left lower leg: No edema.      Neurological:      General: No focal deficit present.      Mental Status: She is alert. Mental status is at baseline. She is disoriented.     Relevant Results  No results found for this or any previous visit (from the past 24 hours).  No results found.      Assessment & Plan  Acute systolic heart failure    Acute respiratory failure with hypoxemia    Pleural effusion    Stella Agarwal is a 74 y.o. female. Medically clear for discharge to SNF, precert pending.      Acute medical issues  # Recurrent falls on anticoagulation  # Sick sinus syndrome s/p PPM  :: Likely secondary to syncope which was caused by sick sinus  - Ongoing physical therapy for deconditioning while inpatient  - Continue anticoagulation  - Follow up EP clinic Aug 6th     Resolved medical issues  # Bilateral pleural effusions s/p thoracentesis     # New onset atrial fibrillation with RVR (resolved)  - Metoprolol 25mg once daily  - Has improved with pacer + beta blockade      Chronic medical issues  # DVT - continue apixaban 5mg twice daily  # HTN - resume home losartan  # MDD - continue home fluoxetine  # Dementia - continue home olanzapine     # HFpEF  - losartan 25mg daily  - Metoprolol 25mg once daily         F PO  E Replete PRN  N Cardiac  G None  DVT: Therapeutic apixaban  Abx:  None  Drips: None  Code status: Full Code  NoK: Mini Simms [phone]     Dispo: Medically clear for dc. SNF pending precert    Bhakti Grimaldo DO, PGY-2  Internal Medicine   7/22/25 at 1:48 PM.

## 2025-07-22 NOTE — PROGRESS NOTES
Occupational Therapy    Occupational Therapy Treatment    Name: Stella Agarwal  MRN: [id number]  Department: [address]  Room: [address] [place] [address]  Date: 07/22/25  Time Calculation  Start Time: 0923  Stop Time: 0938  Time Calculation (min): 15 min    Assessment:  OT Assessment: Pt continues to present with decreased endurance/activity tolerance impacting ADL performance and functional mobility. Reccomend low intensity OT services to maximize pt safety and independence after discharge.  Prognosis: Good  Barriers to Discharge Home: No anticipated barriers  Evaluation/Treatment Tolerance: Patient tolerated treatment well  Medical Staff Made Aware: Yes  End of Session Communication: Bedside nurse  End of Session Patient Position: Up in chair, Alarm on  Plan:  Treatment Interventions: ADL retraining, Functional transfer training, Endurance training  OT Frequency: 2 times per week  OT Discharge Recommendations: Low intensity level of continued care  Equipment Recommended upon Discharge: Wheeled walker (owns)  OT Recommended Transfer Status: Assist of 1  OT - OK to Discharge: Yes (In accord with OT POC)    Subjective     OT Visit Info:  OT Received On: 07/22/25  General:  General  Reason for Referral: Impaired functional mobility; heart failure  Referred By: Mustapha Paz  Past Medical History Relevant to Rehab: DVT on life-long eliquis, vascular dementia, multiple falls, HTN, and GERD  Family/Caregiver Present: No  Prior to Session Communication: Bedside nurse (Pt appropriate for treatment with post-op pacemaker precautions, otherwise no new issues/concerns.)  Patient Position Received: Bed, 3 rail up, Alarm on  Preferred Learning Style: verbal  General Comment: Pt pleasant and cooperative with improved endurance and level of alertness.  Precautions:  Medical Precautions: Fall precautions (Cornel constant pulse ox, Telemetry, IV.)  Precautions Comment: Pacemaker placed 7/17/25    Pain Assessment:  Pain Assessment  Pain Assessment:  0-10  0-10 (Numeric) Pain Score: 0 - No pain    Objective   Cognition:  Overall Cognitive Status: Within Functional Limits  Arousal/Alertness: Appropriate responses to stimuli  Orientation Level: Oriented X4  Processing Speed:  (mild delays)  Activities of Daily Living: UE Dressing  UE Dressing Level of Assistance: Setup, Modified independent  UE Dressing Where Assessed: Chair  UE Dressing Comments: Pt donned zippered front opening sweat jacket following set-up.    LE Dressing  LE Dressing: Yes  LE Dressing Adaptive Equipment: Reacher, Sock aide, Dressing stick  Pants Level of Assistance: Setup, Contact guard  Sock Level of Assistance: Setup, Contact guard, Minimal verbal cues  Shoe Level of Assistance: Setup, Contact guard, Minimal verbal cues  LE Dressing Where Assessed: Edge of bed  LE Dressing Comments: Seated EOB using figure four position     Bed Mobility/Transfers: Bed Mobility  Bed Mobility: Yes  Bed Mobility 1  Bed Mobility 1: Supine to sitting  Level of Assistance 1: Close supervision  Bed Mobility Comments 1: HOB ~30 degrees    Transfers  Transfer: Yes  Transfer 1  Transfer From 1: Bed to  Transfer to 1: Stand  Technique 1: Sit to stand  Transfer Device 1: Walker  Transfer Level of Assistance 1: Modified independent  Trials/Comments 1: increased time on task  Transfers 2  Transfer From 2: Stand to  Transfer to 2: Chair with arms  Technique 2: Stand to sit  Transfer Device 2: Walker  Transfer Level of Assistance 2: Close supervision  Trials/Comments 2: Verbal cues for safe transition hands device to chair arms.    Outcome Measures:  [facility] Daily Activity  Putting on and taking off regular lower body clothing: A little  Bathing (including washing, rinsing, drying): A little  Putting on and taking off regular upper body clothing: A little  Toileting, which includes using toilet, bedpan or urinal: A little  Taking care of personal grooming such as brushing teeth: A little  Eating Meals: None  Daily Activity -  Total Score: 19    Education Documentation  Body Mechanics, taught by SAMIA Umaña at 7/22/2025 11:18 AM.  Learner: Patient  Readiness: Acceptance  Method: Explanation, Demonstration  Response: Verbalizes Understanding, Demonstrated Understanding, Needs Reinforcement  Comment: Pt educated on falls precautions and device safety, effective body mechanics, use of adaptive techniques and tools for self-care, and non-medication pain management and energy conservation in course of session. Continued reinforcement is indicated.    Precautions, taught by SAMIA Umaña at 7/22/2025 11:18 AM.  Learner: Patient  Readiness: Acceptance  Method: Explanation, Demonstration  Response: Verbalizes Understanding, Demonstrated Understanding, Needs Reinforcement  Comment: Pt educated on falls precautions and device safety, effective body mechanics, use of adaptive techniques and tools for self-care, and non-medication pain management and energy conservation in course of session. Continued reinforcement is indicated.    ADL Training, taught by SAMIA Umaña at 7/22/2025 11:18 AM.  Learner: Patient  Readiness: Acceptance  Method: Explanation, Demonstration  Response: Verbalizes Understanding, Demonstrated Understanding, Needs Reinforcement  Comment: Pt educated on falls precautions and device safety, effective body mechanics, use of adaptive techniques and tools for self-care, and non-medication pain management and energy conservation in course of session. Continued reinforcement is indicated.    Goals:  Encounter Problems       Encounter Problems (Active)       OT Goals       Pt will tolerate 10min stand during functional task completion with no more than 1 rest break in order to increase endurance for functional task completion.  (Progressing)       Start:  07/12/25    Expected End:  07/26/25            Pt will complete [id number] transfers using LRD in preparation for ADLs with SBA  (Progressing)       Start:   07/12/25    Expected End:  07/26/25            Pt will increase endurance to tolerate 15min of OOB activity with no more than 1 rest break in order to increase ability to engage in ADL completion.  (Progressing)       Start:  07/12/25    Expected End:  07/26/25            Pt will demo LE ADL completion with supervision, using AE if needed.  (Progressing)       Start:  07/12/25    Expected End:  07/26/25            Pt will complete ileana hygiene and clothing management during toileting ADL with SBA, using DME and adaptive strategies as neccesary  (Progressing)       Start:  07/12/25    Expected End:  07/26/25

## 2025-07-22 NOTE — CARE PLAN
The patient's goals for the shift include      The clinical goals for the shift include no falls      Problem: Safety - Adult  Goal: Free from fall injury  Outcome: Progressing

## 2025-07-22 NOTE — CARE PLAN
Problem: Heart Failure  Goal: Improved gas exchange this shift  Outcome: Progressing  Goal: Improved urinary output this shift  Outcome: Progressing  Goal: Reduction in peripheral edema within 24 hours  Outcome: Progressing  Goal: Report improvement of dyspnea/breathlessness this shift  Outcome: Progressing  Goal: Weight from fluid excess reduced over 2-3 days, then stabilize  Outcome: Progressing  Goal: Increase self care and/or family involvement in 24 hours  Outcome: Progressing     Problem: Pain - Adult  Goal: Verbalizes/displays adequate comfort level or baseline comfort level  Outcome: Progressing     Problem: Safety - Adult  Goal: Free from fall injury  Outcome: Progressing     Problem: Discharge Planning  Goal: Discharge to home or other facility with appropriate resources  Outcome: Progressing     Problem: Chronic Conditions and Co-morbidities  Goal: Patient's chronic conditions and co-morbidity symptoms are monitored and maintained or improved  Outcome: Progressing     Problem: Nutrition  Goal: Nutrient intake appropriate for maintaining nutritional needs  Outcome: Progressing     Problem: Skin  Goal: Decreased wound size/increased tissue granulation at next dressing change  Outcome: Progressing  Flowsheets (Taken 7/22/2025 1058)  Decreased wound size/increased tissue granulation at next dressing change: Promote sleep for wound healing  Goal: Participates in plan/prevention/treatment measures  Outcome: Progressing  Flowsheets (Taken 7/22/2025 1051)  Participates in plan/prevention/treatment measures:   Discuss with provider PT/OT consult   Elevate heels  Goal: Prevent/manage excess moisture  Outcome: Progressing  Flowsheets (Taken 7/22/2025 1051)  Prevent/manage excess moisture: Cleanse incontinence/protect with barrier cream  Goal: Prevent/minimize sheer/friction injuries  Outcome: Progressing  Flowsheets (Taken 7/22/2025 1051)  Prevent/minimize sheer/friction injuries: Complete micro-shifts as needed  if patient unable. Adjust patient position to relieve pressure points, not a full turn  Goal: Promote/optimize nutrition  Outcome: Progressing  Flowsheets (Taken 7/22/2025 1058)  Promote/optimize nutrition: Monitor/record intake including meals  Goal: Promote skin healing  Outcome: Progressing  Flowsheets (Taken 7/22/2025 1058)  Promote skin healing: Assess skin/pad under line(s)/device(s)   The patient's goals for the shift include  to get some rest     The clinical goals for the shift include pt will remain pain free and free from falls throughout the shift.     Over the shift, the patient did not make progress toward the following goals. Barriers to progression include none. Recommendations to address these barriers include none.

## 2025-07-22 NOTE — CONSULTS
Wound Care Consult     Visit Date: 7/22/2025      Patient Name: Stella Agarwal         MRN: [id number]             Reason for Consult:   Received consult, EMR reviewed.  Status d/w primary RN - patient has no wound care needs at this time.      Wound Plan:    No wound care needs, please message should that change.      Sandra Latif RN, [facility], [facility]  7/22/2025  3:57 PM

## 2025-07-22 NOTE — PROGRESS NOTES
Physical Therapy    Physical Therapy Treatment    Patient Name: Stella Agarwal  MRN: [id number]  Department: [address]  Room: [address] [place], [place] [address]  Today's Date: 7/22/2025  Time Calculation  Start Time: 1010  Stop Time: 1033  Time Calculation (min): 23 min         Assessment/Plan   PT Assessment  PT Assessment Results: Decreased strength, Decreased endurance, Impaired balance, Decreased mobility  Rehab Prognosis: Good  Barriers to Discharge Home: Caregiver assistance, Physical needs  Caregiver Assistance: Caregiver assistance needed per identified barriers - however, level of patient's required assistance exceeds assistance available at home  Physical Needs: Intermittent mobility assistance needed, Intermittent ADL assistance needed, High falls risk due to function or environment  End of Session Communication: Bedside nurse  Assessment Comment: progressing towards goas, continue to recommend Mod intensity skilled PT at DC for transers, gait, endurance and dynamic balance training to decreae falls risk and increae independence to PLOF.  End of Session Patient Position: Up in chair, Alarm on  PT Plan  Inpatient/Swing Bed or Outpatient: Inpatient  PT Plan  Treatment/Interventions: Bed mobility, Transfer training, Gait training, Endurance training  PT Plan: Ongoing PT  PT Frequency: 3 times per week (During inpatient hospitilization)  PT Discharge Recommendations: Moderate intensity level of continued care (Based on current functional status and rehab potential, patient is anticipated to tolerate and benefit from 5 or more days per week of skilled rehabilitative therapy after discharge from this acute inpatient hospitalization.)  Equipment Recommended upon Discharge: Wheeled walker (owns)  PT Recommended Transfer Status: Assistive device, Assist x1  PT - OK to Discharge: Yes    PT Visit Info:  PT Received On: 07/22/25  Response to Previous Treatment: Patient with no complaints from previous session.     General Visit Information:    General  Reason for Referral: Impaired functional mobility; heart failure  Referred By: Mustapha Paz  Past Medical History Relevant to Rehab: DVT on life-long eliquis, vascular dementia, multiple falls, HTN, and GERD  Family/Caregiver Present: No  Prior to Session Communication: Bedside nurse  Patient Position Received: Alarm on, Up in chair  Preferred Learning Style: verbal  General Comment: AXOX3, on RA , no c/o SOB.    Subjective   Precautions:  Precautions  Medical Precautions: Fall precautions  Precautions Comment: Pacemaker placed 7/17/25            Objective   Pain:  Pain Assessment  Pain Assessment: 0-10  0-10 (Numeric) Pain Score: 0 - No pain  Cognition:  Cognition  Overall Cognitive Status: Within Functional Limits  Orientation Level: Oriented X4  Coordination:     Postural Control:  Static Standing Balance  Static Standing-Balance Support: Bilateral upper extremity supported  Static Standing-Level of Assistance: Contact guard  Static Standing-Comment/Number of Minutes: 45 seconds x2, wt shifting and posture control  Extremity/Trunk Assessments:    Activity Tolerance:  Activity Tolerance  Endurance: Tolerates 10 - 20 min exercise with multiple rests  Treatments:  Therapeutic Exercise  Therapeutic Exercise Performed: Yes  Therapeutic Exercise Activity 1: seated: ankle pumps B 2 x 15, LAQ B 2 x 15, Marching B 2 x 15         Therapeutic Activity  Therapeutic Activity Performed: Yes (education for endurance building and energy conservation)                        Ambulation/Gait Training  Ambulation/Gait Training Performed: Yes  Ambulation/Gait Training 1  Surface 1: Level tile  Device 1: Rolling walker  Assistance 1: Contact guard, Minimal verbal cues, Minimal tactile cues  Quality of Gait 1:  (Forward flexed posture, decreased step length, decreased marcelo, generalized dysarthria and unsteadiness)  Comments/Distance (ft) 1: 25ft x1  Transfers  Transfer: Yes  Transfer 1  Transfer From 1: Sit to, Bed  to  Transfer to 1: Stand  Technique 1: Sit to stand  Transfer Device 1: Walker  Transfer Level of Assistance 1: Moderate assistance, Minimum assistance, Moderate verbal cues, Minimal tactile cues  Trials/Comments 1: x2 (avoid low seating, VC for hand placement and safety and technique)                          Outcome Measures:  [facility] Basic Mobility  Turning from your back to your side while in a flat bed without using bedrails: A little  Moving from lying on your back to sitting on the side of a flat bed without using bedrails: A little  Moving to and from bed to chair (including a wheelchair): A little  Standing up from a chair using your arms (e.g. wheelchair or bedside chair): A lot  To walk in hospital room: A little  Climbing 3-5 steps with railing: A lot  Basic Mobility - Total Score: 16    Education Documentation  Precautions, taught by Lashanda Bradshaw PTA at 7/22/2025 11:45 AM.  Learner: Patient  Readiness: Acceptance  Method: Explanation  Response: Needs Reinforcement    Mobility Training, taught by Lashanda Bradshaw PTA at 7/22/2025 11:45 AM.  Learner: Patient  Readiness: Acceptance  Method: Explanation  Response: Needs Reinforcement    Body Mechanics, taught by Lashanda Bradshaw PTA at 7/22/2025 11:45 AM.  Learner: Patient  Readiness: Acceptance  Method: Explanation  Response: Needs Reinforcement    Precautions, taught by Lashanda Bradshaw PTA at 7/22/2025 11:45 AM.  Learner: Patient  Readiness: Acceptance  Method: Explanation  Response: Needs Reinforcement    ADL Training, taught by Lashanda Bradshaw PTA at 7/22/2025 11:45 AM.  Learner: Patient  Readiness: Acceptance  Method: Explanation  Response: Needs Reinforcement    Education Comments  No comments found.        OP EDUCATION:       Encounter Problems       Encounter Problems (Active)       Balance       STG - Maintains static standing balance with upper extremity support x 2' supervision  (Progressing)       Start:  07/13/25    Expected End:  07/27/25                Mobility       STG - Patient will ambulate with 2WW 25' CGA  (Progressing)       Start:  07/13/25    Expected End:  07/27/25               PT Transfers       STG - Patient will perform bed mobility independently  (Progressing)       Start:  07/13/25    Expected End:  07/27/25            STG - Patient will transfer sit to and from stand supervision  (Progressing)       Start:  07/13/25    Expected End:  07/27/25

## 2025-07-23 VITALS
WEIGHT: 180.5 LBS | HEART RATE: 65 BPM | DIASTOLIC BLOOD PRESSURE: 70 MMHG | HEIGHT: 60 IN | BODY MASS INDEX: 35.44 KG/M2 | RESPIRATION RATE: 19 BRPM | TEMPERATURE: 97.7 F | OXYGEN SATURATION: 98 % | SYSTOLIC BLOOD PRESSURE: 114 MMHG

## 2025-07-23 PROBLEM — J90 PLEURAL EFFUSION: Status: RESOLVED | Noted: 2025-07-14 | Resolved: 2025-07-23

## 2025-07-23 PROBLEM — I50.21 ACUTE SYSTOLIC HEART FAILURE: Status: RESOLVED | Noted: 2025-07-11 | Resolved: 2025-07-23

## 2025-07-23 PROBLEM — J96.01 ACUTE RESPIRATORY FAILURE WITH HYPOXEMIA: Status: RESOLVED | Noted: 2025-07-14 | Resolved: 2025-07-23

## 2025-07-23 LAB
ACID FAST STN SPEC: NORMAL
MYCOBACTERIUM SPEC CULT: NORMAL

## 2025-07-23 PROCEDURE — 2500000004 HC RX 250 GENERAL PHARMACY W/ HCPCS (ALT 636 FOR OP/ED)

## 2025-07-23 PROCEDURE — 99239 HOSP IP/OBS DSCHRG MGMT >30: CPT

## 2025-07-23 PROCEDURE — 2500000005 HC RX 250 GENERAL PHARMACY W/O HCPCS: Performed by: STUDENT IN AN ORGANIZED HEALTH CARE EDUCATION/TRAINING PROGRAM

## 2025-07-23 PROCEDURE — 2500000004 HC RX 250 GENERAL PHARMACY W/ HCPCS (ALT 636 FOR OP/ED): Performed by: BEHAVIOR TECHNICIAN

## 2025-07-23 PROCEDURE — 2500000001 HC RX 250 WO HCPCS SELF ADMINISTERED DRUGS (ALT 637 FOR MEDICARE OP)

## 2025-07-23 PROCEDURE — 2500000002 HC RX 250 W HCPCS SELF ADMINISTERED DRUGS (ALT 637 FOR MEDICARE OP, ALT 636 FOR OP/ED)

## 2025-07-23 RX ADMIN — ASPIRIN 81 MG: 81 TABLET, DELAYED RELEASE ORAL at 09:49

## 2025-07-23 RX ADMIN — LOSARTAN POTASSIUM 25 MG: 50 TABLET, FILM COATED ORAL at 09:49

## 2025-07-23 RX ADMIN — POLYETHYLENE GLYCOL 3350 17 G: 17 POWDER, FOR SOLUTION ORAL at 09:48

## 2025-07-23 RX ADMIN — FLUOXETINE HYDROCHLORIDE 30 MG: 10 CAPSULE ORAL at 09:49

## 2025-07-23 RX ADMIN — Medication 1 TABLET: at 09:49

## 2025-07-23 RX ADMIN — Medication: at 07:57

## 2025-07-23 RX ADMIN — POTASSIUM CHLORIDE 20 MEQ: 20 TABLET, EXTENDED RELEASE ORAL at 09:49

## 2025-07-23 RX ADMIN — METOPROLOL SUCCINATE 25 MG: 25 TABLET, EXTENDED RELEASE ORAL at 09:49

## 2025-07-23 RX ADMIN — PANTOPRAZOLE SODIUM 40 MG: 40 TABLET, DELAYED RELEASE ORAL at 06:21

## 2025-07-23 RX ADMIN — APIXABAN 5 MG: 5 TABLET, FILM COATED ORAL at 09:49

## 2025-07-23 RX ADMIN — FUROSEMIDE 20 MG: 20 TABLET ORAL at 09:49

## 2025-07-23 RX ADMIN — NYSTATIN 1 APPLICATION: 100000 POWDER TOPICAL at 09:55

## 2025-07-23 ASSESSMENT — ACTIVITIES OF DAILY LIVING (ADL): LACK_OF_TRANSPORTATION: NO

## 2025-07-23 ASSESSMENT — COGNITIVE AND FUNCTIONAL STATUS - GENERAL
WALKING IN HOSPITAL ROOM: A LOT
MOVING FROM LYING ON BACK TO SITTING ON SIDE OF FLAT BED WITH BEDRAILS: A LITTLE
STANDING UP FROM CHAIR USING ARMS: A LOT
DRESSING REGULAR UPPER BODY CLOTHING: A LITTLE
TOILETING: A LITTLE
HELP NEEDED FOR BATHING: A LITTLE
PERSONAL GROOMING: A LITTLE
MOBILITY SCORE: 14
MOVING TO AND FROM BED TO CHAIR: A LOT
DRESSING REGULAR LOWER BODY CLOTHING: A LOT
CLIMB 3 TO 5 STEPS WITH RAILING: A LOT
DAILY ACTIVITIY SCORE: 18
TURNING FROM BACK TO SIDE WHILE IN FLAT BAD: A LITTLE

## 2025-07-23 ASSESSMENT — PAIN - FUNCTIONAL ASSESSMENT: PAIN_FUNCTIONAL_ASSESSMENT: 0-10

## 2025-07-23 ASSESSMENT — PAIN SCALES - GENERAL: PAINLEVEL_OUTOF10: 0 - NO PAIN

## 2025-07-23 NOTE — CARE PLAN
Problem: Heart Failure  Goal: Improved gas exchange this shift  Outcome: Progressing  Goal: Improved urinary output this shift  Outcome: Progressing  Goal: Reduction in peripheral edema within 24 hours  Outcome: Progressing  Goal: Report improvement of dyspnea/breathlessness this shift  Outcome: Progressing  Goal: Weight from fluid excess reduced over 2-3 days, then stabilize  Outcome: Progressing  Goal: Increase self care and/or family involvement in 24 hours  Outcome: Progressing     Problem: Pain - Adult  Goal: Verbalizes/displays adequate comfort level or baseline comfort level  Outcome: Progressing     Problem: Safety - Adult  Goal: Free from fall injury  Outcome: Progressing     Problem: Discharge Planning  Goal: Discharge to home or other facility with appropriate resources  Outcome: Progressing     Problem: Chronic Conditions and Co-morbidities  Goal: Patient's chronic conditions and co-morbidity symptoms are monitored and maintained or improved  Outcome: Progressing     Problem: Nutrition  Goal: Nutrient intake appropriate for maintaining nutritional needs  Outcome: Progressing     Problem: Skin  Goal: Decreased wound size/increased tissue granulation at next dressing change  Outcome: Progressing  Flowsheets (Taken 7/23/2025 1246)  Decreased wound size/increased tissue granulation at next dressing change: Promote sleep for wound healing  Goal: Participates in plan/prevention/treatment measures  Outcome: Progressing  Flowsheets (Taken 7/23/2025 1246)  Participates in plan/prevention/treatment measures: Elevate heels  Goal: Prevent/manage excess moisture  Outcome: Progressing  Flowsheets (Taken 7/23/2025 1246)  Prevent/manage excess moisture: Cleanse incontinence/protect with barrier cream  Goal: Prevent/minimize sheer/friction injuries  Outcome: Progressing  Flowsheets (Taken 7/23/2025 1246)  Prevent/minimize sheer/friction injuries: Complete micro-shifts as needed if patient unable. Adjust patient  position to relieve pressure points, not a full turn  Goal: Promote/optimize nutrition  Outcome: Progressing  Flowsheets (Taken 7/23/2025 1246)  Promote/optimize nutrition: Monitor/record intake including meals  Goal: Promote skin healing  Outcome: Progressing  Flowsheets (Taken 7/23/2025 1246)  Promote skin healing: Assess skin/pad under line(s)/device(s)   The patient's goals for the shift include  to be discharged     The clinical goals for the shift include pt to be discharged to rehab    Over the shift, the patient did not make progress toward the following goals. Barriers to progression include none. Recommendations to address these barriers include none.

## 2025-07-23 NOTE — PROGRESS NOTES
07/23/25 1052   Discharge Planning   Living Arrangements Other (Comment)  (Patient is a resident at [facility])   Assistance Needed Per [facility] at baseline patient is A&OX2, Needs assistance with ADL's(prompt and standby),  Feeds self and ambulates independently with rollator. No active HHC agency. PCP Mustapha Billings   Type of Residence Assisted living   Do you have animals or pets at home? No   Care Facility Name [facility]   Who is requesting discharge planning? Provider   Home or Post Acute Services Post acute facilities (Rehab/SNF/etc)   Type of Post Acute Facility Services Skilled nursing   Expected Discharge Disposition SNF  (Plan is for patient to discharge to [facility] today 7/23/25, patient's sister Mini has been notified of discharge.)   Does the patient need discharge transport arranged? Yes   [facility] coordination needed? Yes   Has discharge transport been arranged? No   Financial Resource Strain   How hard is it for you to pay for the very basics like food, housing, medical care, and heating? Not very   Housing Stability   In the last 12 months, was there a time when you were not able to pay the mortgage or rent on time? N   In the past 12 months, how many times have you moved where you were living? 0   At any time in the past 12 months, were you homeless or living in a shelter (including now)? N   Transportation Needs   In the past 12 months, has lack of transportation kept you from medical appointments or from getting medications? no   In the past 12 months, has lack of transportation kept you from meetings, work, or from getting things needed for daily living? No   Patient Choice   Provider Choice list and CMS website (https://medicare.gov/care-compare#search) for post-acute Quality and Resource Measure Data were provided and reviewed with: Patient;Family   Patient / Family choosing to utilize agency / facility established prior to  hospitalization No   Stroke Family Assessment   Stroke Family Assessment Needed No   Intensity of Service   Intensity of Service 0-30 min

## 2025-07-23 NOTE — CONSULTS
Heart Failure Nurse Navigator    History  Stella Agarwal is a 74 y.o. female who presented to [facility] on 7/11/2025 for syncope.    Patient has a PMH of vascular dementia, is alert to self and place but has difficulty answering questions and does not wish to answer questions. Patient is not appropriate for HF education.     Theodore Rivas RN

## 2025-07-23 NOTE — DISCHARGE INSTRUCTIONS
Please, take your home medications as instructed after being discharged from the hospital.     NEW MEDICATIONS:    -Eliquis 5mg BID  -Losartan 25g every day  -Metoprolol XL 25mg every day    UPCOMING APPOINTMENTS:     Please, follow-up with your PCP, EP & Cardiology after leaving the hospital. /appointment services has been requested to make an appointment for you, however if you do not hear back from them within 1 to 2 days, please call your primary physician's office to schedule an appointment. Bring your photo ID and insurance card to your appointment.   [facility]  services can be reached at [phone].     If you experience any worsening symptoms or have any concerns, please contact your primary care provider to schedule an appointment. If you cannot get in touch with your primary care physician, please return to the nearest emergency room or urgent care clinic for an evaluation and treatment.     Thank you for choosing [facility] and allowing us to partake in your medical care!     - [facility] inpatient primary care team.

## 2025-07-23 NOTE — DISCHARGE SUMMARY
Discharge Diagnosis  Acute diastolic CHF  Bilateral Pleural Effusion  Paroxysmal Afib with RVR  Hypotension  Sick Sinus Syndrome s/p PPM placement  Mechanical Fall w/ Head Injury on anticoagulation    Issues Requiring Follow-Up  Followup with Electrophysiology, Cardiology, PCP after discharge from hospital.    Discharge Meds     Medication List      CHANGE how you take these medications     apixaban 5 mg tablet; Commonly known as: Eliquis; Take 1 tablet (5 mg)   by mouth every 12 hours.; What changed: medication strength, how much to   take, when to take this   * FLUoxetine 10 mg capsule; Commonly known as: PROzac; What changed:   Another medication with the same name was changed. Make sure you   understand how and when to take each.   * FLUoxetine 20 mg capsule; Commonly known as: PROzac; (1) CAPSULE BY   MOUTH EVERY MORNING FOR ANXIETY / DEPRESSION; What changed: See the new   instructions.   losartan 25 mg tablet; Commonly known as: Cozaar; Take 1 tablet (25 mg)   by mouth once daily.; What changed: medication strength, See the new   instructions.   metoprolol succinate XL 25 mg 24 hr tablet; Commonly known as:   Toprol-XL; Take 1 tablet (25 mg) by mouth once daily. Do not crush or   chew.; What changed: medication strength, See the new instructions.  * This list has 2 medication(s) that are the same as other medications   prescribed for you. Read the directions carefully, and ask your doctor or   other care provider to review them with you.     CONTINUE taking these medications     acetaminophen 325 mg tablet; Commonly known as: Tylenol   alum-mag hydroxide-simeth 200-200-20 mg/5 mL oral suspension; Commonly   known as: Mylanta   aspirin 81 mg EC tablet; Take 1 tablet (81 mg) by mouth once daily.   diphenhydrAMINE 25 mg capsule; Commonly known as: BENADryl   donepezil 5 mg tablet; Commonly known as: Aricept   furosemide 20 mg tablet; Commonly known as: Lasix; (1) TABLET BY MOUTH   ONCE DAILY   hydrocortisone 1 %  cream   loperamide 2 mg capsule; Commonly known as: Imodium   loratadine 10 mg tablet; Commonly known as: Claritin   magnesium hydroxide 400 mg/5 mL suspension; Commonly known as: Milk of   Magnesia   OLANZapine 10 mg tablet; Commonly known as: ZyPREXA; (1) TABLET BY MOUTH   AT BEDTIME PSYCHOSIS   omeprazole 40 mg DR capsule; Commonly known as: PriLOSEC; Take 1 capsule   (40 mg) by mouth once daily.   OneLAX bisacodyL 10 mg suppository; Generic drug: bisacodyl   sennosides 8.6 mg tablet; Commonly known as: Senokot       Test Results Pending At Discharge  Pending Labs       Order Current Status    AFB Culture/Smear Preliminary result    Fungal Culture/Smear Preliminary result            Hospital Course  Brief HPI: Stella Agarwal is a 74 y.o. female with a PMH of DVT on life-long eliquis, vascular dementia, multiple falls, HTN, and GERD who presented to [facility] on 7/11/2025 for syncope. Of note there were no witnesses of event and patient is unreliable historian. Patient states she was walking in her room at nursing home with walker when she slipped. Her walker hit her in the face and she fell backwards, hitting the back of her head on the floor. She then lost consciousness for what she estimates to be a few minutes. When she awoke, she states she had no confusion. She endorses pain in her left leg which she hit during her fall, but denies any pain in her head, neck, or anywhere else. She denies any chest pain, abdominal pain, confusion, weakness, numbness, constipation, or diarrhea. She also denies any dizziness or precipitating symptoms before her fall.     ED Course: Vitals: T 98.2, /97, HR 46, RR 18, SpO2 84% on RA (resolved with 2 L NC)     Labs: CMP and CBC unremarkable, BNP 1206, trop 16, UA unremarkable     Imaging:  EKG showed sinus bradycardia with single PVC, LAD, low voltage QRS     CTCAP showed large right and small left pleural effusions, near complete collapse of the right lower lobe,  cardiomegaly, and trace ascites     CT head and cervical/thoracic/lumbar spine unremarkable  Interventions: 2 L NC, lasix 40 IV    Floor:  Metoprolol was held and patient was diuresed. Pulmonology was consulted and thoracentesis performed on 7/12. Cardio was consulted given concern of syncope with bradycardia, likely sick sinus syndrome. While diuresing patient, she developed a-fib with RVR requiring rate control with metoprolol. Cardiology elected to place permanent pacemaker given need for rate control and presence of sick sinus syndrome. She tolerated the procedure well and was diuresed until euvolemic. Remained hemodynamically stable for remainder of stay. Follow up with EP for her PPM and a-fib. Follow up with  cardiologist for HFpEF.   No complaints, doing well. HDS for discharge to [facility] ([facility]).    Pertinent Physical Exam At Time of Discharge  Physical Exam  Constitutional:       Appearance: Normal appearance.   Cardiovascular:      Rate and Rhythm: Normal rate.      Heart sounds: Normal heart sounds. No murmur heard.  Pulmonary:      Effort: Pulmonary effort is normal.      Breath sounds: Normal breath sounds. No wheezing or rales.   Abdominal:      General: Abdomen is flat. There is no distension.      Palpations: Abdomen is soft. There is no mass.      Tenderness: There is no abdominal tenderness.   Musculoskeletal:      Right lower leg: No edema.      Left lower leg: No edema.   Neurological:      General: No focal deficit present.      Mental Status: She is alert. Mental status is at baseline. She is disoriented.    Outpatient Follow-Up  Future Appointments   Date Time Provider Department Center   8/7/2025  3:20 PM Beto Burgess MD [id number] DO [place]

## 2025-07-24 ENCOUNTER — NURSING HOME VISIT (OUTPATIENT)
Dept: POST ACUTE CARE | Facility: EXTERNAL LOCATION | Age: 75
End: 2025-07-24

## 2025-07-24 ENCOUNTER — HOSPITAL ENCOUNTER (OUTPATIENT)
Dept: CARDIOLOGY | Facility: CLINIC | Age: 75
Discharge: HOME | End: 2025-07-24
Payer: COMMERCIAL

## 2025-07-24 DIAGNOSIS — I50.9 HEART FAILURE, UNSPECIFIED HF CHRONICITY, UNSPECIFIED HEART FAILURE TYPE: Primary | ICD-10-CM

## 2025-07-24 DIAGNOSIS — Z95.0 PRESENCE OF CARDIAC PACEMAKER: ICD-10-CM

## 2025-07-24 DIAGNOSIS — I10 HYPERTENSION, UNSPECIFIED TYPE: ICD-10-CM

## 2025-07-24 DIAGNOSIS — R55 SYNCOPE, UNSPECIFIED SYNCOPE TYPE: ICD-10-CM

## 2025-07-24 DIAGNOSIS — Z91.81 AT RISK FOR FALLING: ICD-10-CM

## 2025-07-24 DIAGNOSIS — R53.1 WEAKNESS: ICD-10-CM

## 2025-07-24 DIAGNOSIS — F32.A DEPRESSION, UNSPECIFIED DEPRESSION TYPE: ICD-10-CM

## 2025-07-24 DIAGNOSIS — I49.5 SICK SINUS SYNDROME (MULTI): ICD-10-CM

## 2025-07-24 DIAGNOSIS — J90 PLEURAL EFFUSION: ICD-10-CM

## 2025-07-24 DIAGNOSIS — F03.90 DEMENTIA, UNSPECIFIED DEMENTIA SEVERITY, UNSPECIFIED DEMENTIA TYPE, UNSPECIFIED WHETHER BEHAVIORAL, PSYCHOTIC, OR MOOD DISTURBANCE OR ANXIETY (MULTI): ICD-10-CM

## 2025-07-24 DIAGNOSIS — I82.90 DEEP VEIN THROMBOSIS (DVT) OF NON-EXTREMITY VEIN, UNSPECIFIED CHRONICITY: ICD-10-CM

## 2025-07-24 PROCEDURE — 99305 1ST NF CARE MODERATE MDM 35: CPT | Performed by: INTERNAL MEDICINE

## 2025-07-24 NOTE — LETTER
Patient: Stella Agarwal  : 1950    Encounter Date: 2025    PLACE OF SERVICE:  [facility]    This is new/initial history and physical.    Subjective  Patient ID: Stella Agarwal is a 74 y.o. female who presents for Annual Exam and new/initial history.    Ms. Stella Agarwal is a 74-year-old female with history of dementia who has developed CHF with pleural effusions.  She is unable to care for herself and requires supportive care.    Review of Systems   Constitutional:  Negative for chills and fever.   Cardiovascular:  Negative for chest pain.   All other systems reviewed and are negative.    Objective  /80   Pulse 84   Temp 36.6 °C (97.9 °F)   Resp 18     Physical Exam  Vitals reviewed.   Constitutional:       General: She is not in acute distress.     Comments: This is a well-developed, well-nourished female, sitting in a chair.   HENT:      Right Ear: Tympanic membrane, ear canal and external ear normal.      Left Ear: Tympanic membrane, ear canal and external ear normal.     Eyes:      General: No scleral icterus.     Pupils: Pupils are equal, round, and reactive to light.     Neck:      Vascular: No carotid bruit.     Cardiovascular:      Heart sounds: Normal heart sounds, S1 normal and S2 normal. No murmur heard.     No friction rub.   Pulmonary:      Effort: Pulmonary effort is normal.      Breath sounds: Normal breath sounds and air entry.   Abdominal:      Palpations: There is no hepatomegaly, splenomegaly or mass.     Musculoskeletal:         General: No swelling or deformity. Normal range of motion.      Cervical back: Neck supple.      Right lower leg: Edema present.      Left lower leg: Edema present.   Lymphadenopathy:      Cervical: No cervical adenopathy.      Upper Body:      Right upper body: No axillary adenopathy.      Left upper body: No axillary adenopathy.      Lower Body: No right inguinal adenopathy. No left inguinal adenopathy.      Neurological:      Mental Status: She is alert.      Cranial Nerves: Cranial nerves 2-12 are intact. No cranial nerve deficit.      Sensory: No sensory deficit.      Motor: Motor function is intact. No weakness.      Gait: Gait is intact.      Deep Tendon Reflexes: Reflexes normal.      Comments: The patient is oriented x2.   Psychiatric:         Mood and Affect: Mood normal. Mood is not anxious or depressed. Affect is not angry.         Behavior: Behavior is not agitated.         Thought Content: Thought content normal.         Judgment: Judgment normal.     LAB WORK: Laboratory studies reviewed.    Assessment/Plan  Problem List Items Addressed This Visit    None  Visit Diagnoses         Codes      Heart failure, unspecified HF chronicity, unspecified heart failure type    -  Primary I50.9      Pleural effusion     J90      Syncope, unspecified syncope type     R55      Dementia, unspecified dementia severity, unspecified dementia type, unspecified whether behavioral, psychotic, or mood disturbance or anxiety (Multi)     F03.90      Hypertension, unspecified type     I10      Deep vein thrombosis (DVT) of non-extremity vein, unspecified chronicity     I82.90      Depression, unspecified depression type     F32.A      Weakness     R53.1      At risk for falling     Z91.81        1. Heart failure, on diuretic.  2. Pleural effusions, follow chest x-ray.  3. Recent syncope, continue to monitor.  4. Dementia, unchanged.  5. Hypertension, med controlled.  6. DVT, on Eliquis.  7. Depression, on fluoxetine.  8. Weakness, on PT/OT.  9. Fall risk, fall precautions.    Scribe Attestation  By signing my name below, INancy Scribe attest that this documentation has been prepared under the direction and in the presence of Tomas Talavera MD.     All medical record entries made by the scribe were personally dictated by me I have reviewed the chart and agree the record accurately reflects my personal performance of his  history physical examination and management      Electronically Signed By: Tomas Talavera MD   8/2/25 12:33 AM

## 2025-07-27 ENCOUNTER — NURSING HOME VISIT (OUTPATIENT)
Dept: POST ACUTE CARE | Facility: EXTERNAL LOCATION | Age: 75
End: 2025-07-27
Payer: COMMERCIAL

## 2025-07-27 DIAGNOSIS — F32.A DEPRESSION, UNSPECIFIED DEPRESSION TYPE: ICD-10-CM

## 2025-07-27 DIAGNOSIS — I50.9 HEART FAILURE, UNSPECIFIED HF CHRONICITY, UNSPECIFIED HEART FAILURE TYPE: ICD-10-CM

## 2025-07-27 DIAGNOSIS — S02.85XA CLOSED FRACTURE OF ORBITAL WALL, INITIAL ENCOUNTER (MULTI): ICD-10-CM

## 2025-07-27 DIAGNOSIS — J90 PLEURAL EFFUSION: Primary | ICD-10-CM

## 2025-07-27 DIAGNOSIS — F03.90 DEMENTIA, UNSPECIFIED DEMENTIA SEVERITY, UNSPECIFIED DEMENTIA TYPE, UNSPECIFIED WHETHER BEHAVIORAL, PSYCHOTIC, OR MOOD DISTURBANCE OR ANXIETY (MULTI): ICD-10-CM

## 2025-07-27 DIAGNOSIS — E66.01 OBESITY, MORBID (MULTI): ICD-10-CM

## 2025-07-27 DIAGNOSIS — Z91.81 AT RISK FOR FALLING: ICD-10-CM

## 2025-07-27 DIAGNOSIS — I10 HYPERTENSION, UNSPECIFIED TYPE: ICD-10-CM

## 2025-07-27 DIAGNOSIS — R53.1 WEAKNESS: ICD-10-CM

## 2025-07-27 DIAGNOSIS — S02.831A: ICD-10-CM

## 2025-07-27 DIAGNOSIS — I82.90 DEEP VEIN THROMBOSIS (DVT) OF NON-EXTREMITY VEIN, UNSPECIFIED CHRONICITY: ICD-10-CM

## 2025-07-27 DIAGNOSIS — R55 SYNCOPE, UNSPECIFIED SYNCOPE TYPE: ICD-10-CM

## 2025-07-27 PROCEDURE — 99309 SBSQ NF CARE MODERATE MDM 30: CPT | Performed by: INTERNAL MEDICINE

## 2025-07-27 NOTE — LETTER
Patient: Stella Agarwal  : 1950    Encounter Date: 2025    PLACE OF SERVICE:  [facility]    This is a subsequent visit.    Subjective  Patient ID: Stella Agarwal is a 74 y.o. female who presents for Follow-up.    Ms. Stella Agarwal is a 74-year-old female with history of heart failure with pleural effusions.  She is unable to care for herself and requires supportive care.    Review of Systems   Constitutional:  Negative for chills and fever.   Cardiovascular:  Negative for chest pain.   All other systems reviewed and are negative.    Objective  /82   Pulse 80   Temp 36.6 °C (97.8 °F)   Resp 18     Physical Exam  Vitals reviewed.   Constitutional:       General: She is not in acute distress.     Comments: This is a well-developed, well-nourished female, sitting in a chair.   HENT:      Right Ear: Tympanic membrane, ear canal and external ear normal.      Left Ear: Tympanic membrane, ear canal and external ear normal.     Eyes:      General: No scleral icterus.     Pupils: Pupils are equal, round, and reactive to light.     Neck:      Vascular: No carotid bruit.     Cardiovascular:      Heart sounds: Normal heart sounds, S1 normal and S2 normal. No murmur heard.     No friction rub.   Pulmonary:      Breath sounds: Decreased breath sounds (throughout) present.   Abdominal:      Palpations: There is no hepatomegaly, splenomegaly or mass.     Musculoskeletal:         General: No swelling or deformity. Normal range of motion.      Cervical back: Neck supple.      Right lower leg: Edema present.      Left lower leg: Edema present.   Lymphadenopathy:      Cervical: No cervical adenopathy.      Upper Body:      Right upper body: No axillary adenopathy.      Left upper body: No axillary adenopathy.      Lower Body: No right inguinal adenopathy. No left inguinal adenopathy.     Neurological:      Mental Status: She is alert.      Cranial Nerves: Cranial nerves 2-12 are intact.  No cranial nerve deficit.      Sensory: No sensory deficit.      Motor: Motor function is intact. No weakness.      Gait: Gait is intact.      Deep Tendon Reflexes: Reflexes normal.      Comments: The patient is oriented x2.   Psychiatric:         Mood and Affect: Mood normal. Mood is not anxious or depressed. Affect is not angry.         Behavior: Behavior is not agitated.         Thought Content: Thought content normal.         Judgment: Judgment normal.     LAB WORK: Laboratory studies reviewed.    Assessment/Plan  Problem List Items Addressed This Visit    None  Visit Diagnoses         Codes      Pleural effusion    -  Primary J90      Heart failure, unspecified HF chronicity, unspecified heart failure type     I50.9      Deep vein thrombosis (DVT) of non-extremity vein, unspecified chronicity     I82.90      Syncope, unspecified syncope type     R55      Hypertension, unspecified type     I10      Dementia, unspecified dementia severity, unspecified dementia type, unspecified whether behavioral, psychotic, or mood disturbance or anxiety (Multi)     F03.90      Depression, unspecified depression type     F32.A      Weakness     R53.1      At risk for falling     Z91.81        1. Pleural effusions, follow chest x-ray.  2. Heart failure, on diuretic.  3. DVT, on Eliquis.  4. Recent syncope, continue to monitor.  5. Hypertension, med controlled.  6. Dementia, unchanged.  7. Depression, on medication.  8. Weakness, on PT/OT.  9. Fall risk, fall precautions.    Scribe Attestation  By signing my name below, Nancy DOLL Scribe attest that this documentation has been prepared under the direction and in the presence of Tomas Talavera MD.     All medical record entries made by the scribe were personally dictated by me I have reviewed the chart and agree the record accurately reflects my personal performance of his history physical examination and management      Electronically Signed By: Tomas Talavera MD   8/2/25  12:32 AM

## 2025-07-28 LAB
FUNGUS SPEC CULT: NORMAL
FUNGUS SPEC FUNGUS STN: NORMAL

## 2025-07-30 VITALS
SYSTOLIC BLOOD PRESSURE: 130 MMHG | HEART RATE: 84 BPM | DIASTOLIC BLOOD PRESSURE: 80 MMHG | TEMPERATURE: 97.9 F | RESPIRATION RATE: 18 BRPM

## 2025-07-30 VITALS
DIASTOLIC BLOOD PRESSURE: 82 MMHG | RESPIRATION RATE: 18 BRPM | HEART RATE: 80 BPM | TEMPERATURE: 97.8 F | SYSTOLIC BLOOD PRESSURE: 128 MMHG

## 2025-07-30 LAB
ACID FAST STN SPEC: NORMAL
MYCOBACTERIUM SPEC CULT: NORMAL

## 2025-07-30 ASSESSMENT — ENCOUNTER SYMPTOMS
CHILLS: 0
CHILLS: 0
FEVER: 0
FEVER: 0

## 2025-07-30 NOTE — PROGRESS NOTES
PLACE OF SERVICE:  [facility]    This is new/initial history and physical.    Subjective   Patient ID: Stella Agarwal is a 74 y.o. female who presents for Annual Exam and new/initial history.    Ms. Stella Agarwal is a 74-year-old female with history of dementia who has developed CHF with pleural effusions.  She is unable to care for herself and requires supportive care.    Review of Systems   Constitutional:  Negative for chills and fever.   Cardiovascular:  Negative for chest pain.   All other systems reviewed and are negative.    Objective   /80   Pulse 84   Temp 36.6 °C (97.9 °F)   Resp 18     Physical Exam  Vitals reviewed.   Constitutional:       General: She is not in acute distress.     Comments: This is a well-developed, well-nourished female, sitting in a chair.   HENT:      Right Ear: Tympanic membrane, ear canal and external ear normal.      Left Ear: Tympanic membrane, ear canal and external ear normal.     Eyes:      General: No scleral icterus.     Pupils: Pupils are equal, round, and reactive to light.     Neck:      Vascular: No carotid bruit.     Cardiovascular:      Heart sounds: Normal heart sounds, S1 normal and S2 normal. No murmur heard.     No friction rub.   Pulmonary:      Effort: Pulmonary effort is normal.      Breath sounds: Normal breath sounds and air entry.   Abdominal:      Palpations: There is no hepatomegaly, splenomegaly or mass.     Musculoskeletal:         General: No swelling or deformity. Normal range of motion.      Cervical back: Neck supple.      Right lower leg: Edema present.      Left lower leg: Edema present.   Lymphadenopathy:      Cervical: No cervical adenopathy.      Upper Body:      Right upper body: No axillary adenopathy.      Left upper body: No axillary adenopathy.      Lower Body: No right inguinal adenopathy. No left inguinal adenopathy.     Neurological:      Mental Status: She is alert.      Cranial Nerves: Cranial nerves  2-12 are intact. No cranial nerve deficit.      Sensory: No sensory deficit.      Motor: Motor function is intact. No weakness.      Gait: Gait is intact.      Deep Tendon Reflexes: Reflexes normal.      Comments: The patient is oriented x2.   Psychiatric:         Mood and Affect: Mood normal. Mood is not anxious or depressed. Affect is not angry.         Behavior: Behavior is not agitated.         Thought Content: Thought content normal.         Judgment: Judgment normal.     LAB WORK: Laboratory studies reviewed.    Assessment/Plan   Problem List Items Addressed This Visit    None  Visit Diagnoses         Codes      Heart failure, unspecified HF chronicity, unspecified heart failure type    -  Primary I50.9      Pleural effusion     J90      Syncope, unspecified syncope type     R55      Dementia, unspecified dementia severity, unspecified dementia type, unspecified whether behavioral, psychotic, or mood disturbance or anxiety (Multi)     F03.90      Hypertension, unspecified type     I10      Deep vein thrombosis (DVT) of non-extremity vein, unspecified chronicity     I82.90      Depression, unspecified depression type     F32.A      Weakness     R53.1      At risk for falling     Z91.81        1. Heart failure, on diuretic.  2. Pleural effusions, follow chest x-ray.  3. Recent syncope, continue to monitor.  4. Dementia, unchanged.  5. Hypertension, med controlled.  6. DVT, on Eliquis.  7. Depression, on fluoxetine.  8. Weakness, on PT/OT.  9. Fall risk, fall precautions.    Scribe Attestation  By signing my name below, INancy Scribe attest that this documentation has been prepared under the direction and in the presence of Tomas Talavera MD.     All medical record entries made by the scribe were personally dictated by me I have reviewed the chart and agree the record accurately reflects my personal performance of his history physical examination and management

## 2025-07-30 NOTE — PROGRESS NOTES
PLACE OF SERVICE:  [facility]    This is a subsequent visit.    Subjective   Patient ID: Stella Agarwal is a 74 y.o. female who presents for Follow-up.    Ms. Stella Agarwal is a 74-year-old female with history of heart failure with pleural effusions.  She is unable to care for herself and requires supportive care.    Review of Systems   Constitutional:  Negative for chills and fever.   Cardiovascular:  Negative for chest pain.   All other systems reviewed and are negative.    Objective   /82   Pulse 80   Temp 36.6 °C (97.8 °F)   Resp 18     Physical Exam  Vitals reviewed.   Constitutional:       General: She is not in acute distress.     Comments: This is a well-developed, well-nourished female, sitting in a chair.   HENT:      Right Ear: Tympanic membrane, ear canal and external ear normal.      Left Ear: Tympanic membrane, ear canal and external ear normal.     Eyes:      General: No scleral icterus.     Pupils: Pupils are equal, round, and reactive to light.     Neck:      Vascular: No carotid bruit.     Cardiovascular:      Heart sounds: Normal heart sounds, S1 normal and S2 normal. No murmur heard.     No friction rub.   Pulmonary:      Breath sounds: Decreased breath sounds (throughout) present.   Abdominal:      Palpations: There is no hepatomegaly, splenomegaly or mass.     Musculoskeletal:         General: No swelling or deformity. Normal range of motion.      Cervical back: Neck supple.      Right lower leg: Edema present.      Left lower leg: Edema present.   Lymphadenopathy:      Cervical: No cervical adenopathy.      Upper Body:      Right upper body: No axillary adenopathy.      Left upper body: No axillary adenopathy.      Lower Body: No right inguinal adenopathy. No left inguinal adenopathy.     Neurological:      Mental Status: She is alert.      Cranial Nerves: Cranial nerves 2-12 are intact. No cranial nerve deficit.      Sensory: No sensory deficit.       Motor: Motor function is intact. No weakness.      Gait: Gait is intact.      Deep Tendon Reflexes: Reflexes normal.      Comments: The patient is oriented x2.   Psychiatric:         Mood and Affect: Mood normal. Mood is not anxious or depressed. Affect is not angry.         Behavior: Behavior is not agitated.         Thought Content: Thought content normal.         Judgment: Judgment normal.     LAB WORK: Laboratory studies reviewed.    Assessment/Plan   Problem List Items Addressed This Visit    None  Visit Diagnoses         Codes      Pleural effusion    -  Primary J90      Heart failure, unspecified HF chronicity, unspecified heart failure type     I50.9      Deep vein thrombosis (DVT) of non-extremity vein, unspecified chronicity     I82.90      Syncope, unspecified syncope type     R55      Hypertension, unspecified type     I10      Dementia, unspecified dementia severity, unspecified dementia type, unspecified whether behavioral, psychotic, or mood disturbance or anxiety (Multi)     F03.90      Depression, unspecified depression type     F32.A      Weakness     R53.1      At risk for falling     Z91.81        1. Pleural effusions, follow chest x-ray.  2. Heart failure, on diuretic.  3. DVT, on Eliquis.  4. Recent syncope, continue to monitor.  5. Hypertension, med controlled.  6. Dementia, unchanged.  7. Depression, on medication.  8. Weakness, on PT/OT.  9. Fall risk, fall precautions.    Scribe Attestation  By signing my name below, INancy Scribe attest that this documentation has been prepared under the direction and in the presence of Tomas Talavera MD.     All medical record entries made by the scribe were personally dictated by me I have reviewed the chart and agree the record accurately reflects my personal performance of his history physical examination and management

## 2025-08-02 ENCOUNTER — DOCUMENTATION (OUTPATIENT)
Dept: HOME HEALTH SERVICES | Facility: HOME HEALTH | Age: 75
End: 2025-08-02
Payer: COMMERCIAL

## 2025-08-02 ENCOUNTER — NURSING HOME VISIT (OUTPATIENT)
Dept: POST ACUTE CARE | Facility: EXTERNAL LOCATION | Age: 75
End: 2025-08-02
Payer: COMMERCIAL

## 2025-08-02 ENCOUNTER — HOME HEALTH ADMISSION (OUTPATIENT)
Dept: HOME HEALTH SERVICES | Facility: HOME HEALTH | Age: 75
End: 2025-08-02
Payer: COMMERCIAL

## 2025-08-02 DIAGNOSIS — F03.90 DEMENTIA, UNSPECIFIED DEMENTIA SEVERITY, UNSPECIFIED DEMENTIA TYPE, UNSPECIFIED WHETHER BEHAVIORAL, PSYCHOTIC, OR MOOD DISTURBANCE OR ANXIETY (MULTI): ICD-10-CM

## 2025-08-02 DIAGNOSIS — F32.A DEPRESSION, UNSPECIFIED DEPRESSION TYPE: ICD-10-CM

## 2025-08-02 DIAGNOSIS — I50.9 HEART FAILURE, UNSPECIFIED HF CHRONICITY, UNSPECIFIED HEART FAILURE TYPE: Primary | ICD-10-CM

## 2025-08-02 DIAGNOSIS — I10 HYPERTENSION, UNSPECIFIED TYPE: ICD-10-CM

## 2025-08-02 DIAGNOSIS — R55 SYNCOPE, UNSPECIFIED SYNCOPE TYPE: ICD-10-CM

## 2025-08-02 DIAGNOSIS — J90 PLEURAL EFFUSION: ICD-10-CM

## 2025-08-02 DIAGNOSIS — R53.1 WEAKNESS: ICD-10-CM

## 2025-08-02 DIAGNOSIS — I82.90 DEEP VEIN THROMBOSIS (DVT) OF NON-EXTREMITY VEIN, UNSPECIFIED CHRONICITY: ICD-10-CM

## 2025-08-02 DIAGNOSIS — Z91.81 AT RISK FOR FALLING: ICD-10-CM

## 2025-08-02 PROBLEM — E66.01 OBESITY, MORBID (MULTI): Status: ACTIVE | Noted: 2025-08-02

## 2025-08-02 PROCEDURE — 99308 SBSQ NF CARE LOW MDM 20: CPT | Performed by: INTERNAL MEDICINE

## 2025-08-02 NOTE — HH CARE COORDINATION
Home Care received a Referral for Physical Therapy and Occupational Therapy. We have processed the referral for a Start of Care within 48 hours of DISCHARGE.     If you have any questions or concerns, please feel free to contact us at [phone]. Follow the prompts, enter your five digit zip code, and you will be directed to your care team on [facility].

## 2025-08-03 LAB
ATRIAL RATE: 44 BPM
P AXIS: 98 DEGREES
P OFFSET: 140 MS
P ONSET: 118 MS
PR INTERVAL: 190 MS
Q ONSET: 213 MS
QRS COUNT: 7 BEATS
QRS DURATION: 76 MS
QT INTERVAL: 520 MS
QTC CALCULATION(BAZETT): 444 MS
QTC FREDERICIA: 468 MS
R AXIS: -79 DEGREES
T AXIS: 19 DEGREES
T OFFSET: 473 MS
VENTRICULAR RATE: 44 BPM

## 2025-08-05 ENCOUNTER — HOME CARE VISIT (OUTPATIENT)
Dept: HOME HEALTH SERVICES | Facility: HOME HEALTH | Age: 75
End: 2025-08-05
Payer: COMMERCIAL

## 2025-08-05 VITALS
HEART RATE: 86 BPM | OXYGEN SATURATION: 98 % | DIASTOLIC BLOOD PRESSURE: 70 MMHG | TEMPERATURE: 98.2 F | SYSTOLIC BLOOD PRESSURE: 130 MMHG

## 2025-08-05 PROCEDURE — G0151 HHCP-SERV OF PT,EA 15 MIN: HCPCS | Mod: HHH

## 2025-08-05 ASSESSMENT — ENCOUNTER SYMPTOMS
PERSON REPORTING PAIN: PATIENT
SLEEP QUALITY: ADEQUATE
AGGRESSION WITHIN DEFINED LIMITS: 1
OCCASIONAL FEELINGS OF UNSTEADINESS: 1
DENIES PAIN: 1
ANGER WITHIN DEFINED LIMITS: 1

## 2025-08-05 ASSESSMENT — ACTIVITIES OF DAILY LIVING (ADL)
AMBULATION ASSISTANCE: 1
CURRENT_FUNCTION: MINIMUM ASSIST
CURRENT_FUNCTION: CONTACT GUARD ASSIST
AMBULATION ASSISTANCE ON FLAT SURFACES: 1
PHYSICAL TRANSFERS ASSESSED: 1
AMBULATION ASSISTANCE: CONTACT GUARD ASSIST
ENTERING_EXITING_HOME: MINIMUM ASSIST
OASIS_M1830: 05

## 2025-08-06 LAB
ACID FAST STN SPEC: NORMAL
MYCOBACTERIUM SPEC CULT: NORMAL

## 2025-08-07 ENCOUNTER — APPOINTMENT (OUTPATIENT)
Dept: CARDIOLOGY | Facility: HOSPITAL | Age: 75
End: 2025-08-07
Payer: COMMERCIAL

## 2025-08-08 ENCOUNTER — HOME CARE VISIT (OUTPATIENT)
Dept: HOME HEALTH SERVICES | Facility: HOME HEALTH | Age: 75
End: 2025-08-08
Payer: COMMERCIAL

## 2025-08-08 PROCEDURE — G0152 HHCP-SERV OF OT,EA 15 MIN: HCPCS | Mod: HHH

## 2025-08-08 SDOH — ECONOMIC STABILITY: HOUSING INSECURITY: ASSISTED LIVING: 1

## 2025-08-08 ASSESSMENT — ACTIVITIES OF DAILY LIVING (ADL)
GROOMING_CURRENT_FUNCTION: INDEPENDENT
LAUNDRY: DEPENDENT
GROOMING ASSESSED: 1
AMBULATION ASSISTANCE: 1
TOILETING: MODERATE ASSIST
TOILETING: 1
CURRENT_FUNCTION: INDEPENDENT
BATHING_CURRENT_FUNCTION: MODERATE ASSIST
AMBULATION ASSISTANCE: STAND BY ASSIST
PREPARING MEALS: DEPENDENT
PHYSICAL TRANSFERS ASSESSED: 1
DRESSING_LB_CURRENT_FUNCTION: MAXIMUM ASSIST
FEEDING ASSESSED: 1
BATHING ASSESSED: 1
FEEDING: INDEPENDENT
DRESSING_UB_CURRENT_FUNCTION: MINIMUM ASSIST
LAUNDRY ASSESSED: 1

## 2025-08-08 ASSESSMENT — ENCOUNTER SYMPTOMS
PERSON REPORTING PAIN: PATIENT
DENIES PAIN: 1

## 2025-08-12 ENCOUNTER — HOME CARE VISIT (OUTPATIENT)
Dept: HOME HEALTH SERVICES | Facility: HOME HEALTH | Age: 75
End: 2025-08-12
Payer: COMMERCIAL

## 2025-08-12 VITALS
SYSTOLIC BLOOD PRESSURE: 140 MMHG | HEART RATE: 66 BPM | DIASTOLIC BLOOD PRESSURE: 70 MMHG | TEMPERATURE: 97.6 F | OXYGEN SATURATION: 97 %

## 2025-08-12 PROCEDURE — G0151 HHCP-SERV OF PT,EA 15 MIN: HCPCS | Mod: HHH

## 2025-08-12 SDOH — HEALTH STABILITY: PHYSICAL HEALTH
EXERCISE COMMENTS: PATIENT INSTRUCTED IN AND COMPLETES 10 REPS STD HEEL/ TOE RAISES, MINISQUATS B HANDS ON SINK, CGA, 5 REPS KNEE FLEXION EACH LE THEN SOB AND NEEDS SEATED REST.  IN SITTING COMPLETES 10 REPS X 2 SETS MARCHING, LAQ, AP.

## 2025-08-12 SDOH — HEALTH STABILITY: PHYSICAL HEALTH: EXERCISE TYPE: SEATED AND STD

## 2025-08-12 ASSESSMENT — ACTIVITIES OF DAILY LIVING (ADL): AMBULATION ASSISTANCE ON FLAT SURFACES: 1

## 2025-08-12 ASSESSMENT — ENCOUNTER SYMPTOMS
PERSON REPORTING PAIN: PATIENT
DENIES PAIN: 1

## 2025-08-13 LAB
ACID FAST STN SPEC: NORMAL
MYCOBACTERIUM SPEC CULT: NORMAL

## 2025-08-14 ENCOUNTER — HOME CARE VISIT (OUTPATIENT)
Dept: HOME HEALTH SERVICES | Facility: HOME HEALTH | Age: 75
End: 2025-08-14
Payer: COMMERCIAL

## 2025-08-14 VITALS
OXYGEN SATURATION: 97 % | HEART RATE: 72 BPM | SYSTOLIC BLOOD PRESSURE: 118 MMHG | RESPIRATION RATE: 18 BRPM | DIASTOLIC BLOOD PRESSURE: 70 MMHG

## 2025-08-14 VITALS — SYSTOLIC BLOOD PRESSURE: 118 MMHG | OXYGEN SATURATION: 97 % | HEART RATE: 70 BPM | DIASTOLIC BLOOD PRESSURE: 60 MMHG

## 2025-08-14 PROCEDURE — G0157 HHC PT ASSISTANT EA 15: HCPCS | Mod: CQ,HHH

## 2025-08-14 PROCEDURE — G0152 HHCP-SERV OF OT,EA 15 MIN: HCPCS | Mod: HHH

## 2025-08-14 SDOH — HEALTH STABILITY: PHYSICAL HEALTH: EXERCISE TYPE: BLE STRENGTHENING

## 2025-08-14 ASSESSMENT — ENCOUNTER SYMPTOMS
PERSON REPORTING PAIN: PATIENT
DENIES PAIN: 1
PERSON REPORTING PAIN: PATIENT
DENIES PAIN: 1

## 2025-08-14 ASSESSMENT — ACTIVITIES OF DAILY LIVING (ADL)
AMBULATION_DISTANCE/DURATION_TOLERATED: 50 FEET
AMBULATION ASSISTANCE ON FLAT SURFACES: 1

## 2025-08-18 ENCOUNTER — HOSPITAL ENCOUNTER (OUTPATIENT)
Dept: CARDIOLOGY | Facility: CLINIC | Age: 75
Discharge: HOME | End: 2025-08-18
Payer: COMMERCIAL

## 2025-08-18 DIAGNOSIS — I49.5 SICK SINUS SYNDROME (MULTI): ICD-10-CM

## 2025-08-18 DIAGNOSIS — Z95.0 PRESENCE OF CARDIAC PACEMAKER: ICD-10-CM

## 2025-08-18 PROCEDURE — 93296 REM INTERROG EVL PM/IDS: CPT

## 2025-08-19 ENCOUNTER — HOME CARE VISIT (OUTPATIENT)
Dept: HOME HEALTH SERVICES | Facility: HOME HEALTH | Age: 75
End: 2025-08-19
Payer: COMMERCIAL

## 2025-08-19 VITALS
DIASTOLIC BLOOD PRESSURE: 80 MMHG | SYSTOLIC BLOOD PRESSURE: 130 MMHG | OXYGEN SATURATION: 94 % | HEART RATE: 93 BPM | TEMPERATURE: 98.4 F

## 2025-08-19 PROCEDURE — G0152 HHCP-SERV OF OT,EA 15 MIN: HCPCS | Mod: HHH

## 2025-08-19 PROCEDURE — G0151 HHCP-SERV OF PT,EA 15 MIN: HCPCS | Mod: HHH

## 2025-08-19 SDOH — HEALTH STABILITY: PHYSICAL HEALTH: EXERCISE TYPE: SEATED AND STD

## 2025-08-19 ASSESSMENT — ACTIVITIES OF DAILY LIVING (ADL)
AMBULATION ASSISTANCE ON FLAT SURFACES: 1
AMBULATION_DISTANCE/DURATION_TOLERATED: 100 FEET

## 2025-08-19 ASSESSMENT — ENCOUNTER SYMPTOMS
PERSON REPORTING PAIN: PATIENT
PERSON REPORTING PAIN: PATIENT
DENIES PAIN: 1
DENIES PAIN: 1

## 2025-08-20 LAB
ACID FAST STN SPEC: NORMAL
MYCOBACTERIUM SPEC CULT: NORMAL

## 2025-08-21 ENCOUNTER — HOME CARE VISIT (OUTPATIENT)
Dept: HOME HEALTH SERVICES | Facility: HOME HEALTH | Age: 75
End: 2025-08-21
Payer: COMMERCIAL

## 2025-08-21 VITALS — HEART RATE: 84 BPM | OXYGEN SATURATION: 97 %

## 2025-08-21 PROCEDURE — G0151 HHCP-SERV OF PT,EA 15 MIN: HCPCS | Mod: HHH

## 2025-08-21 ASSESSMENT — ENCOUNTER SYMPTOMS
DENIES PAIN: 1
PERSON REPORTING PAIN: PATIENT

## 2025-08-22 ENCOUNTER — HOME CARE VISIT (OUTPATIENT)
Dept: HOME HEALTH SERVICES | Facility: HOME HEALTH | Age: 75
End: 2025-08-22
Payer: COMMERCIAL

## 2025-08-22 VITALS
SYSTOLIC BLOOD PRESSURE: 110 MMHG | OXYGEN SATURATION: 96 % | RESPIRATION RATE: 18 BRPM | DIASTOLIC BLOOD PRESSURE: 70 MMHG | HEART RATE: 62 BPM

## 2025-08-22 PROCEDURE — G0152 HHCP-SERV OF OT,EA 15 MIN: HCPCS | Mod: HHH

## 2025-08-22 ASSESSMENT — ENCOUNTER SYMPTOMS
DENIES PAIN: 1
PERSON REPORTING PAIN: PATIENT

## 2025-08-23 VITALS
DIASTOLIC BLOOD PRESSURE: 82 MMHG | TEMPERATURE: 97.8 F | RESPIRATION RATE: 16 BRPM | HEART RATE: 78 BPM | SYSTOLIC BLOOD PRESSURE: 126 MMHG

## 2025-08-23 ASSESSMENT — ENCOUNTER SYMPTOMS
FEVER: 0
CHILLS: 0

## 2025-08-26 ENCOUNTER — HOME CARE VISIT (OUTPATIENT)
Dept: HOME HEALTH SERVICES | Facility: HOME HEALTH | Age: 75
End: 2025-08-26
Payer: COMMERCIAL

## 2025-08-26 VITALS
OXYGEN SATURATION: 96 % | DIASTOLIC BLOOD PRESSURE: 70 MMHG | SYSTOLIC BLOOD PRESSURE: 150 MMHG | HEART RATE: 76 BPM | TEMPERATURE: 97.7 F

## 2025-08-26 PROCEDURE — G0151 HHCP-SERV OF PT,EA 15 MIN: HCPCS | Mod: HHH

## 2025-08-26 ASSESSMENT — ENCOUNTER SYMPTOMS
PERSON REPORTING PAIN: PATIENT
DENIES PAIN: 1

## 2025-08-27 LAB
ACID FAST STN SPEC: NORMAL
MYCOBACTERIUM SPEC CULT: NORMAL

## 2025-08-28 ENCOUNTER — HOME CARE VISIT (OUTPATIENT)
Dept: HOME HEALTH SERVICES | Facility: HOME HEALTH | Age: 75
End: 2025-08-28
Payer: COMMERCIAL

## 2025-08-28 VITALS
DIASTOLIC BLOOD PRESSURE: 70 MMHG | SYSTOLIC BLOOD PRESSURE: 130 MMHG | OXYGEN SATURATION: 92 % | HEART RATE: 88 BPM | TEMPERATURE: 97.8 F | RESPIRATION RATE: 18 BRPM

## 2025-08-28 PROCEDURE — G0151 HHCP-SERV OF PT,EA 15 MIN: HCPCS | Mod: HHH

## 2025-08-28 SDOH — HEALTH STABILITY: PHYSICAL HEALTH
EXERCISE COMMENTS: PATIENT INSTRUCTED IN AND COMPLETES 15 REPS SEATED AP, LAQ, MARCHING TOL WELL  PATIENT INSTRUCTED IN AND COMPLETES 15 REPS STD HEEL/TOE RAISES, MARCHING, HIP ABD, EXT, KNEE FLEXION, MINISUQATS S AND VP FOR FORM

## 2025-08-28 SDOH — HEALTH STABILITY: PHYSICAL HEALTH: EXERCISE TYPE: SEATED AND STD

## 2025-08-28 ASSESSMENT — ACTIVITIES OF DAILY LIVING (ADL)
AMBULATION_DISTANCE/DURATION_TOLERATED: 300 FEET
AMBULATION ASSISTANCE ON FLAT SURFACES: 1

## 2025-08-28 ASSESSMENT — ENCOUNTER SYMPTOMS
OCCASIONAL FEELINGS OF UNSTEADINESS: 0
PERSON REPORTING PAIN: PATIENT
DENIES PAIN: 1

## 2025-08-30 ASSESSMENT — ACTIVITIES OF DAILY LIVING (ADL)
CURRENT_FUNCTION: INDEPENDENT
OASIS_M1830: 02
AMBULATION ASSISTANCE: 1
AMBULATION ASSISTANCE: SUPERVISION
HOME_HEALTH_OASIS: 01
PHYSICAL TRANSFERS ASSESSED: 1

## 2025-09-03 LAB
ACID FAST STN SPEC: NORMAL
MYCOBACTERIUM SPEC CULT: NORMAL

## (undated) DEVICE — ENVELOPE, ANTIBACTERIAL, AIGIS RX TYRX, ABSORBABLE, LRG

## (undated) DEVICE — INTRODUCER SYSTEM, PRELUDE SNAP, SPLITTABLE, HEMOSTATIC, 7FR

## (undated) DEVICE — ACCESS KIT, MINI MAK, 5FR X 10CM, 0.018 X 40CM, SS/SS, STANDARD NEEDLE